# Patient Record
Sex: FEMALE | Race: BLACK OR AFRICAN AMERICAN | NOT HISPANIC OR LATINO | URBAN - METROPOLITAN AREA
[De-identification: names, ages, dates, MRNs, and addresses within clinical notes are randomized per-mention and may not be internally consistent; named-entity substitution may affect disease eponyms.]

---

## 2017-03-25 ENCOUNTER — OUTPATIENT (OUTPATIENT)
Dept: OUTPATIENT SERVICES | Facility: HOSPITAL | Age: 27
LOS: 1 days | End: 2017-03-25
Payer: MEDICAID

## 2017-03-25 DIAGNOSIS — Z3A.00 WEEKS OF GESTATION OF PREGNANCY NOT SPECIFIED: ICD-10-CM

## 2017-03-25 DIAGNOSIS — O26.899 OTHER SPECIFIED PREGNANCY RELATED CONDITIONS, UNSPECIFIED TRIMESTER: ICD-10-CM

## 2017-03-25 LAB
APPEARANCE UR: CLEAR — SIGNIFICANT CHANGE UP
BILIRUB UR-MCNC: NEGATIVE — SIGNIFICANT CHANGE UP
BLOOD UR QL VISUAL: NEGATIVE — SIGNIFICANT CHANGE UP
COLOR SPEC: YELLOW — SIGNIFICANT CHANGE UP
GLUCOSE UR-MCNC: NEGATIVE — SIGNIFICANT CHANGE UP
KETONES UR-MCNC: NEGATIVE — SIGNIFICANT CHANGE UP
LEUKOCYTE ESTERASE UR-ACNC: NEGATIVE — SIGNIFICANT CHANGE UP
MUCOUS THREADS # UR AUTO: SIGNIFICANT CHANGE UP
NITRITE UR-MCNC: NEGATIVE — SIGNIFICANT CHANGE UP
PH UR: 7 — SIGNIFICANT CHANGE UP (ref 4.6–8)
PROT UR-MCNC: 30 — HIGH
RBC CASTS # UR COMP ASSIST: SIGNIFICANT CHANGE UP (ref 0–?)
SP GR SPEC: 1.03 — SIGNIFICANT CHANGE UP (ref 1–1.03)
SQUAMOUS # UR AUTO: SIGNIFICANT CHANGE UP
UROBILINOGEN FLD QL: 1 E.U. — SIGNIFICANT CHANGE UP (ref 0.1–0.2)
WBC UR QL: SIGNIFICANT CHANGE UP (ref 0–?)

## 2017-03-25 PROCEDURE — 76818 FETAL BIOPHYS PROFILE W/NST: CPT | Mod: 26

## 2017-03-25 PROCEDURE — 99203 OFFICE O/P NEW LOW 30 MIN: CPT | Mod: 25

## 2017-03-27 LAB
BACTERIA UR CULT: SIGNIFICANT CHANGE UP
SPECIMEN SOURCE: SIGNIFICANT CHANGE UP

## 2017-04-10 ENCOUNTER — INPATIENT (INPATIENT)
Facility: HOSPITAL | Age: 27
LOS: 4 days | Discharge: HOME | End: 2017-04-15
Attending: OBSTETRICS & GYNECOLOGY | Admitting: OBSTETRICS & GYNECOLOGY
Payer: MEDICAID

## 2017-04-10 VITALS
DIASTOLIC BLOOD PRESSURE: 71 MMHG | HEIGHT: 66 IN | OXYGEN SATURATION: 99 % | HEART RATE: 92 BPM | RESPIRATION RATE: 18 BRPM | SYSTOLIC BLOOD PRESSURE: 130 MMHG | TEMPERATURE: 100 F | WEIGHT: 293 LBS

## 2017-04-10 DIAGNOSIS — O26.899 OTHER SPECIFIED PREGNANCY RELATED CONDITIONS, UNSPECIFIED TRIMESTER: ICD-10-CM

## 2017-04-10 DIAGNOSIS — Z3A.00 WEEKS OF GESTATION OF PREGNANCY NOT SPECIFIED: ICD-10-CM

## 2017-04-10 LAB
ALBUMIN SERPL ELPH-MCNC: 3.4 G/DL — SIGNIFICANT CHANGE UP (ref 3.3–5)
ALP SERPL-CCNC: 179 U/L — HIGH (ref 40–120)
ALT FLD-CCNC: 41 U/L — HIGH (ref 4–33)
APPEARANCE UR: CLEAR — SIGNIFICANT CHANGE UP
APTT BLD: 28.7 SEC — SIGNIFICANT CHANGE UP (ref 27.5–37.4)
AST SERPL-CCNC: 28 U/L — SIGNIFICANT CHANGE UP (ref 4–32)
BASOPHILS # BLD AUTO: 0.01 K/UL — SIGNIFICANT CHANGE UP (ref 0–0.2)
BASOPHILS NFR BLD AUTO: 0.1 % — SIGNIFICANT CHANGE UP (ref 0–2)
BILIRUB SERPL-MCNC: 0.4 MG/DL — SIGNIFICANT CHANGE UP (ref 0.2–1.2)
BILIRUB UR-MCNC: NEGATIVE — SIGNIFICANT CHANGE UP
BLD GP AB SCN SERPL QL: NEGATIVE — SIGNIFICANT CHANGE UP
BLOOD UR QL VISUAL: HIGH
BUN SERPL-MCNC: 8 MG/DL — SIGNIFICANT CHANGE UP (ref 7–23)
CALCIUM SERPL-MCNC: 9.1 MG/DL — SIGNIFICANT CHANGE UP (ref 8.4–10.5)
CHLORIDE SERPL-SCNC: 103 MMOL/L — SIGNIFICANT CHANGE UP (ref 98–107)
CO2 SERPL-SCNC: 20 MMOL/L — LOW (ref 22–31)
COLOR SPEC: YELLOW — SIGNIFICANT CHANGE UP
CREAT ?TM UR-MCNC: 162.01 MG/DL — SIGNIFICANT CHANGE UP
CREAT SERPL-MCNC: 0.43 MG/DL — LOW (ref 0.5–1.3)
EOSINOPHIL # BLD AUTO: 0.03 K/UL — SIGNIFICANT CHANGE UP (ref 0–0.5)
EOSINOPHIL NFR BLD AUTO: 0.3 % — SIGNIFICANT CHANGE UP (ref 0–6)
FIBRINOGEN PPP-MCNC: 835 MG/DL — HIGH (ref 310–510)
GLUCOSE SERPL-MCNC: 72 MG/DL — SIGNIFICANT CHANGE UP (ref 70–99)
GLUCOSE UR-MCNC: NEGATIVE — SIGNIFICANT CHANGE UP
HCT VFR BLD CALC: 33.4 % — LOW (ref 34.5–45)
HGB BLD-MCNC: 10.6 G/DL — LOW (ref 11.5–15.5)
IMM GRANULOCYTES NFR BLD AUTO: 0.1 % — SIGNIFICANT CHANGE UP (ref 0–1.5)
INR BLD: 0.98 — SIGNIFICANT CHANGE UP (ref 0.88–1.17)
KETONES UR-MCNC: SIGNIFICANT CHANGE UP
LEUKOCYTE ESTERASE UR-ACNC: SIGNIFICANT CHANGE UP
LYMPHOCYTES # BLD AUTO: 2.3 K/UL — SIGNIFICANT CHANGE UP (ref 1–3.3)
LYMPHOCYTES # BLD AUTO: 23.8 % — SIGNIFICANT CHANGE UP (ref 13–44)
MCHC RBC-ENTMCNC: 24.8 PG — LOW (ref 27–34)
MCHC RBC-ENTMCNC: 31.7 % — LOW (ref 32–36)
MCV RBC AUTO: 78 FL — LOW (ref 80–100)
MONOCYTES # BLD AUTO: 0.6 K/UL — SIGNIFICANT CHANGE UP (ref 0–0.9)
MONOCYTES NFR BLD AUTO: 6.2 % — SIGNIFICANT CHANGE UP (ref 2–14)
MUCOUS THREADS # UR AUTO: SIGNIFICANT CHANGE UP
NEUTROPHILS # BLD AUTO: 6.71 K/UL — SIGNIFICANT CHANGE UP (ref 1.8–7.4)
NEUTROPHILS NFR BLD AUTO: 69.5 % — SIGNIFICANT CHANGE UP (ref 43–77)
NITRITE UR-MCNC: NEGATIVE — SIGNIFICANT CHANGE UP
NON-SQ EPI CELLS # UR AUTO: <1 — SIGNIFICANT CHANGE UP
PH UR: 6.5 — SIGNIFICANT CHANGE UP (ref 4.6–8)
PLATELET # BLD AUTO: 254 K/UL — SIGNIFICANT CHANGE UP (ref 150–400)
PMV BLD: 11.2 FL — SIGNIFICANT CHANGE UP (ref 7–13)
POTASSIUM SERPL-MCNC: 3.8 MMOL/L — SIGNIFICANT CHANGE UP (ref 3.5–5.3)
POTASSIUM SERPL-SCNC: 3.8 MMOL/L — SIGNIFICANT CHANGE UP (ref 3.5–5.3)
PROT SERPL-MCNC: 7.8 G/DL — SIGNIFICANT CHANGE UP (ref 6–8.3)
PROT UR-MCNC: 30 — HIGH
PROT UR-MCNC: 46.2 MG/DL — SIGNIFICANT CHANGE UP
PROTHROM AB SERPL-ACNC: 11 SEC — SIGNIFICANT CHANGE UP (ref 9.8–13.1)
RBC # BLD: 4.28 M/UL — SIGNIFICANT CHANGE UP (ref 3.8–5.2)
RBC # FLD: 15 % — HIGH (ref 10.3–14.5)
RBC CASTS # UR COMP ASSIST: HIGH (ref 0–?)
RH IG SCN BLD-IMP: POSITIVE — SIGNIFICANT CHANGE UP
RH IG SCN BLD-IMP: POSITIVE — SIGNIFICANT CHANGE UP
SODIUM SERPL-SCNC: 139 MMOL/L — SIGNIFICANT CHANGE UP (ref 135–145)
SP GR SPEC: 1.03 — SIGNIFICANT CHANGE UP (ref 1–1.03)
SQUAMOUS # UR AUTO: SIGNIFICANT CHANGE UP
UROBILINOGEN FLD QL: 1 E.U. — SIGNIFICANT CHANGE UP (ref 0.1–0.2)
WBC # BLD: 9.66 K/UL — SIGNIFICANT CHANGE UP (ref 3.8–10.5)
WBC # FLD AUTO: 9.66 K/UL — SIGNIFICANT CHANGE UP (ref 3.8–10.5)
WBC UR QL: SIGNIFICANT CHANGE UP (ref 0–?)

## 2017-04-10 RX ORDER — CITRIC ACID/SODIUM CITRATE 300-500 MG
15 SOLUTION, ORAL ORAL EVERY 4 HOURS
Qty: 0 | Refills: 0 | Status: DISCONTINUED | OUTPATIENT
Start: 2017-04-10 | End: 2017-04-11

## 2017-04-10 RX ORDER — SODIUM CHLORIDE 9 MG/ML
1000 INJECTION, SOLUTION INTRAVENOUS
Qty: 0 | Refills: 0 | Status: DISCONTINUED | OUTPATIENT
Start: 2017-04-10 | End: 2017-04-10

## 2017-04-10 RX ORDER — PENICILLIN G POTASSIUM 5000000 [IU]/1
POWDER, FOR SOLUTION INTRAMUSCULAR; INTRAPLEURAL; INTRATHECAL; INTRAVENOUS
Qty: 0 | Refills: 0 | Status: DISCONTINUED | OUTPATIENT
Start: 2017-04-10 | End: 2017-04-10

## 2017-04-10 RX ORDER — SODIUM CHLORIDE 9 MG/ML
1000 INJECTION, SOLUTION INTRAVENOUS
Qty: 0 | Refills: 0 | Status: DISCONTINUED | OUTPATIENT
Start: 2017-04-10 | End: 2017-04-11

## 2017-04-10 RX ORDER — PENICILLIN G POTASSIUM 5000000 [IU]/1
2.5 POWDER, FOR SOLUTION INTRAMUSCULAR; INTRAPLEURAL; INTRATHECAL; INTRAVENOUS EVERY 4 HOURS
Qty: 0 | Refills: 0 | Status: DISCONTINUED | OUTPATIENT
Start: 2017-04-10 | End: 2017-04-10

## 2017-04-10 RX ORDER — SODIUM CHLORIDE 9 MG/ML
1000 INJECTION, SOLUTION INTRAVENOUS ONCE
Qty: 0 | Refills: 0 | Status: DISCONTINUED | OUTPATIENT
Start: 2017-04-10 | End: 2017-04-10

## 2017-04-10 RX ORDER — OXYTOCIN 10 UNIT/ML
333.33 VIAL (ML) INJECTION
Qty: 20 | Refills: 0 | Status: DISCONTINUED | OUTPATIENT
Start: 2017-04-10 | End: 2017-04-10

## 2017-04-10 RX ORDER — BUTORPHANOL TARTRATE 2 MG/ML
2 INJECTION, SOLUTION INTRAMUSCULAR; INTRAVENOUS ONCE
Qty: 0 | Refills: 0 | Status: DISCONTINUED | OUTPATIENT
Start: 2017-04-10 | End: 2017-04-10

## 2017-04-10 RX ORDER — PENICILLIN G POTASSIUM 5000000 [IU]/1
5 POWDER, FOR SOLUTION INTRAMUSCULAR; INTRAPLEURAL; INTRATHECAL; INTRAVENOUS ONCE
Qty: 0 | Refills: 0 | Status: COMPLETED | OUTPATIENT
Start: 2017-04-10 | End: 2017-04-10

## 2017-04-10 RX ORDER — OXYTOCIN 10 UNIT/ML
333.33 VIAL (ML) INJECTION
Qty: 20 | Refills: 0 | Status: DISCONTINUED | OUTPATIENT
Start: 2017-04-10 | End: 2017-04-11

## 2017-04-10 RX ORDER — SODIUM CHLORIDE 9 MG/ML
1000 INJECTION, SOLUTION INTRAVENOUS ONCE
Qty: 0 | Refills: 0 | Status: DISCONTINUED | OUTPATIENT
Start: 2017-04-10 | End: 2017-04-11

## 2017-04-10 RX ORDER — CITRIC ACID/SODIUM CITRATE 300-500 MG
15 SOLUTION, ORAL ORAL EVERY 4 HOURS
Qty: 0 | Refills: 0 | Status: DISCONTINUED | OUTPATIENT
Start: 2017-04-10 | End: 2017-04-10

## 2017-04-10 RX ADMIN — PENICILLIN G POTASSIUM 200 MILLION UNIT(S): 5000000 POWDER, FOR SOLUTION INTRAMUSCULAR; INTRAPLEURAL; INTRATHECAL; INTRAVENOUS at 23:00

## 2017-04-10 RX ADMIN — BUTORPHANOL TARTRATE 2 MILLIGRAM(S): 2 INJECTION, SOLUTION INTRAMUSCULAR; INTRAVENOUS at 22:42

## 2017-04-10 RX ADMIN — BUTORPHANOL TARTRATE 2 MILLIGRAM(S): 2 INJECTION, SOLUTION INTRAMUSCULAR; INTRAVENOUS at 23:01

## 2017-04-10 RX ADMIN — SODIUM CHLORIDE 125 MILLILITER(S): 9 INJECTION, SOLUTION INTRAVENOUS at 18:59

## 2017-04-10 RX ADMIN — Medication 204 MILLIGRAM(S): at 22:42

## 2017-04-10 RX ADMIN — PENICILLIN G POTASSIUM 200 MILLION UNIT(S): 5000000 POWDER, FOR SOLUTION INTRAMUSCULAR; INTRAPLEURAL; INTRATHECAL; INTRAVENOUS at 19:00

## 2017-04-11 ENCOUNTER — TRANSCRIPTION ENCOUNTER (OUTPATIENT)
Age: 27
End: 2017-04-11

## 2017-04-11 LAB
ALBUMIN SERPL ELPH-MCNC: 3.2 G/DL — LOW (ref 3.3–5)
ALP SERPL-CCNC: 175 U/L — HIGH (ref 40–120)
ALT FLD-CCNC: 38 U/L — HIGH (ref 4–33)
APTT BLD: 27.8 SEC — SIGNIFICANT CHANGE UP (ref 27.5–37.4)
AST SERPL-CCNC: 26 U/L — SIGNIFICANT CHANGE UP (ref 4–32)
BASOPHILS # BLD AUTO: 0.01 K/UL — SIGNIFICANT CHANGE UP (ref 0–0.2)
BASOPHILS NFR BLD AUTO: 0.1 % — SIGNIFICANT CHANGE UP (ref 0–2)
BILIRUB SERPL-MCNC: 0.4 MG/DL — SIGNIFICANT CHANGE UP (ref 0.2–1.2)
BUN SERPL-MCNC: 7 MG/DL — SIGNIFICANT CHANGE UP (ref 7–23)
CALCIUM SERPL-MCNC: 9.1 MG/DL — SIGNIFICANT CHANGE UP (ref 8.4–10.5)
CHLORIDE SERPL-SCNC: 100 MMOL/L — SIGNIFICANT CHANGE UP (ref 98–107)
CO2 SERPL-SCNC: 20 MMOL/L — LOW (ref 22–31)
CREAT SERPL-MCNC: 0.45 MG/DL — LOW (ref 0.5–1.3)
EOSINOPHIL # BLD AUTO: 0.03 K/UL — SIGNIFICANT CHANGE UP (ref 0–0.5)
EOSINOPHIL NFR BLD AUTO: 0.3 % — SIGNIFICANT CHANGE UP (ref 0–6)
FIBRINOGEN PPP-MCNC: 828.7 MG/DL — HIGH (ref 310–510)
GLUCOSE SERPL-MCNC: 123 MG/DL — HIGH (ref 70–99)
HCT VFR BLD CALC: 27.7 % — LOW (ref 34.5–45)
HCT VFR BLD CALC: 32.9 % — LOW (ref 34.5–45)
HCV AB S/CO SERPL IA: 0.19 S/CO — SIGNIFICANT CHANGE UP
HCV AB SERPL-IMP: SIGNIFICANT CHANGE UP
HGB BLD-MCNC: 10.4 G/DL — LOW (ref 11.5–15.5)
HGB BLD-MCNC: 8.8 G/DL — LOW (ref 11.5–15.5)
IMM GRANULOCYTES NFR BLD AUTO: 0.3 % — SIGNIFICANT CHANGE UP (ref 0–1.5)
INR BLD: 1.01 — SIGNIFICANT CHANGE UP (ref 0.88–1.17)
LDH SERPL L TO P-CCNC: 146 U/L — SIGNIFICANT CHANGE UP (ref 135–225)
LYMPHOCYTES # BLD AUTO: 2.03 K/UL — SIGNIFICANT CHANGE UP (ref 1–3.3)
LYMPHOCYTES # BLD AUTO: 22.4 % — SIGNIFICANT CHANGE UP (ref 13–44)
MCHC RBC-ENTMCNC: 24.6 PG — LOW (ref 27–34)
MCHC RBC-ENTMCNC: 25 PG — LOW (ref 27–34)
MCHC RBC-ENTMCNC: 31.6 % — LOW (ref 32–36)
MCHC RBC-ENTMCNC: 31.8 % — LOW (ref 32–36)
MCV RBC AUTO: 77.8 FL — LOW (ref 80–100)
MCV RBC AUTO: 78.7 FL — LOW (ref 80–100)
MONOCYTES # BLD AUTO: 0.54 K/UL — SIGNIFICANT CHANGE UP (ref 0–0.9)
MONOCYTES NFR BLD AUTO: 6 % — SIGNIFICANT CHANGE UP (ref 2–14)
NEUTROPHILS # BLD AUTO: 6.42 K/UL — SIGNIFICANT CHANGE UP (ref 1.8–7.4)
NEUTROPHILS NFR BLD AUTO: 70.9 % — SIGNIFICANT CHANGE UP (ref 43–77)
PLATELET # BLD AUTO: 224 K/UL — SIGNIFICANT CHANGE UP (ref 150–400)
PLATELET # BLD AUTO: 233 K/UL — SIGNIFICANT CHANGE UP (ref 150–400)
PMV BLD: 10.2 FL — SIGNIFICANT CHANGE UP (ref 7–13)
PMV BLD: 10.9 FL — SIGNIFICANT CHANGE UP (ref 7–13)
POTASSIUM SERPL-MCNC: 3.9 MMOL/L — SIGNIFICANT CHANGE UP (ref 3.5–5.3)
POTASSIUM SERPL-SCNC: 3.9 MMOL/L — SIGNIFICANT CHANGE UP (ref 3.5–5.3)
PROT SERPL-MCNC: 7.3 G/DL — SIGNIFICANT CHANGE UP (ref 6–8.3)
PROTHROM AB SERPL-ACNC: 11.3 SEC — SIGNIFICANT CHANGE UP (ref 9.8–13.1)
RBC # BLD: 3.52 M/UL — LOW (ref 3.8–5.2)
RBC # BLD: 4.23 M/UL — SIGNIFICANT CHANGE UP (ref 3.8–5.2)
RBC # FLD: 15.2 % — HIGH (ref 10.3–14.5)
RBC # FLD: 15.2 % — HIGH (ref 10.3–14.5)
SODIUM SERPL-SCNC: 139 MMOL/L — SIGNIFICANT CHANGE UP (ref 135–145)
T PALLIDUM AB TITR SER: NEGATIVE — SIGNIFICANT CHANGE UP
URATE SERPL-MCNC: 4.6 MG/DL — SIGNIFICANT CHANGE UP (ref 2.5–7)
WBC # BLD: 10.2 K/UL — SIGNIFICANT CHANGE UP (ref 3.8–10.5)
WBC # BLD: 9.06 K/UL — SIGNIFICANT CHANGE UP (ref 3.8–10.5)
WBC # FLD AUTO: 10.2 K/UL — SIGNIFICANT CHANGE UP (ref 3.8–10.5)
WBC # FLD AUTO: 9.06 K/UL — SIGNIFICANT CHANGE UP (ref 3.8–10.5)

## 2017-04-11 RX ORDER — GLYCERIN ADULT
1 SUPPOSITORY, RECTAL RECTAL AT BEDTIME
Qty: 0 | Refills: 0 | Status: DISCONTINUED | OUTPATIENT
Start: 2017-04-11 | End: 2017-04-15

## 2017-04-11 RX ORDER — HEPARIN SODIUM 5000 [USP'U]/ML
5000 INJECTION INTRAVENOUS; SUBCUTANEOUS EVERY 12 HOURS
Qty: 0 | Refills: 0 | Status: DISCONTINUED | OUTPATIENT
Start: 2017-04-11 | End: 2017-04-15

## 2017-04-11 RX ORDER — TETANUS TOXOID, REDUCED DIPHTHERIA TOXOID AND ACELLULAR PERTUSSIS VACCINE, ADSORBED 5; 2.5; 8; 8; 2.5 [IU]/.5ML; [IU]/.5ML; UG/.5ML; UG/.5ML; UG/.5ML
0.5 SUSPENSION INTRAMUSCULAR ONCE
Qty: 0 | Refills: 0 | Status: COMPLETED | OUTPATIENT
Start: 2017-04-11

## 2017-04-11 RX ORDER — DIPHENHYDRAMINE HCL 50 MG
25 CAPSULE ORAL EVERY 6 HOURS
Qty: 0 | Refills: 0 | Status: DISCONTINUED | OUTPATIENT
Start: 2017-04-11 | End: 2017-04-15

## 2017-04-11 RX ORDER — FERROUS SULFATE 325(65) MG
325 TABLET ORAL DAILY
Qty: 0 | Refills: 0 | Status: DISCONTINUED | OUTPATIENT
Start: 2017-04-11 | End: 2017-04-12

## 2017-04-11 RX ORDER — LANOLIN
1 OINTMENT (GRAM) TOPICAL
Qty: 0 | Refills: 0 | Status: DISCONTINUED | OUTPATIENT
Start: 2017-04-11 | End: 2017-04-15

## 2017-04-11 RX ORDER — PENICILLIN G POTASSIUM 5000000 [IU]/1
2.5 POWDER, FOR SOLUTION INTRAMUSCULAR; INTRAPLEURAL; INTRATHECAL; INTRAVENOUS EVERY 4 HOURS
Qty: 0 | Refills: 0 | Status: DISCONTINUED | OUTPATIENT
Start: 2017-04-11 | End: 2017-04-11

## 2017-04-11 RX ORDER — SODIUM CHLORIDE 9 MG/ML
1000 INJECTION, SOLUTION INTRAVENOUS
Qty: 0 | Refills: 0 | Status: DISCONTINUED | OUTPATIENT
Start: 2017-04-11 | End: 2017-04-11

## 2017-04-11 RX ORDER — SODIUM CHLORIDE 9 MG/ML
1000 INJECTION, SOLUTION INTRAVENOUS ONCE
Qty: 0 | Refills: 0 | Status: COMPLETED | OUTPATIENT
Start: 2017-04-11 | End: 2017-04-11

## 2017-04-11 RX ORDER — KETOROLAC TROMETHAMINE 30 MG/ML
30 SYRINGE (ML) INJECTION EVERY 6 HOURS
Qty: 0 | Refills: 0 | Status: DISCONTINUED | OUTPATIENT
Start: 2017-04-11 | End: 2017-04-12

## 2017-04-11 RX ORDER — PENICILLIN G POTASSIUM 5000000 [IU]/1
2.5 POWDER, FOR SOLUTION INTRAMUSCULAR; INTRAPLEURAL; INTRATHECAL; INTRAVENOUS ONCE
Qty: 0 | Refills: 0 | Status: COMPLETED | OUTPATIENT
Start: 2017-04-11 | End: 2017-04-11

## 2017-04-11 RX ORDER — OXYCODONE HYDROCHLORIDE 5 MG/1
5 TABLET ORAL
Qty: 0 | Refills: 0 | Status: DISCONTINUED | OUTPATIENT
Start: 2017-04-11 | End: 2017-04-15

## 2017-04-11 RX ORDER — IBUPROFEN 200 MG
600 TABLET ORAL EVERY 6 HOURS
Qty: 0 | Refills: 0 | Status: DISCONTINUED | OUTPATIENT
Start: 2017-04-11 | End: 2017-04-15

## 2017-04-11 RX ORDER — OXYTOCIN 10 UNIT/ML
333.33 VIAL (ML) INJECTION
Qty: 20 | Refills: 0 | Status: DISCONTINUED | OUTPATIENT
Start: 2017-04-11 | End: 2017-04-11

## 2017-04-11 RX ORDER — SODIUM CHLORIDE 9 MG/ML
1000 INJECTION, SOLUTION INTRAVENOUS
Qty: 0 | Refills: 0 | Status: DISCONTINUED | OUTPATIENT
Start: 2017-04-11 | End: 2017-04-12

## 2017-04-11 RX ORDER — OXYTOCIN 10 UNIT/ML
41.67 VIAL (ML) INJECTION
Qty: 20 | Refills: 0 | Status: DISCONTINUED | OUTPATIENT
Start: 2017-04-11 | End: 2017-04-11

## 2017-04-11 RX ORDER — DOCUSATE SODIUM 100 MG
100 CAPSULE ORAL
Qty: 0 | Refills: 0 | Status: DISCONTINUED | OUTPATIENT
Start: 2017-04-11 | End: 2017-04-12

## 2017-04-11 RX ORDER — SIMETHICONE 80 MG/1
80 TABLET, CHEWABLE ORAL EVERY 4 HOURS
Qty: 0 | Refills: 0 | Status: DISCONTINUED | OUTPATIENT
Start: 2017-04-11 | End: 2017-04-15

## 2017-04-11 RX ORDER — PENICILLIN G POTASSIUM 5000000 [IU]/1
POWDER, FOR SOLUTION INTRAMUSCULAR; INTRAPLEURAL; INTRATHECAL; INTRAVENOUS
Qty: 0 | Refills: 0 | Status: DISCONTINUED | OUTPATIENT
Start: 2017-04-11 | End: 2017-04-11

## 2017-04-11 RX ORDER — ACETAMINOPHEN 500 MG
975 TABLET ORAL EVERY 6 HOURS
Qty: 0 | Refills: 0 | Status: DISCONTINUED | OUTPATIENT
Start: 2017-04-11 | End: 2017-04-15

## 2017-04-11 RX ADMIN — Medication 30 MILLIGRAM(S): at 08:00

## 2017-04-11 RX ADMIN — Medication 975 MILLIGRAM(S): at 14:00

## 2017-04-11 RX ADMIN — Medication 30 MILLIGRAM(S): at 14:02

## 2017-04-11 RX ADMIN — Medication 125 MILLIUNIT(S)/MIN: at 06:42

## 2017-04-11 RX ADMIN — HEPARIN SODIUM 5000 UNIT(S): 5000 INJECTION INTRAVENOUS; SUBCUTANEOUS at 18:26

## 2017-04-11 RX ADMIN — Medication 30 MILLIGRAM(S): at 15:00

## 2017-04-11 RX ADMIN — PENICILLIN G POTASSIUM 200 MILLION UNIT(S): 5000000 POWDER, FOR SOLUTION INTRAMUSCULAR; INTRAPLEURAL; INTRATHECAL; INTRAVENOUS at 03:07

## 2017-04-11 RX ADMIN — SODIUM CHLORIDE 2000 MILLILITER(S): 9 INJECTION, SOLUTION INTRAVENOUS at 06:20

## 2017-04-11 RX ADMIN — Medication 975 MILLIGRAM(S): at 14:02

## 2017-04-12 RX ORDER — FERROUS SULFATE 325(65) MG
325 TABLET ORAL
Qty: 0 | Refills: 0 | Status: DISCONTINUED | OUTPATIENT
Start: 2017-04-12 | End: 2017-04-15

## 2017-04-12 RX ORDER — ASCORBIC ACID 60 MG
500 TABLET,CHEWABLE ORAL DAILY
Qty: 0 | Refills: 0 | Status: DISCONTINUED | OUTPATIENT
Start: 2017-04-12 | End: 2017-04-15

## 2017-04-12 RX ORDER — DOCUSATE SODIUM 100 MG
100 CAPSULE ORAL
Qty: 0 | Refills: 0 | Status: DISCONTINUED | OUTPATIENT
Start: 2017-04-12 | End: 2017-04-15

## 2017-04-12 RX ADMIN — OXYCODONE HYDROCHLORIDE 5 MILLIGRAM(S): 5 TABLET ORAL at 06:20

## 2017-04-12 RX ADMIN — Medication 100 MILLIGRAM(S): at 06:29

## 2017-04-12 RX ADMIN — HEPARIN SODIUM 5000 UNIT(S): 5000 INJECTION INTRAVENOUS; SUBCUTANEOUS at 06:29

## 2017-04-12 RX ADMIN — Medication 975 MILLIGRAM(S): at 12:30

## 2017-04-12 RX ADMIN — Medication 975 MILLIGRAM(S): at 12:40

## 2017-04-12 RX ADMIN — Medication 600 MILLIGRAM(S): at 20:00

## 2017-04-12 RX ADMIN — Medication 975 MILLIGRAM(S): at 18:48

## 2017-04-12 RX ADMIN — Medication 600 MILLIGRAM(S): at 12:38

## 2017-04-12 RX ADMIN — Medication 600 MILLIGRAM(S): at 12:31

## 2017-04-12 RX ADMIN — HEPARIN SODIUM 5000 UNIT(S): 5000 INJECTION INTRAVENOUS; SUBCUTANEOUS at 18:49

## 2017-04-12 RX ADMIN — Medication 600 MILLIGRAM(S): at 18:49

## 2017-04-12 RX ADMIN — OXYCODONE HYDROCHLORIDE 5 MILLIGRAM(S): 5 TABLET ORAL at 07:00

## 2017-04-12 RX ADMIN — Medication 975 MILLIGRAM(S): at 20:00

## 2017-04-13 ENCOUNTER — TRANSCRIPTION ENCOUNTER (OUTPATIENT)
Age: 27
End: 2017-04-13

## 2017-04-13 LAB
ALBUMIN SERPL ELPH-MCNC: 3.1 G/DL — LOW (ref 3.3–5)
ALP SERPL-CCNC: 138 U/L — HIGH (ref 40–120)
ALT FLD-CCNC: 32 U/L — SIGNIFICANT CHANGE UP (ref 4–33)
APTT BLD: 28.1 SEC — SIGNIFICANT CHANGE UP (ref 27.5–37.4)
AST SERPL-CCNC: 28 U/L — SIGNIFICANT CHANGE UP (ref 4–32)
BASOPHILS # BLD AUTO: 0.01 K/UL — SIGNIFICANT CHANGE UP (ref 0–0.2)
BASOPHILS NFR BLD AUTO: 0.1 % — SIGNIFICANT CHANGE UP (ref 0–2)
BILIRUB SERPL-MCNC: 0.3 MG/DL — SIGNIFICANT CHANGE UP (ref 0.2–1.2)
BUN SERPL-MCNC: 8 MG/DL — SIGNIFICANT CHANGE UP (ref 7–23)
CALCIUM SERPL-MCNC: 8.9 MG/DL — SIGNIFICANT CHANGE UP (ref 8.4–10.5)
CHLORIDE SERPL-SCNC: 103 MMOL/L — SIGNIFICANT CHANGE UP (ref 98–107)
CO2 SERPL-SCNC: 22 MMOL/L — SIGNIFICANT CHANGE UP (ref 22–31)
CREAT SERPL-MCNC: 0.46 MG/DL — LOW (ref 0.5–1.3)
EOSINOPHIL # BLD AUTO: 0.06 K/UL — SIGNIFICANT CHANGE UP (ref 0–0.5)
EOSINOPHIL NFR BLD AUTO: 0.5 % — SIGNIFICANT CHANGE UP (ref 0–6)
FIBRINOGEN PPP-MCNC: 959 MG/DL — HIGH (ref 310–510)
GLUCOSE SERPL-MCNC: 102 MG/DL — HIGH (ref 70–99)
HCT VFR BLD CALC: 29.2 % — LOW (ref 34.5–45)
HGB BLD-MCNC: 9.1 G/DL — LOW (ref 11.5–15.5)
IMM GRANULOCYTES NFR BLD AUTO: 0.5 % — SIGNIFICANT CHANGE UP (ref 0–1.5)
INR BLD: 0.96 — SIGNIFICANT CHANGE UP (ref 0.88–1.17)
LDH SERPL L TO P-CCNC: 194 U/L — SIGNIFICANT CHANGE UP (ref 135–225)
LYMPHOCYTES # BLD AUTO: 2.42 K/UL — SIGNIFICANT CHANGE UP (ref 1–3.3)
LYMPHOCYTES # BLD AUTO: 20.3 % — SIGNIFICANT CHANGE UP (ref 13–44)
MCHC RBC-ENTMCNC: 24.6 PG — LOW (ref 27–34)
MCHC RBC-ENTMCNC: 31.2 % — LOW (ref 32–36)
MCV RBC AUTO: 78.9 FL — LOW (ref 80–100)
MONOCYTES # BLD AUTO: 0.56 K/UL — SIGNIFICANT CHANGE UP (ref 0–0.9)
MONOCYTES NFR BLD AUTO: 4.7 % — SIGNIFICANT CHANGE UP (ref 2–14)
NEUTROPHILS # BLD AUTO: 8.81 K/UL — HIGH (ref 1.8–7.4)
NEUTROPHILS NFR BLD AUTO: 73.9 % — SIGNIFICANT CHANGE UP (ref 43–77)
PLATELET # BLD AUTO: 261 K/UL — SIGNIFICANT CHANGE UP (ref 150–400)
PMV BLD: 10.1 FL — SIGNIFICANT CHANGE UP (ref 7–13)
POTASSIUM SERPL-MCNC: 3.8 MMOL/L — SIGNIFICANT CHANGE UP (ref 3.5–5.3)
POTASSIUM SERPL-SCNC: 3.8 MMOL/L — SIGNIFICANT CHANGE UP (ref 3.5–5.3)
PROT SERPL-MCNC: 7.1 G/DL — SIGNIFICANT CHANGE UP (ref 6–8.3)
PROTHROM AB SERPL-ACNC: 10.8 SEC — SIGNIFICANT CHANGE UP (ref 9.8–13.1)
RBC # BLD: 3.7 M/UL — LOW (ref 3.8–5.2)
RBC # FLD: 15.9 % — HIGH (ref 10.3–14.5)
SODIUM SERPL-SCNC: 144 MMOL/L — SIGNIFICANT CHANGE UP (ref 135–145)
URATE SERPL-MCNC: 6.3 MG/DL — SIGNIFICANT CHANGE UP (ref 2.5–7)
WBC # BLD: 11.92 K/UL — HIGH (ref 3.8–10.5)
WBC # FLD AUTO: 11.92 K/UL — HIGH (ref 3.8–10.5)

## 2017-04-13 PROCEDURE — 93010 ELECTROCARDIOGRAM REPORT: CPT

## 2017-04-13 RX ORDER — ACETAMINOPHEN 500 MG
2 TABLET ORAL
Qty: 0 | Refills: 0 | COMMUNITY
Start: 2017-04-13

## 2017-04-13 RX ORDER — IBUPROFEN 200 MG
1 TABLET ORAL
Qty: 0 | Refills: 0 | COMMUNITY
Start: 2017-04-13

## 2017-04-13 RX ORDER — FAMOTIDINE 10 MG/ML
20 INJECTION INTRAVENOUS ONCE
Qty: 0 | Refills: 0 | Status: COMPLETED | OUTPATIENT
Start: 2017-04-13 | End: 2017-04-13

## 2017-04-13 RX ORDER — LABETALOL HCL 100 MG
200 TABLET ORAL
Qty: 0 | Refills: 0 | Status: DISCONTINUED | OUTPATIENT
Start: 2017-04-13 | End: 2017-04-14

## 2017-04-13 RX ORDER — TETANUS TOXOID, REDUCED DIPHTHERIA TOXOID AND ACELLULAR PERTUSSIS VACCINE, ADSORBED 5; 2.5; 8; 8; 2.5 [IU]/.5ML; [IU]/.5ML; UG/.5ML; UG/.5ML; UG/.5ML
0.5 SUSPENSION INTRAMUSCULAR ONCE
Qty: 0 | Refills: 0 | Status: COMPLETED | OUTPATIENT
Start: 2017-04-13 | End: 2017-04-13

## 2017-04-13 RX ADMIN — SIMETHICONE 80 MILLIGRAM(S): 80 TABLET, CHEWABLE ORAL at 17:28

## 2017-04-13 RX ADMIN — FAMOTIDINE 20 MILLIGRAM(S): 10 INJECTION INTRAVENOUS at 17:28

## 2017-04-13 RX ADMIN — Medication 600 MILLIGRAM(S): at 02:05

## 2017-04-13 RX ADMIN — HEPARIN SODIUM 5000 UNIT(S): 5000 INJECTION INTRAVENOUS; SUBCUTANEOUS at 17:09

## 2017-04-13 RX ADMIN — Medication 200 MILLIGRAM(S): at 20:06

## 2017-04-13 RX ADMIN — HEPARIN SODIUM 5000 UNIT(S): 5000 INJECTION INTRAVENOUS; SUBCUTANEOUS at 06:31

## 2017-04-13 RX ADMIN — Medication 600 MILLIGRAM(S): at 03:00

## 2017-04-13 RX ADMIN — TETANUS TOXOID, REDUCED DIPHTHERIA TOXOID AND ACELLULAR PERTUSSIS VACCINE, ADSORBED 0.5 MILLILITER(S): 5; 2.5; 8; 8; 2.5 SUSPENSION INTRAMUSCULAR at 06:15

## 2017-04-13 NOTE — PROVIDER CONTACT NOTE (OTHER) - RECOMMENDATIONS
Lactated ringers bolus
NP notified and aware of pt status and of improved BP after labetalol administration.

## 2017-04-13 NOTE — DISCHARGE NOTE OB - PLAN OF CARE
good recovery f/u 1 week Schedule an appointment with Dr. Ortega on  to follow up for your blood pressure and

## 2017-04-13 NOTE — DISCHARGE NOTE OB - CARE PROVIDER_API CALL
Mitzy Ortega (MIGDALIA), Obstetrics and Gynecology  [address] [place] [address]  Phone: [phone]  Fax: [phone]

## 2017-04-13 NOTE — PROVIDER CONTACT NOTE (OTHER) - BACKGROUND
post c section
Primary c/s from 4/11 at 514am. 1-0-1-1. [id number]. Had elevated pressures, MD Ortega aware, PIH negative.
Pt is s/p C/S on 4/11/17 at 05:14. Pt with labile BP's during day shift, PMD made aware, no action ordered at that time. Pt c/o chest heaviness at 1730, CBC WNL, EKG done - sinus tachycardia.

## 2017-04-13 NOTE — DISCHARGE NOTE OB - CARE PLAN
Principal Discharge DX:	 delivery delivered  Goal:	good recovery  Instructions for follow-up, activity and diet:	f/u 1 week Principal Discharge DX:	 delivery delivered  Goal:	good recovery  Instructions for follow-up, activity and diet:	Schedule an appointment with Dr. Ortega on  to follow up for your blood pressure and

## 2017-04-13 NOTE — PROVIDER CONTACT NOTE (OTHER) - ACTION/TREATMENT ORDERED:
1 Liter Lactated ringers bolus
Will come up to assess patient.
No further action ordered at this time. Will continue to monitor.

## 2017-04-13 NOTE — PROVIDER CONTACT NOTE (OTHER) - ASSESSMENT
patient blood pressure 76/47
Fundus is firm, bleeding WNL. Lungs CTA. +Bowel sounds. Pt denies shortness of breath, dizziness. Passing flatus.
Pt denies c/o HA, blurry vision, or epigastric pain. Labetalol 200mg BID ordered at 2000, dose given at 20:07.

## 2017-04-13 NOTE — DISCHARGE NOTE OB - MATERIALS PROVIDED
Discharge Medication Information for Patients and Families Pocket Guide/Vaccinations/Breastfeeding Log/Guide to Postpartum Care/[facility] Screening Program/  Immunization Record

## 2017-04-13 NOTE — DISCHARGE NOTE OB - PATIENT PORTAL LINK FT
“You can access the Follow[facility] Patient Portal, offered by [facility], by registering with the following website: [email or web address]”

## 2017-04-14 RX ORDER — LABETALOL HCL 100 MG
1 TABLET ORAL
Qty: 60 | Refills: 0 | OUTPATIENT
Start: 2017-04-14 | End: 2017-05-14

## 2017-04-14 RX ORDER — LABETALOL HCL 100 MG
200 TABLET ORAL EVERY 8 HOURS
Qty: 0 | Refills: 0 | Status: DISCONTINUED | OUTPATIENT
Start: 2017-04-14 | End: 2017-04-15

## 2017-04-14 RX ADMIN — Medication 500 MILLIGRAM(S): at 11:54

## 2017-04-14 RX ADMIN — Medication 1 TABLET(S): at 11:54

## 2017-04-14 RX ADMIN — Medication 600 MILLIGRAM(S): at 08:15

## 2017-04-14 RX ADMIN — Medication 325 MILLIGRAM(S): at 08:15

## 2017-04-14 RX ADMIN — Medication 325 MILLIGRAM(S): at 18:13

## 2017-04-14 RX ADMIN — Medication 325 MILLIGRAM(S): at 11:54

## 2017-04-14 RX ADMIN — HEPARIN SODIUM 5000 UNIT(S): 5000 INJECTION INTRAVENOUS; SUBCUTANEOUS at 18:15

## 2017-04-14 RX ADMIN — HEPARIN SODIUM 5000 UNIT(S): 5000 INJECTION INTRAVENOUS; SUBCUTANEOUS at 06:51

## 2017-04-14 RX ADMIN — Medication 600 MILLIGRAM(S): at 18:14

## 2017-04-14 RX ADMIN — Medication 200 MILLIGRAM(S): at 08:15

## 2017-04-14 RX ADMIN — Medication 200 MILLIGRAM(S): at 18:14

## 2017-04-14 RX ADMIN — Medication 600 MILLIGRAM(S): at 18:45

## 2017-04-14 RX ADMIN — Medication 600 MILLIGRAM(S): at 08:45

## 2017-04-15 VITALS
RESPIRATION RATE: 18 BRPM | HEART RATE: 89 BPM | DIASTOLIC BLOOD PRESSURE: 81 MMHG | OXYGEN SATURATION: 100 % | TEMPERATURE: 98 F | SYSTOLIC BLOOD PRESSURE: 151 MMHG

## 2017-04-15 RX ORDER — LABETALOL HCL 100 MG
1 TABLET ORAL
Qty: 90 | Refills: 0 | OUTPATIENT
Start: 2017-04-15 | End: 2017-05-15

## 2017-04-15 RX ADMIN — Medication 200 MILLIGRAM(S): at 02:19

## 2017-04-15 RX ADMIN — Medication 325 MILLIGRAM(S): at 10:07

## 2017-04-15 RX ADMIN — Medication 200 MILLIGRAM(S): at 10:07

## 2017-04-15 RX ADMIN — HEPARIN SODIUM 5000 UNIT(S): 5000 INJECTION INTRAVENOUS; SUBCUTANEOUS at 06:31

## 2017-05-18 ENCOUNTER — TRANSCRIPTION ENCOUNTER (OUTPATIENT)
Age: 27
End: 2017-05-18

## 2017-05-18 ENCOUNTER — INPATIENT (INPATIENT)
Facility: HOSPITAL | Age: 27
LOS: 9 days | Discharge: HOME | End: 2017-05-28
Attending: INTERNAL MEDICINE | Admitting: INTERNAL MEDICINE
Payer: MEDICAID

## 2017-05-18 VITALS
WEIGHT: 250 LBS | RESPIRATION RATE: 19 BRPM | DIASTOLIC BLOOD PRESSURE: 91 MMHG | SYSTOLIC BLOOD PRESSURE: 117 MMHG | OXYGEN SATURATION: 100 % | HEART RATE: 120 BPM

## 2017-05-18 DIAGNOSIS — I16.1 HYPERTENSIVE EMERGENCY: ICD-10-CM

## 2017-05-18 DIAGNOSIS — Z98.891 HISTORY OF UTERINE SCAR FROM PREVIOUS SURGERY: ICD-10-CM

## 2017-05-18 DIAGNOSIS — R06.02 SHORTNESS OF BREATH: ICD-10-CM

## 2017-05-18 DIAGNOSIS — Z29.9 ENCOUNTER FOR PROPHYLACTIC MEASURES, UNSPECIFIED: ICD-10-CM

## 2017-05-18 DIAGNOSIS — R07.9 CHEST PAIN, UNSPECIFIED: ICD-10-CM

## 2017-05-18 LAB
ALBUMIN SERPL ELPH-MCNC: 4.4 G/DL — SIGNIFICANT CHANGE UP (ref 3.3–5)
ALP SERPL-CCNC: 95 U/L — SIGNIFICANT CHANGE UP (ref 40–120)
ALT FLD-CCNC: 19 U/L — SIGNIFICANT CHANGE UP (ref 4–33)
APPEARANCE UR: CLEAR — SIGNIFICANT CHANGE UP
AST SERPL-CCNC: 21 U/L — SIGNIFICANT CHANGE UP (ref 4–32)
BASOPHILS # BLD AUTO: 0.02 K/UL — SIGNIFICANT CHANGE UP (ref 0–0.2)
BASOPHILS NFR BLD AUTO: 0.2 % — SIGNIFICANT CHANGE UP (ref 0–2)
BILIRUB SERPL-MCNC: 0.4 MG/DL — SIGNIFICANT CHANGE UP (ref 0.2–1.2)
BILIRUB UR-MCNC: NEGATIVE — SIGNIFICANT CHANGE UP
BLOOD UR QL VISUAL: NEGATIVE — SIGNIFICANT CHANGE UP
BUN SERPL-MCNC: 15 MG/DL — SIGNIFICANT CHANGE UP (ref 7–23)
CALCIUM SERPL-MCNC: 9.8 MG/DL — SIGNIFICANT CHANGE UP (ref 8.4–10.5)
CHLORIDE SERPL-SCNC: 105 MMOL/L — SIGNIFICANT CHANGE UP (ref 98–107)
CK SERPL-CCNC: 117 U/L — SIGNIFICANT CHANGE UP (ref 25–170)
CO2 SERPL-SCNC: 22 MMOL/L — SIGNIFICANT CHANGE UP (ref 22–31)
COLOR SPEC: SIGNIFICANT CHANGE UP
CREAT SERPL-MCNC: 0.96 MG/DL — SIGNIFICANT CHANGE UP (ref 0.5–1.3)
EOSINOPHIL # BLD AUTO: 0.07 K/UL — SIGNIFICANT CHANGE UP (ref 0–0.5)
EOSINOPHIL NFR BLD AUTO: 0.8 % — SIGNIFICANT CHANGE UP (ref 0–6)
GLUCOSE SERPL-MCNC: 77 MG/DL — SIGNIFICANT CHANGE UP (ref 70–99)
GLUCOSE UR-MCNC: NEGATIVE — SIGNIFICANT CHANGE UP
HCT VFR BLD CALC: 36.3 % — SIGNIFICANT CHANGE UP (ref 34.5–45)
HGB BLD-MCNC: 10.9 G/DL — LOW (ref 11.5–15.5)
IMM GRANULOCYTES NFR BLD AUTO: 0.1 % — SIGNIFICANT CHANGE UP (ref 0–1.5)
KETONES UR-MCNC: NEGATIVE — SIGNIFICANT CHANGE UP
LEUKOCYTE ESTERASE UR-ACNC: NEGATIVE — SIGNIFICANT CHANGE UP
LYMPHOCYTES # BLD AUTO: 2.79 K/UL — SIGNIFICANT CHANGE UP (ref 1–3.3)
LYMPHOCYTES # BLD AUTO: 32.6 % — SIGNIFICANT CHANGE UP (ref 13–44)
MAGNESIUM SERPL-MCNC: 2 MG/DL — SIGNIFICANT CHANGE UP (ref 1.6–2.6)
MCHC RBC-ENTMCNC: 23.6 PG — LOW (ref 27–34)
MCHC RBC-ENTMCNC: 30 % — LOW (ref 32–36)
MCV RBC AUTO: 78.7 FL — LOW (ref 80–100)
MONOCYTES # BLD AUTO: 0.32 K/UL — SIGNIFICANT CHANGE UP (ref 0–0.9)
MONOCYTES NFR BLD AUTO: 3.7 % — SIGNIFICANT CHANGE UP (ref 2–14)
MUCOUS THREADS # UR AUTO: SIGNIFICANT CHANGE UP
NEUTROPHILS # BLD AUTO: 5.36 K/UL — SIGNIFICANT CHANGE UP (ref 1.8–7.4)
NEUTROPHILS NFR BLD AUTO: 62.6 % — SIGNIFICANT CHANGE UP (ref 43–77)
NITRITE UR-MCNC: NEGATIVE — SIGNIFICANT CHANGE UP
NT-PROBNP SERPL-SCNC: 5728 PG/ML — SIGNIFICANT CHANGE UP
PH UR: 6 — SIGNIFICANT CHANGE UP (ref 4.6–8)
PHOSPHATE SERPL-MCNC: 3.3 MG/DL — SIGNIFICANT CHANGE UP (ref 2.5–4.5)
PLATELET # BLD AUTO: 387 K/UL — SIGNIFICANT CHANGE UP (ref 150–400)
PMV BLD: 10.6 FL — SIGNIFICANT CHANGE UP (ref 7–13)
POTASSIUM SERPL-MCNC: 3.9 MMOL/L — SIGNIFICANT CHANGE UP (ref 3.5–5.3)
POTASSIUM SERPL-SCNC: 3.9 MMOL/L — SIGNIFICANT CHANGE UP (ref 3.5–5.3)
PROT SERPL-MCNC: 8.8 G/DL — HIGH (ref 6–8.3)
PROT UR-MCNC: 10 — SIGNIFICANT CHANGE UP
RBC # BLD: 4.61 M/UL — SIGNIFICANT CHANGE UP (ref 3.8–5.2)
RBC # FLD: 16.9 % — HIGH (ref 10.3–14.5)
RBC CASTS # UR COMP ASSIST: SIGNIFICANT CHANGE UP (ref 0–?)
SODIUM SERPL-SCNC: 145 MMOL/L — SIGNIFICANT CHANGE UP (ref 135–145)
SP GR SPEC: 1.01 — SIGNIFICANT CHANGE UP (ref 1–1.03)
SQUAMOUS # UR AUTO: SIGNIFICANT CHANGE UP
TROPONIN T SERPL-MCNC: < 0.06 NG/ML — SIGNIFICANT CHANGE UP (ref 0–0.06)
TSH SERPL-MCNC: 1.63 UIU/ML — SIGNIFICANT CHANGE UP (ref 0.27–4.2)
UROBILINOGEN FLD QL: NORMAL E.U. — SIGNIFICANT CHANGE UP (ref 0.1–0.2)
WBC # BLD: 8.57 K/UL — SIGNIFICANT CHANGE UP (ref 3.8–10.5)
WBC # FLD AUTO: 8.57 K/UL — SIGNIFICANT CHANGE UP (ref 3.8–10.5)
WBC UR QL: SIGNIFICANT CHANGE UP (ref 0–?)

## 2017-05-18 PROCEDURE — 71020: CPT | Mod: 26

## 2017-05-18 PROCEDURE — 93306 TTE W/DOPPLER COMPLETE: CPT | Mod: 26

## 2017-05-18 PROCEDURE — 93010 ELECTROCARDIOGRAM REPORT: CPT

## 2017-05-18 RX ORDER — FUROSEMIDE 40 MG
40 TABLET ORAL ONCE
Qty: 0 | Refills: 0 | Status: COMPLETED | OUTPATIENT
Start: 2017-05-18 | End: 2017-05-18

## 2017-05-18 RX ORDER — HEPARIN SODIUM 5000 [USP'U]/ML
5000 INJECTION INTRAVENOUS; SUBCUTANEOUS EVERY 8 HOURS
Qty: 0 | Refills: 0 | Status: DISCONTINUED | OUTPATIENT
Start: 2017-05-18 | End: 2017-05-23

## 2017-05-18 RX ORDER — POTASSIUM CHLORIDE 20 MEQ
20 PACKET (EA) ORAL ONCE
Qty: 0 | Refills: 0 | Status: COMPLETED | OUTPATIENT
Start: 2017-05-18 | End: 2017-05-18

## 2017-05-18 RX ORDER — LEVETIRACETAM 250 MG/1
500 TABLET, FILM COATED ORAL ONCE
Qty: 0 | Refills: 0 | Status: DISCONTINUED | OUTPATIENT
Start: 2017-05-18 | End: 2017-05-18

## 2017-05-18 RX ORDER — NITROGLYCERIN 6.5 MG
10 CAPSULE, EXTENDED RELEASE ORAL
Qty: 50 | Refills: 0 | Status: DISCONTINUED | OUTPATIENT
Start: 2017-05-18 | End: 2017-05-19

## 2017-05-18 RX ORDER — LABETALOL HCL 100 MG
200 TABLET ORAL ONCE
Qty: 0 | Refills: 0 | Status: COMPLETED | OUTPATIENT
Start: 2017-05-18 | End: 2017-05-18

## 2017-05-18 RX ORDER — HYDRALAZINE HCL 50 MG
5 TABLET ORAL ONCE
Qty: 0 | Refills: 0 | Status: COMPLETED | OUTPATIENT
Start: 2017-05-18 | End: 2017-05-18

## 2017-05-18 RX ORDER — LEVETIRACETAM 250 MG/1
500 TABLET, FILM COATED ORAL EVERY 12 HOURS
Qty: 0 | Refills: 0 | Status: COMPLETED | OUTPATIENT
Start: 2017-05-18 | End: 2017-05-19

## 2017-05-18 RX ADMIN — Medication 20 MILLIEQUIVALENT(S): at 19:55

## 2017-05-18 RX ADMIN — HEPARIN SODIUM 5000 UNIT(S): 5000 INJECTION INTRAVENOUS; SUBCUTANEOUS at 21:16

## 2017-05-18 RX ADMIN — Medication 200 MILLIGRAM(S): at 15:20

## 2017-05-18 RX ADMIN — Medication 40 MILLIGRAM(S): at 18:22

## 2017-05-18 RX ADMIN — Medication 3 MICROGRAM(S)/MIN: at 18:57

## 2017-05-18 RX ADMIN — Medication 3 MICROGRAM(S)/MIN: at 18:22

## 2017-05-18 RX ADMIN — LEVETIRACETAM 500 MILLIGRAM(S): 250 TABLET, FILM COATED ORAL at 18:55

## 2017-05-18 RX ADMIN — Medication 5 MILLIGRAM(S): at 15:48

## 2017-05-18 NOTE — ED ADULT NURSE NOTE - OBJECTIVE STATEMENT
pt is a 26 yr old female c/o SOB and palpations since yesterday. PP x 1 month, pt states having palpations since 2nd trimester or pregnacny. pt treated for pre-ecamplisia  with labatolol. pt denies HA, blurred vision, weakness, fever, chills. + SOB, GONZALEZ, chest pressure. BP elevated, HR ST on monitor, no acute distress noted otherwise. piv attempted, unable to achieve at this time, resident aware plan for us IV, xray pending.

## 2017-05-18 NOTE — CONSULT NOTE ADULT - ATTENDING COMMENTS
High suspicion for PPCM. Will check urgent echo, and if LV is dysfunctional, will admit to CCU for aggressive diuresis and BP control. Tachycardia suggests diminished cardiac output.

## 2017-05-18 NOTE — H&P ADULT - PROBLEM SELECTOR PLAN 1
--patient with history of post partum HTN on outpatient labetalol presenting with hypertensive emergency, max pressures 190s/120s with evidence of end organ damage by pulmonary edema on CXR, though also may be related to systolic dysfunction suggested on TTE  --renal function wnl, no signs or symptoms concerning for hypertensive encephalopathy/seizure  --s/p hydralazine 5mg IVP x 1 and labetalol 200mg PO x 1 in ER with improvement in BP  --will start nitro drip @ 10mcg with titration of -110, hold labetalol   --continue with Keppra for seizure prophylaxis

## 2017-05-18 NOTE — CONSULT NOTE ADULT - SUBJECTIVE AND OBJECTIVE BOX
25 yo  F w/PMHx PEC s/p emergent C/S at 37 and 5 p/w one week hx of progressive SOB. Pt reports that throughout her last trimester of pregnancy, she had SOB and palpitations. The SOB improved post-partum, but then returned a week ago. The palpitations continued throughout. She also has noticed a dry cough overnight, and GONZALEZ- cannot walk more than one block, leading her to seek medical attention in the ED today. Lastly, pt has noticed some swelling in her legs B/L. Denies orthopnea. Denies F/C, N/V, CP (pt does report some substernal pressure), abdominal pain, changes in BM, dysuria, dizziness, syncope. Denies recent weight gain.    Date of Admission: 17    Allergies    No Known Allergies      MEDICATIONS:  hydrALAZINE Injectable 5milliGRAM(s) IV Push Once      PAST MEDICAL & SURGICAL HISTORY:  pre-eclampsia      FAMILY HISTORY:  CHF (aunt)  HTN, T2DM (parents)    SOCIAL HISTORY:    [ X ] Non-smoker  No alcohol, recreational drug use      REVIEW OF SYSTEMS:  See HPI. Otherwise, 10 point ROS done and otherwise negative.    PHYSICAL EXAM:  T(C): --  HR: 115 (110 - 120)  BP: 183/125 (117/91 - 183/125)  RR: 20 (19 - 30)  SpO2: 98% (98% - 100%)  Wt(kg): --  I&O's Summary      Appearance: Overwt female	  HEENT:   Normal oral mucosa, PERRL, EOMI	  Lymphatic: No lymphadenopathy  Cardiovascular: Tachycardia, No JVD, No murmurs, No edema  Respiratory: Lungs clear to auscultation	  Psychiatry: A & O x 3, Mood & affect appropriate  Gastrointestinal:  Soft, mildly tender, + BS	  Skin: No rashes, No ecchymoses, No cyanosis	  Neurologic: Non-focal  Extremities: Normal range of motion, No clubbing, cyanosis or edema  Vascular: Peripheral pulses palpable 2+ bilaterally        LABS:	 	    CBC Full  -  ( 18 May 2017 14:00 )  WBC Count : 8.57 K/uL  Hemoglobin : 10.9 g/dL  Hematocrit : 36.3 %  Platelet Count - Automated : 387 K/uL  Mean Cell Volume : 78.7 fL  Mean Cell Hemoglobin : 23.6 pg  Mean Cell Hemoglobin Concentration : 30.0 %  Auto Neutrophil # : 5.36 K/uL  Auto Lymphocyte # : 2.79 K/uL  Auto Monocyte # : 0.32 K/uL  Auto Eosinophil # : 0.07 K/uL  Auto Basophil # : 0.02 K/uL  Auto Neutrophil % : 62.6 %  Auto Lymphocyte % : 32.6 %  Auto Monocyte % : 3.7 %  Auto Eosinophil % : 0.8 %  Auto Basophil % : 0.2 %        145  |  105  |  15  ----------------------------<  77  3.9   |  22  |  0.96    Ca    9.8      18 May 2017 12:46  Phos  3.3       Mg     2.0         TPro  8.8<H>  /  Alb  4.4  /  TBili  0.4  /  DBili  x   /  AST  21  /  ALT  19  /  AlkPhos  95        proBNP: Serum Pro-Brain Natriuretic Peptide: 5728 pg/mL ( @ 12:46)      TSH: Thyroid Stimulating Hormone, Serum: 1.63 uIU/mL ( @ 12:46)        TELEMETRY: 	    ECG:  	  RADIOLOGY:  Slightly prominent and indistinct bilateral lung markings, hazy hilar   shadows, and right peribronchial cuffing compatible with congestion and   edema.    Hazy indistinct right CP angle compatible with small right pleural   effusion. Bibasilar strand-like opacities compatible with subsegmental   atelectatic changes or possible infiltrates/pneumonia in the proper   clinical context, follow-up suggested as warranted.    No pneumothorax.     Enlarged appearing cardiac silhouette.    Trachea midline.    Unremarkable osseous structures.    OTHER: 	    PREVIOUS DIAGNOSTIC TESTING:    [ ] Echocardiogram: N/A  [ ] Catheterization:  N/A  [ ] Stress Test:  N/A  	  	  ASSESSMENT/PLAN: 	  25 yo  F w/PMHx PEC s/p emergent C/S at 37 and 5 p/w one week hx of progressive SOB, palpitations, and B/L leg swelling. Found to have enlarged cardiac silhouette and congestion on CXR, and elevated BNP, possibly 2/2 peripartum cardiomyopathy.    #SOB/edema/palpitations: possibly 2/2 peripartum cardiomyopathy  -f/u echo results  -if positive, will start on ACEI and diuretic  -hold BB for now until cardiac output further evaluated    #HTN emergency  -s/p 5mg hydralazine  -continue to monitor vitals - may administer hydralazine for elevated BP

## 2017-05-18 NOTE — ED CLERICAL - NS ED CLERK NOTE PRE-ARRIVAL INFORMATION; ADDITIONAL PRE-ARRIVAL INFORMATION
Palpitations,tachy, pale obese, R/O PE or symptomatic anemia. Pt is 1 mos post c section hx of pre eclampsia and anemia

## 2017-05-18 NOTE — ED PROVIDER NOTE - MEDICAL DECISION MAKING DETAILS
26yof 1mo postpartum w/ tachycardia, dyspnea and chest pain, new lateral TWI concerning for postpartum cardiomyopathy vs PE. Labs, CXR, CTA chest. Cardiology and OB consult.

## 2017-05-18 NOTE — ED PROVIDER NOTE - PROGRESS NOTE DETAILS
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 Admitted to CCU.

## 2017-05-18 NOTE — H&P ADULT - NSHPLABSRESULTS_GEN_ALL_CORE
10.9   8.57  )-----------( 387      ( 18 May 2017 14:00 )             36.3     05-18    145  |  105  |  15  ----------------------------<  77  3.9   |  22  |  0.96    Ca    9.8      18 May 2017 12:46  Phos  3.3     05-18  Mg     2.0     05-18    TPro  8.8<H>  /  Alb  4.4  /  TBili  0.4  /  DBili  x   /  AST  21  /  ALT  19  /  AlkPhos  95  05-18    CARDIAC MARKERS ( 18 May 2017 12:46 )  x     / < 0.06 ng/mL / 117 u/L / x     / x                 5728 pg/mL   05-18-17 @ 12:46

## 2017-05-18 NOTE — ED PROVIDER NOTE - ATTENDING CONTRIBUTION TO CARE
DR Bloch- Patient examined, Hx taken, htn after preeclampsia,sob, palpitations, edema resolving, onlabetolol,  well appearing NAD, No JVD, lungs decreased BS right base, HS tachycardic S1S2, possible S3, abd soft nontender, Ext 1+ edema bilaterally- concern for PE vs postpartum cardiomyopathy.

## 2017-05-18 NOTE — ED PROVIDER NOTE - OBJECTIVE STATEMENT
26yof w/ gestational HTN, 1 month postpartum s/p  for preeclampsia p/w persistent tachycardia, w/ progressive palpitations and shortness of breath. For the last week has been having dyspnea on exertion and her HR has been in the 110s. Had tachycardia throughout the pregnancy but it has not resolved. Endorses intermittent heaviness in the chest associated w/ exertion. No prior cardiac hx. No hx of DVT/PE.

## 2017-05-18 NOTE — ED ADULT NURSE NOTE - CCCP TRG CHIEF CMPLNT
shortness of breath/and palpitations , worse on exertion since yesterday. Asymptomatic at present. EKG in triage in progress

## 2017-05-18 NOTE — H&P ADULT - NSHPREVIEWOFSYSTEMS_GEN_ALL_CORE
CONSTITUTIONAL: No fever, weight loss, or fatigue  EYES: No eye pain, visual disturbances, or discharge  ENMT:  No difficulty hearing, tinnitus, vertigo; No sinus or throat pain  RESPIRATORY: Reports COUGH, SHORTNESS OF BREATH. No wheezing, chills or hemoptysis  CARDIOVASCULAR: Reports CHEST PAIN, PALPITATIONS, LEG SWELLING, ORTHOPNEA. Denies paroxysmal nocturnal dyspnea, dizziness  GASTROINTESTINAL: No abdominal or epigastric pain. No nausea, vomiting, or hematemesis; No diarrhea or constipation. No melena or hematochezia.  GENITOURINARY: No dysuria, frequency, hematuria, or incontinence  NEUROLOGICAL: No headaches, memory loss, loss of strength, numbness, or tremors  SKIN: No itching, burning, rashes, or lesions   LYMPH NODES: No enlarged glands  ENDOCRINE: No heat or cold intolerance; No hair loss  MUSCULOSKELETAL: No joint pain or swelling; No muscle, back, or extremity pain  PSYCHIATRIC: No depression, anxiety, mood swings, or difficulty sleeping  HEME/LYMPH: No easy bruising, or bleeding gums  ALLERY AND IMMUNOLOGIC: No hives or eczema

## 2017-05-18 NOTE — H&P ADULT - PROBLEM SELECTOR PLAN 4
--ob/gyn on board and following  --keppra prophylaxis as above as post-partum pre-eclampsia cannot be excluded in Ddx   --pending workup and diagnosis, will need to discuss with patient if she is breastfeeding as will be a consideration in pharmacotherapy choice

## 2017-05-18 NOTE — PROGRESS NOTE ADULT - SUBJECTIVE AND OBJECTIVE BOX
GYN Progress Note--ED visit  26  s/p pLTCS on  after induction for PEC presents with dyspnea on exertion/shortness of breath since yesterday. Pt reports feeling weak since delivery but not short of breath. She feels more short of breath when she lies down. She also complains of chest heaviness. Denies nausea, vomiting, fevers, chills. Denies headache, vision changes, right upper quadrant pain. She is not breastfeeding. She has been taking labetalol 200 TID since delivery and home BP monitoring has been 140s/100 per patient.   Upon arrival to examine patient, she was persistently hypertensive to 180s/120s. She was given a dose of PO labetalol 200 by the ED prior to GYN arrival as she missed her AM labetalol dose. She was given a 5mg hydralazine push with BP improvement to 156/97. She was started on keppra for seizure prophylaxis.   Vital Signs Last 24 Hrs  T(C): --  T(F): 9.5, Max: 9.5 (05-18 @ 11:57) (this is an error)   HR: 115 (110 - 120)  BP: 183/125 (117/91 - 183/125)  BP(mean): --  RR: 20 (19 - 30)  SpO2: 98% (98% - 100%)  On PE, tachycardia was noted, lungs were clear to ausculation, and she had a well-healed  scar. Her extremities did not have pitting edema.  On the differential is postpartum cardiomyopathy, pulmonary embolism, and postpartum preeclampsia.   Cardiology was at bedside evaluating patient and she was sent for ECHO. If negative, will workup patient for PE.    I saw and examined this patient with Dr. Jeff.  Niurka Boggs [id number]

## 2017-05-18 NOTE — H&P ADULT - PROBLEM SELECTOR PLAN 3
--unlikely ischemic given negative cardiac enzymes x 1, negative prior cardiac history and diagnostic evidence to suggest symptoms related either to hypertension and/or pulmonary edema   --Tylenol PRN for pain. If chest pain significantly worsens will obtain second set of cardiac enzymes and repeat 12-lead EKG

## 2017-05-18 NOTE — H&P ADULT - ASSESSMENT
27yo F  history of gestational HTN in recent pregnancy on labetalol outpatient, now one month post-partum from non-elective  for pre-eclampsia at 37w5d, presenting with chief complaint of chest pain and shortness of breath. 25yo F  history of gestational HTN in recent pregnancy on labetalol outpatient, now one month post-partum from non-elective  for pre-eclampsia at 37w5d, presenting with chief complaint of shortness of breath in the setting of pulmonary edema on chest X ray and elevated pro-BNP concerning for post-partum cardiomyopathy.

## 2017-05-18 NOTE — H&P ADULT - NSHPPHYSICALEXAM_GEN_ALL_CORE
Gen: alert, NAD, lying in bed comfortable appearing, non-toxic  HEENT: clear conjunctiva, sclera anicteric. MMM.  Pulm: decreased BS at RLB, grossly clear left lung field. No c/w/r  CV: tachycardic, no m/r/g. no JVD  GI: Soft, obese, ND, NTTP, well-healed LTCS scar c/d/i  Neuro: AOx3, non focal  Ext: no peripheral edema

## 2017-05-18 NOTE — H&P ADULT - PROBLEM SELECTOR PLAN 2
--primary differential diagnosis post-partum cardiomyopathy, ddx also includes PE vs ACS vs HTN  --presentation given orthopnea, pulmonary edema, enlarged cardiac silhouette and elevated pro-BNP > 5000, also with informal read of TTE showing LV systolic dysfunction, most concerning for post-partum cardiomyopathy  --given recent pregnancy and prothrombotic state, tachycardia and chest pain also will rule out PE  --despite chest pain and TWI on admission EKG, low suspicion for ischemic etiology given alternative diagnoses more likely, initial cardiac enzymes negative  --follow up definitive read of TTE. If no evidence of right heart strain no need for further evaluation of PE, however if evidence of RV strain or if TTE grossly normal will obtain CTA chest  --40mg IVP Lasix x 1 now   --BP control to reduce afterload with nitro gtt as above

## 2017-05-19 ENCOUNTER — TRANSCRIPTION ENCOUNTER (OUTPATIENT)
Age: 27
End: 2017-05-19

## 2017-05-19 DIAGNOSIS — I16.0 HYPERTENSIVE URGENCY: ICD-10-CM

## 2017-05-19 LAB
ALBUMIN SERPL ELPH-MCNC: 4.1 G/DL — SIGNIFICANT CHANGE UP (ref 3.3–5)
ALP SERPL-CCNC: 89 U/L — SIGNIFICANT CHANGE UP (ref 40–120)
ALT FLD-CCNC: 20 U/L — SIGNIFICANT CHANGE UP (ref 4–33)
APTT BLD: 34.3 SEC — SIGNIFICANT CHANGE UP (ref 27.5–37.4)
AST SERPL-CCNC: 24 U/L — SIGNIFICANT CHANGE UP (ref 4–32)
BILIRUB SERPL-MCNC: 0.9 MG/DL — SIGNIFICANT CHANGE UP (ref 0.2–1.2)
BUN SERPL-MCNC: 13 MG/DL — SIGNIFICANT CHANGE UP (ref 7–23)
CALCIUM SERPL-MCNC: 9.6 MG/DL — SIGNIFICANT CHANGE UP (ref 8.4–10.5)
CHLORIDE SERPL-SCNC: 103 MMOL/L — SIGNIFICANT CHANGE UP (ref 98–107)
CO2 SERPL-SCNC: 21 MMOL/L — LOW (ref 22–31)
CREAT SERPL-MCNC: 0.88 MG/DL — SIGNIFICANT CHANGE UP (ref 0.5–1.3)
GLUCOSE SERPL-MCNC: 88 MG/DL — SIGNIFICANT CHANGE UP (ref 70–99)
HBA1C BLD-MCNC: 5.9 % — HIGH (ref 4–5.6)
HCT VFR BLD CALC: 35.9 % — SIGNIFICANT CHANGE UP (ref 34.5–45)
HGB BLD-MCNC: 11.1 G/DL — LOW (ref 11.5–15.5)
INR BLD: 1.12 — SIGNIFICANT CHANGE UP (ref 0.88–1.17)
MAGNESIUM SERPL-MCNC: 2 MG/DL — SIGNIFICANT CHANGE UP (ref 1.6–2.6)
MCHC RBC-ENTMCNC: 24.2 PG — LOW (ref 27–34)
MCHC RBC-ENTMCNC: 30.9 % — LOW (ref 32–36)
MCV RBC AUTO: 78.2 FL — LOW (ref 80–100)
PHOSPHATE SERPL-MCNC: 3.6 MG/DL — SIGNIFICANT CHANGE UP (ref 2.5–4.5)
PLATELET # BLD AUTO: 376 K/UL — SIGNIFICANT CHANGE UP (ref 150–400)
PMV BLD: 10.4 FL — SIGNIFICANT CHANGE UP (ref 7–13)
POTASSIUM SERPL-MCNC: 3.8 MMOL/L — SIGNIFICANT CHANGE UP (ref 3.5–5.3)
POTASSIUM SERPL-SCNC: 3.8 MMOL/L — SIGNIFICANT CHANGE UP (ref 3.5–5.3)
PROT SERPL-MCNC: 8.4 G/DL — HIGH (ref 6–8.3)
PROTHROM AB SERPL-ACNC: 12.6 SEC — SIGNIFICANT CHANGE UP (ref 9.8–13.1)
RBC # BLD: 4.59 M/UL — SIGNIFICANT CHANGE UP (ref 3.8–5.2)
RBC # FLD: 16.9 % — HIGH (ref 10.3–14.5)
SODIUM SERPL-SCNC: 143 MMOL/L — SIGNIFICANT CHANGE UP (ref 135–145)
WBC # BLD: 8.1 K/UL — SIGNIFICANT CHANGE UP (ref 3.8–10.5)
WBC # FLD AUTO: 8.1 K/UL — SIGNIFICANT CHANGE UP (ref 3.8–10.5)

## 2017-05-19 PROCEDURE — 99233 SBSQ HOSP IP/OBS HIGH 50: CPT

## 2017-05-19 PROCEDURE — 93010 ELECTROCARDIOGRAM REPORT: CPT

## 2017-05-19 RX ORDER — POTASSIUM CHLORIDE 20 MEQ
20 PACKET (EA) ORAL DAILY
Qty: 0 | Refills: 0 | Status: DISCONTINUED | OUTPATIENT
Start: 2017-05-19 | End: 2017-05-19

## 2017-05-19 RX ORDER — POTASSIUM CHLORIDE 20 MEQ
20 PACKET (EA) ORAL DAILY
Qty: 0 | Refills: 0 | Status: DISCONTINUED | OUTPATIENT
Start: 2017-05-19 | End: 2017-05-21

## 2017-05-19 RX ORDER — POTASSIUM CHLORIDE 20 MEQ
20 PACKET (EA) ORAL DAILY
Qty: 0 | Refills: 0 | Status: COMPLETED | OUTPATIENT
Start: 2017-05-19 | End: 2017-05-19

## 2017-05-19 RX ORDER — LISINOPRIL 2.5 MG/1
2.5 TABLET ORAL DAILY
Qty: 0 | Refills: 0 | Status: DISCONTINUED | OUTPATIENT
Start: 2017-05-19 | End: 2017-05-20

## 2017-05-19 RX ORDER — FUROSEMIDE 40 MG
40 TABLET ORAL
Qty: 0 | Refills: 0 | Status: DISCONTINUED | OUTPATIENT
Start: 2017-05-19 | End: 2017-05-20

## 2017-05-19 RX ORDER — CHLORHEXIDINE GLUCONATE 213 G/1000ML
1 SOLUTION TOPICAL DAILY
Qty: 0 | Refills: 0 | Status: DISCONTINUED | OUTPATIENT
Start: 2017-05-19 | End: 2017-05-28

## 2017-05-19 RX ADMIN — LISINOPRIL 2.5 MILLIGRAM(S): 2.5 TABLET ORAL at 11:35

## 2017-05-19 RX ADMIN — LEVETIRACETAM 500 MILLIGRAM(S): 250 TABLET, FILM COATED ORAL at 06:10

## 2017-05-19 RX ADMIN — HEPARIN SODIUM 5000 UNIT(S): 5000 INJECTION INTRAVENOUS; SUBCUTANEOUS at 22:14

## 2017-05-19 RX ADMIN — HEPARIN SODIUM 5000 UNIT(S): 5000 INJECTION INTRAVENOUS; SUBCUTANEOUS at 14:08

## 2017-05-19 RX ADMIN — Medication 20 MILLIEQUIVALENT(S): at 12:30

## 2017-05-19 RX ADMIN — HEPARIN SODIUM 5000 UNIT(S): 5000 INJECTION INTRAVENOUS; SUBCUTANEOUS at 06:10

## 2017-05-19 RX ADMIN — Medication 40 MILLIGRAM(S): at 11:35

## 2017-05-19 RX ADMIN — Medication 40 MILLIGRAM(S): at 20:21

## 2017-05-19 NOTE — PROGRESS NOTE ADULT - PROBLEM SELECTOR PLAN 1
--patient with history of post partum HTN on outpatient labetalol presenting with hypertensive emergency, max pressures 190s/120s with evidence of end organ damage by pulmonary edema on CXR, though also may be related to systolic dysfunction suggested on TTE  --renal function wnl, no signs or symptoms concerning for hypertensive encephalopathy/seizure  --s/p hydralazine 5mg IVP x 1 and labetalol 200mg PO x 1 in ER with improvement in BP  --will start nitro drip @ 10mcg with titration of -110, hold labetalol   --continue with Keppra for seizure prophylaxis --patient p/w symptoms concerning for CHF with dyspnea on exertion, orthopnea and palpitations in the setting of recently delivery for pre-eclampsia, a statistically significant risk factor for PPCM  --evidence on TTE of severe global systolic LV dysfunction with EF 24%, also mild RV systolic dysfunction concerning for right-sided HF 2/2 progressive left-sided HF. Severe pulmonary HTN likely class II 2/2 CHF.  --unlikely ischemic CM given presenting EKG and negative CE on admission  --symptoms and pulmonary exam improved s/p Lasix diuresis and afterload reduction via BP control with nitro gtt  --pt will require treatment as for CHF with BB, ACEI/ARB, also aldactone given degree of systolic dysfunction. Patient is not breastfeeding, does not need to be considered in pharmacotherapy   --start Lopressor 25mg BID now as patient remains tachycardic, no longer on nitro gtt, and outpatient Labetalol not appropriate for CHF. Will clinically assess hemodynamics and if tolerating, will plan to add Lisinopril and Aldactone later  --prognosis unclear, patient will need close cardiologist follow up after discharge to continually asses cardiac function. Diagnosis post-partum carries more favorable diagnosis but  race with presenting EF < 30% suggest pt may never fully recover function  --obgyn on board, will need to discuss risks with patient going forward as her and her  are intending on a subsequent pregnancy in the future --patient p/w symptoms concerning for CHF with dyspnea on exertion, orthopnea and palpitations in the setting of recently delivery for pre-eclampsia, a statistically significant risk factor for PPCM  --evidence on TTE of severe global systolic LV dysfunction with EF 24%, also mild RV systolic dysfunction concerning for right-sided HF 2/2 progressive left-sided HF. Severe pulmonary HTN likely class II 2/2 CHF.  --unlikely ischemic CM given presenting EKG and negative CE on admission  --symptoms and pulmonary exam improved s/p Lasix diuresis and afterload reduction via BP control with nitro gtt  --pt will require treatment as for CHF with BB, ACEI/ARB, also aldactone given degree of systolic dysfunction. Patient is not breastfeeding, does not need to be considered in pharmacotherapy   --start Coreg 3.125 mg BID now as patient remains tachycardic, no longer on nitro gtt, and outpatient Labetalol not appropriate for CHF. Will also start Lisinopril 2.5mg qd.  Will uptitrate both as tolerated for maximum benefit.  --prognosis unclear, patient will need close cardiologist follow up after discharge to continually asses cardiac function. Diagnosis post-partum carries more favorable diagnosis but  race with presenting EF < 30% suggest pt may never fully recover function  --obgyn on board, will need to discuss risks with patient going forward as her and her  are intending on a subsequent pregnancy in the future --patient p/w symptoms concerning for CHF with dyspnea on exertion, orthopnea and palpitations in the setting of recently delivery for pre-eclampsia, a statistically significant risk factor for PPCM  --evidence on TTE of severe global systolic LV dysfunction with EF 24%, also mild RV systolic dysfunction concerning for right-sided HF 2/2 progressive left-sided HF. Severe pulmonary HTN likely class II 2/2 CHF.  --unlikely ischemic CM given presenting EKG and negative CE on admission  --symptoms and pulmonary exam improved s/p Lasix diuresis and afterload reduction via BP control with nitro gtt  --pt will require treatment as for CHF with BB, ACEI/ARB, also aldactone given degree of systolic dysfunction. Patient is not breastfeeding, does not need to be considered in pharmacotherapy   --start Lisinopril 2.5mg qd now, will hold off on beta blockade so as to not exacerbate CO.  If hemodynamically tolerates, will add Coreg 3.125mg BID. Will plan to uptitrate both as tolerated for maximum benefit.  --prognosis unclear, patient will need close cardiologist follow up after discharge to continually asses cardiac function. Diagnosis post-partum carries more favorable diagnosis but  race with presenting EF < 30% suggest pt may never fully recover function  --obgyn on board, will need to discuss risks with patient going forward as her and her  are intending on a subsequent pregnancy in the future --patient p/w symptoms concerning for CHF with dyspnea on exertion, orthopnea and palpitations in the setting of recently delivery for pre-eclampsia, a statistically significant risk factor for PPCM  --evidence on TTE of severe global systolic LV dysfunction with EF 24%, also mild RV systolic dysfunction concerning for right-sided HF 2/2 progressive left-sided HF. Severe pulmonary HTN likely class II 2/2 CHF.  --unlikely ischemic CM given presenting EKG and negative CE on admission  --symptoms and pulmonary exam improved s/p Lasix diuresis and afterload reduction via BP control with nitro gtt  --pt will require treatment as for CHF with BB, ACEI/ARB, also aldactone given degree of systolic dysfunction. Patient is not breastfeeding, does not need to be considered in pharmacotherapy   --start Lisinopril 2.5mg qd now, will hold off on beta blockade so as to not exacerbate CO.  If hemodynamically tolerates, will add Coreg 3.125mg BID. Will plan to uptitrate both as tolerated for maximum benefit.  --start Lasix 40mg PO BID  --prognosis unclear, patient will need close cardiologist follow up after discharge to continually asses cardiac function. Diagnosis post-partum carries more favorable diagnosis but  race with presenting EF < 30% suggest pt may never fully recover function  --obgyn on board, will need to discuss risks with patient going forward as her and her  are intending on a subsequent pregnancy in the future

## 2017-05-19 NOTE — DISCHARGE NOTE ADULT - HOSPITAL COURSE
HPI:  27yo F  history of gestational HTN in recent pregnancy on labetalol outpatient, now one month post-partum from non-elective  for pre-eclampsia at 37w5d, presenting with chief complaint of chest pain and shortness of breath x 1 day. Per patient, for the past day or two she has been experiencing dyspnea on exertion also associated with palpitations and now chest tightness worse on exertion. Of note patient reports having had a fast heart rate throughout her pregnancy with sensation of palpitations and also shortness of breath, particularly in her last trimester. The palpitations have continued since delivery without any periods of remission, however she feels as though her SOB improved initially post-partum but has since recurred over the last day or two. Only recently, she developed a dry cough and progression of SOB on exertion, also bilateral lower extremity edema. She reports orthopnea but denies PND. She also denies any other cardiac history and history of DVT/ PE. Denies calf tenderness. She has been taking labetalol 200 TID since delivery and home BP monitoring has been 140s/100 per patient.     In the ER, patient noted to be tachycardic to the 120s, also hypertensive to max 180s/120s, also tachypneic to the 30s, afebrile. Admission chest X ray showed evidence of pulmonary edema, also small right-sided pleural effusion and enlarged cardiac sillhouette. EKG notable for TWI in lateral leads. CBC and CMP unremarkable. First set of cardiac enzymes negative. Pro-BNP elevated at 5700. Patient given hydralazine 5mg IVP, also 200mg PO Labetalol x 1. Additionally, patient started on Keppra for seizure prophylaxis. Taken to echo lab for TTE, per unofficial read evidence of severe systolic LV dysfunction and tricuspid regurgitation, final read pending.    Hospital Course:  Patient was admitted to the CCU from echo suite and started on nitroglycerin infusion for acute management of blood pressure with goal mean arterial pressures of 100-110. She was also given a single dose of Lasix 40mg IV push. Cardiac enzymes were not trended as her chest pain was believed unlikely to be ischemic given her negative cardiac history, age and lack of comorbidities paired with non-ischemic admission EKG and initial negative set of cardiac enyzmes. Nitroglycerin infusion was ultimately discontinued that evening, as mean arterial pressures averaged in the 60s. With improved blood pressures and greater than two liters of urine output with intravenous Lasix, patient did not report any further chest pain and her dyspnea, while persistent, improved. She, however, remained tachycardic with rate in the low 100s.     Transthoracic echocardiogram returned concerning findings of severe global systolic left ventricular dysfunction with ejection fraction of 24%, also mild right ventricular systolic dysfunction, mild-moderate mitral regurgitation and severe pulmonary hypertension. 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 The right ventricular systolic dysfunction was believed in this setting to be the result of progressive left-sided heart failure. Similarly, her severe pulmonary hypertension was thought most likely class II in setting of heart failure rather than suggestive of alternative etiology such as pulmonary embolus. She was started on Lisinopril 2.5mg daily initially; beta blockers were held for a time owing to concerns that lowering her heart rate may exacerbate her cardiac output. She was also started on maintenance diuresis with 40mg oral Lasix twice daily. Per discussion with ob/gyn, Keppra was discontinued as the etiology of her symptoms was unlikely to be post-partum pre-eclampsia. HPI:  25yo F  history of gestational HTN in recent pregnancy on labetalol outpatient, now one month post-partum from non-elective  for pre-eclampsia at 37w5d, presenting with chief complaint of chest pain and shortness of breath x 1 day. Per patient, for the past day or two she has been experiencing dyspnea on exertion also associated with palpitations and now chest tightness worse on exertion. Of note patient reports having had a fast heart rate throughout her pregnancy with sensation of palpitations and also shortness of breath, particularly in her last trimester. The palpitations have continued since delivery without any periods of remission, however she feels as though her SOB improved initially post-partum but has since recurred over the last day or two. Only recently, she developed a dry cough and progression of SOB on exertion, also bilateral lower extremity edema. She reports orthopnea but denies PND. She also denies any other cardiac history and history of DVT/ PE. Denies calf tenderness. She has been taking labetalol 200 TID since delivery and home BP monitoring has been 140s/100 per patient.     In the ER, patient noted to be tachycardic to the 120s, also hypertensive to max 180s/120s, also tachypneic to the 30s, afebrile. Admission chest X ray showed evidence of pulmonary edema, also small right-sided pleural effusion and enlarged cardiac sillhouette. EKG notable for TWI in lateral leads. CBC and CMP unremarkable. First set of cardiac enzymes negative. Pro-BNP elevated at 5700. Patient given hydralazine 5mg IVP, also 200mg PO Labetalol x 1. Additionally, patient started on Keppra for seizure prophylaxis. Taken to echo lab for TTE, per unofficial read evidence of severe systolic LV dysfunction and tricuspid regurgitation, final read pending.    Hospital Course:  Patient was admitted to the CCU from echo suite and started on nitroglycerin infusion for acute management of blood pressure with goal mean arterial pressures of 100-110. She was also given a single dose of Lasix 40mg IV push. Cardiac enzymes were not trended as her chest pain was believed unlikely to be ischemic given her negative cardiac history, age and lack of comorbidities paired with non-ischemic admission EKG and initial negative set of cardiac enyzmes. Nitroglycerin infusion was ultimately discontinued that evening, as mean arterial pressures averaged in the 60s. With improved blood pressures and greater than two liters of urine output with intravenous Lasix, patient did not report any further chest pain and her dyspnea, while persistent, improved. She, however, remained tachycardic with rate in the low 100s.     Transthoracic echocardiogram returned concerning findings of severe global systolic left ventricular dysfunction with ejection fraction of 24%, also mild right ventricular systolic dysfunction, mild-moderate mitral regurgitation and severe pulmonary hypertension. 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 The right ventricular systolic dysfunction was believed in this setting to be the result of progressive left-sided heart failure. Similarly, her severe pulmonary hypertension was thought most likely class II in setting of heart failure rather than suggestive of alternative etiology such as pulmonary embolus. She was started on Lisinopril 2.5mg daily initially; beta blockers were held for a time owing to concerns that lowering her heart rate may exacerbate her cardiac output. She was also started on maintenance diuresis with 40mg oral Lasix twice daily. Per discussion with ob/gyn, Keppra was discontinued as the etiology of her symptoms was unlikely to be post-partum pre-eclampsia.       : diuresed > 2L s/p 40mg IVP Lasix with symptomatic improvement but still with positional SOB. Remains tachycadic to [id number]. Started Lisinopril 2.5mg qd, holding off on BB for now as do not want to lower CO. Also started Lasix 40mg PO BID with 20meq K supplement. D/c'd Keppra.   Overnight:  on minimal exertion.  Lisinopril increased to 5mg, started on lopressor 25 BID (for possible tachy-induced cardiomyopathy), then changed to Coreg for better impact on cardiac remodeling. Also started on lasix.  HIV, HCV, SPEP sent for evaluation of elevated protein gap.  Restarted prenatal vitamins.    : Tachycardic still but improved, rate 100s-110. Volume overloaded as evidenced by JVD, changed lasix from PO to 40 IV BID, discontinued coreg because concern patient appears improved but is worsening failure. Added aldactone 12.5mg qd and d/c'd potassium supplement. dietry consulted  overnight: still tachy 120s on exertion  : R heart cath today, eval for fluid status, c/w current plan, heart failure c/s;d placed. Heart failure team said start coreg 6.25 bid, captopril 12.5 tid, digoxin load, continue diuretics. Would defer right heart cath.  : Captopril decreased. BPs soft overnight. Order CXR to assess volume status. Ordered TTE to assess EF and Pa pressures. d/c;d spironolactone. 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 Increased Coreg, Possible Ivabradine and/or bromocriptine.   : HR improved, PTT supratherapeutic, held. after speaking with pharmacy, pt can receive bromocriptine, pt was explained risks and benefits and appears to be OK with it. will f/u heart failure. HPI:  25yo F  history of gestational HTN in recent pregnancy on labetalol outpatient, now one month post-partum from non-elective  for pre-eclampsia at 37w5d, presenting with chief complaint of chest pain and shortness of breath x 1 day. Per patient, for the past day or two she has been experiencing dyspnea on exertion also associated with palpitations and now chest tightness worse on exertion. Of note patient reports having had a fast heart rate throughout her pregnancy with sensation of palpitations and also shortness of breath, particularly in her last trimester. The palpitations have continued since delivery without any periods of remission, however she feels as though her SOB improved initially post-partum but has since recurred over the last day or two. Only recently, she developed a dry cough and progression of SOB on exertion, also bilateral lower extremity edema. She reports orthopnea but denies PND. She also denies any other cardiac history and history of DVT/ PE. Denies calf tenderness. She has been taking labetalol 200 TID since delivery and home BP monitoring has been 140s/100 per patient.     In the ER, patient noted to be tachycardic to the 120s, also hypertensive to max 180s/120s, also tachypneic to the 30s, afebrile. Admission chest X ray showed evidence of pulmonary edema, also small right-sided pleural effusion and enlarged cardiac sillhouette. EKG notable for TWI in lateral leads. CBC and CMP unremarkable. First set of cardiac enzymes negative. Pro-BNP elevated at 5700. Patient given hydralazine 5mg IVP, also 200mg PO Labetalol x 1. Additionally, patient started on Keppra for seizure prophylaxis. Taken to echo lab for TTE, per unofficial read evidence of severe systolic LV dysfunction and tricuspid regurgitation, final read pending.    Hospital Course:  Patient was admitted to the CCU from echo suite and started on nitroglycerin infusion for acute management of blood pressure with goal mean arterial pressures of 100-110. She was also given a single dose of Lasix 40mg IV push. Cardiac enzymes were not trended as her chest pain was believed unlikely to be ischemic given her negative cardiac history, age and lack of comorbidities paired with non-ischemic admission EKG and initial negative set of cardiac enyzmes. Nitroglycerin infusion was ultimately discontinued that evening, as mean arterial pressures averaged in the 60s. With improved blood pressures and greater than two liters of urine output with intravenous Lasix, patient did not report any further chest pain and her dyspnea, while persistent, improved. She, however, remained tachycardic with rate in the low 100s.     Transthoracic echocardiogram returned concerning findings of severe global systolic left ventricular dysfunction with ejection fraction of 24%, also mild right ventricular systolic dysfunction, mild-moderate mitral regurgitation and severe pulmonary hypertension. 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 The right ventricular systolic dysfunction was believed in this setting to be the result of progressive left-sided heart failure. Similarly, her severe pulmonary hypertension was thought most likely class II in setting of heart failure rather than suggestive of alternative etiology such as pulmonary embolus. She was started on Lisinopril 2.5mg daily initially; beta blockers were held for a time owing to concerns that lowering her heart rate may exacerbate her cardiac output. She was also started on maintenance diuresis with 40mg oral Lasix twice daily. Per discussion with ob/gyn, Keppra was discontinued as the etiology of her symptoms was unlikely to be post-partum pre-eclampsia. Cardiac medication were adjusted and heart failure service is consulted. Coreg, captopril, and digoxin were added. Right heart cath was deferred. 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 Repeat TTE showed stable low EF but improved pulmonary pressures. HPI:  25yo F  history of gestational HTN in recent pregnancy on labetalol outpatient, now one month post-partum from non-elective  for pre-eclampsia at 37w5d, presenting with chief complaint of chest pain and shortness of breath x 1 day. Per patient, for the past day or two she has been experiencing dyspnea on exertion also associated with palpitations and now chest tightness worse on exertion. Of note patient reports having had a fast heart rate throughout her pregnancy with sensation of palpitations and also shortness of breath, particularly in her last trimester. The palpitations have continued since delivery without any periods of remission, however she feels as though her SOB improved initially post-partum but has since recurred over the last day or two. Only recently, she developed a dry cough and progression of SOB on exertion, also bilateral lower extremity edema. She reports orthopnea but denies PND. She also denies any other cardiac history and history of DVT/ PE. Denies calf tenderness. She has been taking labetalol 200 TID since delivery and home BP monitoring has been 140s/100 per patient.     In the ER, patient noted to be tachycardic to the 120s, also hypertensive to max 180s/120s, also tachypneic to the 30s, afebrile. Admission chest X ray showed evidence of pulmonary edema, also small right-sided pleural effusion and enlarged cardiac sillhouette. EKG notable for TWI in lateral leads. CBC and CMP unremarkable. First set of cardiac enzymes negative. Pro-BNP elevated at 5700. Patient given hydralazine 5mg IVP, also 200mg PO Labetalol x 1. Additionally, patient started on Keppra for seizure prophylaxis. Taken to echo lab for TTE, per unofficial read evidence of severe systolic LV dysfunction and tricuspid regurgitation, final read pending.    Hospital Course:  Patient was admitted to the CCU from echo suite and started on nitroglycerin infusion for acute management of blood pressure with goal mean arterial pressures of 100-110. She was also given a single dose of Lasix 40mg IV push. Cardiac enzymes were not trended as her chest pain was believed unlikely to be ischemic given her negative cardiac history, age and lack of comorbidities paired with non-ischemic admission EKG and initial negative set of cardiac enyzmes. Nitroglycerin infusion was ultimately discontinued that evening, as mean arterial pressures averaged in the 60s. With improved blood pressures and greater than two liters of urine output with intravenous Lasix, patient did not report any further chest pain and her dyspnea, while persistent, improved. She, however, remained tachycardic with rate in the low 100s.     Transthoracic echocardiogram returned concerning findings of severe global systolic left ventricular dysfunction with ejection fraction of 24%, also mild right ventricular systolic dysfunction, mild-moderate mitral regurgitation and severe pulmonary hypertension. 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 The right ventricular systolic dysfunction was believed in this setting to be the result of progressive left-sided heart failure. Similarly, her severe pulmonary hypertension was thought most likely class II in setting of heart failure rather than suggestive of alternative etiology such as pulmonary embolus. She was started on Lisinopril 2.5mg daily initially; beta blockers were held for a time owing to concerns that lowering her heart rate may exacerbate her cardiac output. She was also started on maintenance diuresis with 40mg oral Lasix twice daily. Per discussion with ob/gyn, Keppra was discontinued as the etiology of her symptoms was unlikely to be post-partum pre-eclampsia. Cardiac medication were adjusted and heart failure service is consulted. Coreg, captopril, and digoxin were added. Right heart cath was deferred. 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 Repeat TTE showed stable low EF but improved pulmonary pressures. Patient was changed from captopril to enalapril. Pt declined use of bromocriptine for her cardiomyopathy. Patient was transitioned to warfarin. HPI:  25yo F  history of gestational HTN in recent pregnancy on labetalol outpatient, now one month post-partum from non-elective  for pre-eclampsia at 37w5d, presenting with chief complaint of chest pain and shortness of breath x 1 day. Per patient, for the past day or two she has been experiencing dyspnea on exertion also associated with palpitations and now chest tightness worse on exertion. Of note patient reports having had a fast heart rate throughout her pregnancy with sensation of palpitations and also shortness of breath, particularly in her last trimester. The palpitations have continued since delivery without any periods of remission, however she feels as though her SOB improved initially post-partum but has since recurred over the last day or two. Only recently, she developed a dry cough and progression of SOB on exertion, also bilateral lower extremity edema. She reports orthopnea but denies PND. She also denies any other cardiac history and history of DVT/ PE. Denies calf tenderness. She has been taking labetalol 200 TID since delivery and home BP monitoring has been 140s/100 per patient.     In the ER, patient noted to be tachycardic to the 120s, also hypertensive to max 180s/120s, also tachypneic to the 30s, afebrile. Admission chest X ray showed evidence of pulmonary edema, also small right-sided pleural effusion and enlarged cardiac sillhouette. EKG notable for TWI in lateral leads. CBC and CMP unremarkable. First set of cardiac enzymes negative. Pro-BNP elevated at 5700. Patient given hydralazine 5mg IVP, also 200mg PO Labetalol x 1. Additionally, patient started on Keppra for seizure prophylaxis. Taken to echo lab for TTE, per unofficial read evidence of severe systolic LV dysfunction and tricuspid regurgitation, final read pending.    Hospital Course:  Patient was admitted to the CCU from echo suite and started on nitroglycerin infusion for acute management of blood pressure with goal mean arterial pressures of 100-110. She was also given a single dose of Lasix 40mg IV push. Cardiac enzymes were not trended as her chest pain was believed unlikely to be ischemic given her negative cardiac history, age and lack of comorbidities paired with non-ischemic admission EKG and initial negative set of cardiac enyzmes. Nitroglycerin infusion was ultimately discontinued that evening, as mean arterial pressures averaged in the 60s. With improved blood pressures and greater than two liters of urine output with intravenous Lasix, patient did not report any further chest pain and her dyspnea, while persistent, improved. She, however, remained tachycardic with rate in the low 100s.     Transthoracic echocardiogram returned concerning findings of severe global systolic left ventricular dysfunction with ejection fraction of 24%, also mild right ventricular systolic dysfunction, mild-moderate mitral regurgitation and severe pulmonary hypertension. 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 The right ventricular systolic dysfunction was believed in this setting to be the result of progressive left-sided heart failure. Similarly, her severe pulmonary hypertension was thought most likely class II in setting of heart failure rather than suggestive of alternative etiology such as pulmonary embolus. She was started on Lisinopril 2.5mg daily initially; beta blockers were held for a time owing to concerns that lowering her heart rate may exacerbate her cardiac output. She was also started on maintenance diuresis with 40mg oral Lasix twice daily. Per discussion with ob/gyn, Keppra was discontinued as the etiology of her symptoms was unlikely to be post-partum pre-eclampsia. Cardiac medication were adjusted and heart failure service is consulted. Coreg, captopril, and digoxin were added. Right heart cath was deferred. 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 Repeat TTE showed stable low EF but improved pulmonary pressures. Patient was changed from captopril to enalapril. Pt declined use of bromocriptine for her cardiomyopathy. Patient was transitioned to warfarin. Patient was taught to inject Lovenox for bridging with coumadin and is to follow up with PCP for INR check on Tuesday. Patient to also follow up with Heart Failure Physician in 1 week. HPI:  27yo F  history of gestational HTN in recent pregnancy on labetalol outpatient, now one month post-partum from non-elective  for pre-eclampsia at 37w5d, presenting with chief complaint of chest pain and shortness of breath x 1 day. Per patient, for the past day or two she has been experiencing dyspnea on exertion also associated with palpitations and now chest tightness worse on exertion. Of note patient reports having had a fast heart rate throughout her pregnancy with sensation of palpitations and also shortness of breath, particularly in her last trimester. The palpitations have continued since delivery without any periods of remission, however she feels as though her SOB improved initially post-partum but has since recurred over the last day or two. Only recently, she developed a dry cough and progression of SOB on exertion, also bilateral lower extremity edema. She reports orthopnea but denies PND. She also denies any other cardiac history and history of DVT/ PE. Denies calf tenderness. She has been taking labetalol 200 TID since delivery and home BP monitoring has been 140s/100 per patient.     In the ER, patient noted to be tachycardic to the 120s, also hypertensive to max 180s/120s, also tachypneic to the 30s, afebrile. Admission chest X ray showed evidence of pulmonary edema, also small right-sided pleural effusion and enlarged cardiac sillhouette. EKG notable for TWI in lateral leads. CBC and CMP unremarkable. First set of cardiac enzymes negative. Pro-BNP elevated at 5700. Patient given hydralazine 5mg IVP, also 200mg PO Labetalol x 1. Additionally, patient started on Keppra for seizure prophylaxis. Taken to echo lab for TTE, per unofficial read evidence of severe systolic LV dysfunction and tricuspid regurgitation, final read pending.    Hospital Course:  Patient was admitted to the CCU from echo suite and started on nitroglycerin infusion for acute management of blood pressure with goal mean arterial pressures of 100-110. She was also given a single dose of Lasix 40mg IV push. Cardiac enzymes were not trended as her chest pain was believed unlikely to be ischemic given her negative cardiac history, age and lack of comorbidities paired with non-ischemic admission EKG and initial negative set of cardiac enyzmes. Nitroglycerin infusion was ultimately discontinued that evening, as mean arterial pressures averaged in the 60s. With improved blood pressures and greater than two liters of urine output with intravenous Lasix, patient did not report any further chest pain and her dyspnea, while persistent, improved. She, however, remained tachycardic with rate in the low 100s.     Transthoracic echocardiogram returned concerning findings of severe global systolic left ventricular dysfunction with ejection fraction of 24%, also mild right ventricular systolic dysfunction, mild-moderate mitral regurgitation and severe pulmonary hypertension. 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 The right ventricular systolic dysfunction was believed in this setting to be the result of progressive left-sided heart failure. Similarly, her severe pulmonary hypertension was thought most likely class II in setting of heart failure rather than suggestive of alternative etiology such as pulmonary embolus. She was started on Lisinopril 2.5mg daily initially; beta blockers were held for a time owing to concerns that lowering her heart rate may exacerbate her cardiac output. She was also started on maintenance diuresis with 40mg oral Lasix twice daily. Per discussion with ob/gyn, Keppra was discontinued as the etiology of her symptoms was unlikely to be post-partum pre-eclampsia. Cardiac medication were adjusted and heart failure service is consulted. Coreg, captopril, and digoxin were added. Right heart cath was deferred. 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 Repeat TTE showed stable low EF but improved pulmonary pressures. Patient was changed from captopril to enalapril. Pt declined use of bromocriptine for her cardiomyopathy. Patient was transitioned to warfarin. Patient was taught to inject Lovenox for bridging with coumadin and is to follow up with PCP for INR check on Tuesday. Patient to also follow up with Heart Failure Physician in 1 week. * Of note Patient urinated and noted blood on tissue paper for the first time since her  on 16 ( LMP 16). D/w CCU NP will hold off discharge to monitor patient overnight,  H/H 11.5/37.3 stable. Curbsided GYN team differential can be Patient's menses vs vaginal spotting post operatively, which can occur up to 6 weeks postop. Patient had no recurrent bleeding overnight and h/h remained stable Patient now stable for d/c home.

## 2017-05-19 NOTE — DISCHARGE NOTE ADULT - ADDITIONAL INSTRUCTIONS
follow up with Heart follow up with Heart Failure Physician Dr. Mace in 1 week call for an appointment   follow up with your Primary Care Physician by Tuesday to have your INR level checked.  Please follow up with your OBGYN doctor within 1 week of discharge. follow up with Heart Failure Physician Dr. Mace in 1 week call for an appointment   follow up with your Primary Care Physician Dr. Burr by Tuesday to have your INR level checked.  Please follow up with your OBGYN doctor within 1 week of discharge.

## 2017-05-19 NOTE — DISCHARGE NOTE ADULT - PROVIDER TOKENS
TOKNAHOMY:'[id number]' TOKEN:'[id number]',TOKEN:'[id number]' TOKEN:'[id number]:'[id number]',TOKEN:'[id number]'

## 2017-05-19 NOTE — DISCHARGE NOTE ADULT - CARE PROVIDERS DIRECT ADDRESSES
[email or web address]

## 2017-05-19 NOTE — PROGRESS NOTE ADULT - ASSESSMENT
27 y/o  s/p primary c/s on 17 complicated by preclampsia without severe features, admitted with postpartum cardiomyopathy.

## 2017-05-19 NOTE — PROGRESS NOTE ADULT - ATTENDING COMMENTS
26 year old woman  sp  approx 4-5 weeks ago due to pre-eclampsia who comes in with acute systolic heart failure likely secondary to peripartum cardiomyopathy.  Patient responded well to IV diuresis last night and is feeling better. Remains tachycardic.  -will add lisinopril 2.5 mg Daily and eventual beta blocker to regimen  -add lasix 40 mg PO BID  -discussed at length with patient and patient's .

## 2017-05-19 NOTE — DISCHARGE NOTE ADULT - CARE PLAN
Principal Discharge DX:	Cardiomyopathy, peripartum, postpartum  Goal:	Improvement of heart function  Instructions for follow-up, activity and diet:	You had poor heart function during your hospitalization that is likely related to your pregnancy status. You were placed on medications that can help your heart heal as well as control your symptoms. You are also given a lifevest to protect your heart in case your heart were to go into a dangerous rhythm -- please wear your vest at all times. Please also continue to take your medications as prescribed and follow up with cardiology and heart failure within 1 week of discharge. If you notice any chest pain/discomfort, shortness of breath, palpitations, or swelling, please seek medical care immediately. Your heart function is still quite poor and requires close medical attention.  Secondary Diagnosis:	S/P   Instructions for follow-up, activity and diet:	Please follow up with your OBGYN doctor within 1 week of discharge. Principal Discharge DX:	Cardiomyopathy, peripartum, postpartum  Goal:	Improvement of heart function  Instructions for follow-up, activity and diet:	You had poor heart function during your hospitalization that is likely related to your pregnancy status. You were placed on medications that can help your heart heal as well as control your symptoms. You are also given a life vest to protect your heart in case your heart were to go into a dangerous rhythm -- please wear your vest at all times. Please also continue to take your medications as prescribed and follow up with cardiology and heart failure within 1 week of discharge. If you notice any chest pain/discomfort, shortness of breath, palpitations, or swelling, please seek medical care immediately. Your heart function is still quite poor and requires close medical attention.  Secondary Diagnosis:	S/P   Instructions for follow-up, activity and diet:	Please follow up with your OBGYN doctor within 1 week of discharge. Principal Discharge DX:	Cardiomyopathy, peripartum, postpartum  Goal:	Improvement of heart function  Instructions for follow-up, activity and diet:	You had poor heart function during your hospitalization that is likely related to your pregnancy status. You were placed on medications that can help your heart heal as well as control your symptoms. You are also given a life vest to protect your heart in case your heart were to go into a dangerous rhythm -- please wear your vest at all times. Please also continue to take your medications as prescribed and follow up with cardiology and heart failure within 1 week of discharge. If you notice any chest pain/discomfort, shortness of breath, palpitations, or swelling, please seek medical care immediately. Your heart function is still quite poor and requires close medical attention. You were started on Lovenox injection 2 x a day to bridge coumadin until levels are therapeutic goal is INR 2-3. You will have to follow up with your Primary Care Physician by Tuesday to have your INR level checked.  Secondary Diagnosis:	S/P   Instructions for follow-up, activity and diet:	Please follow up with your OBGYN doctor within 1 week of discharge.

## 2017-05-19 NOTE — PROGRESS NOTE ADULT - PROBLEM SELECTOR PLAN 4
--ob/gyn on board and following  --keppra prophylaxis as above as post-partum pre-eclampsia cannot be excluded in Ddx   --pending workup and diagnosis, will need to discuss with patient if she is breastfeeding as will be a consideration in pharmacotherapy choice --ob/gyn on board, will continue to follow recs

## 2017-05-19 NOTE — PROGRESS NOTE ADULT - PROBLEM SELECTOR PLAN 1
1) CV: Postpartum Cardiomyopathy: s/p IV Lasix overnight with improvement in BPs and patient's symptoms.  Continue to monitor BPs and I/Os.   Appreciate CCU care.  2) Pulm: O2 sats stable.  3) GI: Regular diet  4) : Monitor I/Os.  s/p IV Lasix with adequate UOP.  5) Heme: DVT Prophylaxis: HSQ, venodynes, ambulation as tolerated.

## 2017-05-19 NOTE — DISCHARGE NOTE ADULT - MEDICATION SUMMARY - MEDICATIONS TO TAKE
I will START or STAY ON the medications listed below when I get home from the hospital:    enalapril 10 mg oral tablet  -- 1 tab(s) by mouth 2 times a day  -- Indication: For Postpartum cardiomyopathy    digoxin 125 mcg (0.125 mg) oral tablet  -- 1 tab(s) by mouth once a day  -- Indication: For Postpartum cardiomyopathy    Coumadin 5 mg oral tablet  -- 1 tab(s) by mouth once a day  -- Do not take this drug if you are pregnant.  It is very important that you take or use this exactly as directed.  Do not skip doses or discontinue unless directed by your doctor.  Obtain medical advice before taking any non-prescription drugs as some may affect the action of this medication.    -- Indication: For Prevention of LV thrombus     enoxaparin 120 mg/0.8 mL injectable solution  -- 110 milligram(s) injectable 2 times a day  -- Indication: For Prevention of LV thrombus     carvedilol 3.125 mg oral tablet  -- 3 tab(s) by mouth every 12 hours  -- Indication: For Postpartum cardiomyopathy    furosemide 40 mg oral tablet  -- 1 tab(s) by mouth once a day  -- Indication: For Postpartum cardiomyopathy    Prenatal Multivitamins with Folic Acid 1 mg oral tablet  -- 1 tab(s) by mouth once a day  -- Indication: For Supplement     docusate sodium 100 mg oral capsule  -- 1 cap(s) by mouth 3 times a day  -- Indication: For Stool softner     senna oral tablet  -- 2 tab(s) by mouth once a day (at bedtime)  -- Indication: For Stool softner

## 2017-05-19 NOTE — DISCHARGE NOTE ADULT - CARE PROVIDER_API CALL
Miguelina Land (MD), Cardiovascular Disease  [address] [place] [address]  Phone: [phone]  Fax: [phone] Miguelina Land (MD), Cardiovascular Disease  [address] [place] [address]  Phone: [phone]  Fax: [phone]    James Mace (MIGDALIA), Cardiovascular Disease; Internal Medicine  [address] [place] [address]  Phone: [phone]  Fax: [phone] Miguelina Land (MD), Cardiovascular Disease  [address] [place] [address]  Phone: [phone]  Fax: [phone]    James Mace (MIGDALIA), Cardiovascular Disease; Internal Medicine  [address] [place] [address]  Phone: [phone]  Fax: [phone]    Aziza Burr (DO), Internal Medicine  [address] [place] [address]  Phone: [phone]  Fax: [phone]

## 2017-05-19 NOTE — PROGRESS NOTE ADULT - ASSESSMENT
25yo F  history of gestational HTN in recent pregnancy on labetalol outpatient, now one month post-partum from non-elective  for pre-eclampsia at 37w5d, presenting with chief complaint of shortness of breath, found to have hypertensive urgency as well as pulmonary edema and elevated pro-BNP, with severe global systolic LV dysfunction and EF of 24% consistent with likely postpartum cardiomyopathy, on nitro gtt with improved pressures.

## 2017-05-19 NOTE — PROGRESS NOTE ADULT - PROBLEM SELECTOR PLAN 2
--primary differential diagnosis post-partum cardiomyopathy, ddx also includes PE vs ACS vs HTN  --presentation given orthopnea, pulmonary edema, enlarged cardiac silhouette and elevated pro-BNP > 5000, also with informal read of TTE showing LV systolic dysfunction, most concerning for post-partum cardiomyopathy  --given recent pregnancy and prothrombotic state, tachycardia and chest pain also will rule out PE  --despite chest pain and TWI on admission EKG, low suspicion for ischemic etiology given alternative diagnoses more likely, initial cardiac enzymes negative  --follow up definitive read of TTE. If no evidence of right heart strain no need for further evaluation of PE, however if evidence of RV strain or if TTE grossly normal will obtain CTA chest  --40mg IVP Lasix x 1 now   --BP control to reduce afterload with nitro gtt as above --patient with history of post partum HTN on outpatient labetalol - unclear if dx truly PPHTN vs HTN as manifestation of developing PPCM  --presented with hypertensive urgency, max pressures 190s/120s with evidence of pulmonary edema on CXR, though less likely end-organ damage 2/2 HTN and more likely related to systolic dysfunction  --s/p hydralazine 5mg IVP x 1 and labetalol 200mg PO x 1 in ER, nitro gtt on admission to CCU now d/c'd for MAPs ~60s  --if pressures remain well controlled off nitro gtt and with PO meds only, will discontinue Keppra for seizure prophylaxis.

## 2017-05-19 NOTE — PROGRESS NOTE ADULT - SUBJECTIVE AND OBJECTIVE BOX
CCU Progress Note    S/24 Events: No acute events overnight.     24h tele events:    O:  T(C): 37, Max: 37.2 ( @ 17:30)  HR: 99 (97 - 120)  BP: 121/64 (91/57 - 183/125)  RR: 22 (19 - 37)  SpO2: 98% (95% - 100%)  Wt(kg): --  Daily Height in cm: 167.64 (18 May 2017 17:30)    Daily Weight in k.6 (19 May 2017 06:00)    I & Os for current day (as of  @ 06:48)  =============================================  IN: 94 ml / OUT: 2500 ml / NET: -2406 ml      Gen: NAD  HEENT: EOMI  CV: RRR, normal S1 + S2, no m/r/g  Lungs: CTAB  Abd: soft, non-tender  Ext: No edema    Labs:                        10.9   8.57  )-----------( 387      ( 18 May 2017 14:00 )             36.3     05-18    145  |  105  |  15  ----------------------------<  77  3.9   |  22  |  0.96    Ca    9.8      18 May 2017 12:46  Phos  3.3       Mg     2.0         TPro  8.8<H>  /  Alb  4.4  /  TBili  0.4  /  DBili  x   /  AST  21  /  ALT  19  /  AlkPhos  95  05-18      CARDIAC MARKERS ( 18 May 2017 12:46 )  x     / < 0.06 ng/mL / 117 u/L / x     / x        Echocardiogram:  EF 24% (Teicholz)  1. Normal mitral valve. Mild-moderate mitral regurgitation.  2. Moderately dilated left atrium.  LA volume index = 43  cc/m2.  3. Mild left ventricular enlargement.  4. Severe global left ventricular systolic dysfunction.  5. Normal right ventricular size with decreased right  ventricular systolic function.  6. Estimated right ventricular systolic pressure equals 61  mm Hg, assuming right atrial pressure equals 10 mm Hg,  consistent with severe pulmonary hypertension.      Meds:  MEDICATIONS  (STANDING):  nitroglycerin  Infusion 10MICROgram(s)/Min IV Continuous <Continuous>  heparin  Injectable 5000Unit(s) SubCutaneous every 8 hours          Korin Land M.D. PGY-1 CCU Progress Note    S/24 Events: No acute events overnight. Diuresed 2L after 40mg IVP of Lasix yesterday with symptomatic improvement. No reports of SOB ON. MAPs of ~60s on nitro gtt, d/c'd by night team, remains off at present. This AM, says she feels much improved. Still experiencing some SOB and palpitations when changing position, otherwise asymptomatic. Denies chest pain. No orthopnea, slept with one pillow only.     O:  T(C): 37, Max: 37.2 ( @ 17:30)  HR: 99 (97 - 120)  BP: 121/64 (91/57 - 183/125)  RR: 22 (19 - 37)  SpO2: 98% (95% - 100%)  Wt(kg): --  Daily Height in cm: 167.64 (18 May 2017 17:30)    Daily Weight in k.6 (19 May 2017 06:00)    I & Os for current day (as of  @ 07:13)  =============================================  IN: 94 ml / OUT: 2500 ml / NET: -2406 ml      Gen: asleep, arousable, NAD, pleasant  HEENT: clear conjunctiva, MMM  CV: tachycardic, normal S1 + S2, no m/r/g  Lungs: CTAB, no c/w/r  Abd: obese, soft, non-tender, NABS, well-healed LTCS surgical scar c/d/i  Ext: No peripheral edema    Labs:                        11.1   8.10  )-----------( 376      ( 19 May 2017 06:00 )             35.9     05-18    145  |  105  |  15  ----------------------------<  77  3.9   |  22  |  0.96    Ca    9.8      18 May 2017 12:46  Phos  3.3     05-18  Mg     2.0     -18    TPro  8.8<H>  /  Alb  4.4  /  TBili  0.4  /  DBili  x   /  AST  21  /  ALT  19  /  AlkPhos  95  05-18    PT/INR - ( 19 May 2017 06:00 )   PT: 12.6 SEC;   INR: 1.12          PTT - ( 19 May 2017 06:00 )  PTT:34.3 SEC  CARDIAC MARKERS ( 18 May 2017 12:46 )  x     / < 0.06 ng/mL / 117 u/L / x     / x        Echocardiogram:  EF 24% (Teicholz)  1. Normal mitral valve. Mild-moderate mitral regurgitation.  2. Moderately dilated left atrium.  LA volume index = 43  cc/m2.  3. Mild left ventricular enlargement.  4. Severe global left ventricular systolic dysfunction.  5. Normal right ventricular size with decreased right  ventricular systolic function.  6. Estimated right ventricular systolic pressure equals 61  mm Hg, assuming right atrial pressure equals 10 mm Hg,  consistent with severe pulmonary hypertension.      Meds:  MEDICATIONS  (STANDING):  nitroglycerin  Infusion 10MICROgram(s)/Min IV Continuous <Continuous>  heparin  Injectable 5000Unit(s) SubCutaneous every 8 hours          Korin Land M.D. PGY-1 CCU Progress Note    S/24 Events: No acute events overnight. Diuresed 2L after 40mg IVP of Lasix yesterday with symptomatic improvement. No reports of SOB ON. MAPs of ~60s on nitro gtt, d/c'd by night team, remains off at present. This AM, says she feels much improved. Still experiencing some SOB and palpitations when changing position, otherwise asymptomatic. Denies chest pain. No orthopnea, slept with one pillow only.     24h Tele: sinus tachycardia HR ~100s    O:  T(C): 37, Max: 37.2 ( @ 17:30)  HR: 99 (97 - 120)  BP: 121/64 (91/57 - 183/125)  RR: 22 (19 - 37)  SpO2: 98% (95% - 100%)  Wt(kg): --  Daily Height in cm: 167.64 (18 May 2017 17:30)    Daily Weight in k.6 (19 May 2017 06:00)    I & Os for current day (as of  @ 07:13)  =============================================  IN: 94 ml / OUT: 2500 ml / NET: -2406 ml      Gen: asleep, arousable, NAD, pleasant  HEENT: clear conjunctiva, MMM  CV: tachycardic, normal S1 + S2, no m/r/g  Lungs: CTAB, no c/w/r  Abd: obese, soft, non-tender, NABS, well-healed LTCS surgical scar c/d/i  Ext: No peripheral edema    Labs:                        11.1   8.10  )-----------( 376      ( 19 May 2017 06:00 )             35.9     05-18    145  |  105  |  15  ----------------------------<  77  3.9   |  22  |  0.96    Ca    9.8      18 May 2017 12:46  Phos  3.3     05-18  Mg     2.0     05-18    TPro  8.8<H>  /  Alb  4.4  /  TBili  0.4  /  DBili  x   /  AST  21  /  ALT  19  /  AlkPhos  95  05-18    PT/INR - ( 19 May 2017 06:00 )   PT: 12.6 SEC;   INR: 1.12          PTT - ( 19 May 2017 06:00 )  PTT:34.3 SEC  CARDIAC MARKERS ( 18 May 2017 12:46 )  x     / < 0.06 ng/mL / 117 u/L / x     / x        Echocardiogram:  EF 24% (Teicholz)  1. Normal mitral valve. Mild-moderate mitral regurgitation.  2. Moderately dilated left atrium.  LA volume index = 43  cc/m2.  3. Mild left ventricular enlargement.  4. Severe global left ventricular systolic dysfunction.  5. Normal right ventricular size with decreased right  ventricular systolic function.  6. Estimated right ventricular systolic pressure equals 61  mm Hg, assuming right atrial pressure equals 10 mm Hg,  consistent with severe pulmonary hypertension.      Meds:  MEDICATIONS  (STANDING):  nitroglycerin  Infusion 10MICROgram(s)/Min IV Continuous <Continuous>  heparin  Injectable 5000Unit(s) SubCutaneous every 8 hours          Korin Land M.D. PGY-1

## 2017-05-19 NOTE — PROGRESS NOTE ADULT - SUBJECTIVE AND OBJECTIVE BOX
S: Patient seen and examined @ 6:45am.  Patient reports improvement in her symptoms.  Pt reports some SOB, however denies any orthopnea, chest pain, palpitations, HA, dizziness.  +voiding.  Tolerating regular diet.        O: VS: T(C): 36.9, Max: 37 (05-19 @ 04:00)  HR: 114 (97 - 114)  BP: 134/80 (91/57 - 158/74)  RR: 18 (18 - 34)  SpO2: 100% (95% - 100%)  Wt(kg): --  I/Os:I & Os for 24h ending 05-19 @ 07:00  =============================================  IN: 94 ml / OUT: 2500 ml / NET: -2406 ml    I & Os for current day (as of 05-19 @ 19:59)  =============================================  IN: 660 ml / OUT: 2000 ml / NET: -1340 ml    Gen: NAD.  CV: Tachycardic.  No murmurs.  Pulm:   Abd: Soft, NT, ND.  +bowel sounds  Ext: NT bilaterally    Labs:  WBC: 8.57->8.10    Hb: 10.9->11.1      Hct: 36.3->35.9   Plt: 387->376  Cr: 0.96->0.88   TSH 1.63   Trop T: <0.06,   Pro BNP 5728

## 2017-05-19 NOTE — DISCHARGE NOTE ADULT - PLAN OF CARE
Please follow up with your OBGYN doctor within 1 week of discharge. Improvement of heart function You had poor heart function during your hospitalization that is likely related to your pregnancy status. You were placed on medications that can help your heart heal as well as control your symptoms. You are also given a lifevest to protect your heart in case your heart were to go into a dangerous rhythm -- please wear your vest at all times. Please also continue to take your medications as prescribed and follow up with cardiology and heart failure within 1 week of discharge. If you notice any chest pain/discomfort, shortness of breath, palpitations, or swelling, please seek medical care immediately. Your heart function is still quite poor and requires close medical attention. You had poor heart function during your hospitalization that is likely related to your pregnancy status. You were placed on medications that can help your heart heal as well as control your symptoms. You are also given a life vest to protect your heart in case your heart were to go into a dangerous rhythm -- please wear your vest at all times. Please also continue to take your medications as prescribed and follow up with cardiology and heart failure within 1 week of discharge. If you notice any chest pain/discomfort, shortness of breath, palpitations, or swelling, please seek medical care immediately. Your heart function is still quite poor and requires close medical attention. You had poor heart function during your hospitalization that is likely related to your pregnancy status. You were placed on medications that can help your heart heal as well as control your symptoms. You are also given a life vest to protect your heart in case your heart were to go into a dangerous rhythm -- please wear your vest at all times. Please also continue to take your medications as prescribed and follow up with cardiology and heart failure within 1 week of discharge. If you notice any chest pain/discomfort, shortness of breath, palpitations, or swelling, please seek medical care immediately. Your heart function is still quite poor and requires close medical attention. You were started on Lovenox injection 2 x a day to bridge coumadin until levels are therapeutic goal is INR 2-3. You will have to follow up with your Primary Care Physician by Tuesday to have your INR level checked.

## 2017-05-19 NOTE — DISCHARGE NOTE ADULT - PATIENT PORTAL LINK FT
“You can access the Follow[facility] Patient Portal, offered by [facility], by registering with the following website: [email or web address]”

## 2017-05-19 NOTE — PROGRESS NOTE ADULT - PROBLEM SELECTOR PLAN 3
--unlikely ischemic given negative cardiac enzymes x 1, negative prior cardiac history and diagnostic evidence to suggest symptoms related either to hypertension and/or pulmonary edema   --Tylenol PRN for pain. If chest pain significantly worsens will obtain second set of cardiac enzymes and repeat 12-lead EKG --now resolved  --unlikely ischemic given negative cardiac enzymes x 1, negative prior cardiac history and diagnostic evidence to suggest symptoms related either to hypertension and/or pulmonary edema at presentation  --if chest pain recurs, will obtain second set of cardiac enzymes and repeat 12-lead EKG

## 2017-05-20 LAB
BUN SERPL-MCNC: 15 MG/DL — SIGNIFICANT CHANGE UP (ref 7–23)
CALCIUM SERPL-MCNC: 9.8 MG/DL — SIGNIFICANT CHANGE UP (ref 8.4–10.5)
CHLORIDE SERPL-SCNC: 102 MMOL/L — SIGNIFICANT CHANGE UP (ref 98–107)
CO2 SERPL-SCNC: 24 MMOL/L — SIGNIFICANT CHANGE UP (ref 22–31)
CREAT SERPL-MCNC: 0.91 MG/DL — SIGNIFICANT CHANGE UP (ref 0.5–1.3)
GLUCOSE SERPL-MCNC: 92 MG/DL — SIGNIFICANT CHANGE UP (ref 70–99)
HCT VFR BLD CALC: 38.4 % — SIGNIFICANT CHANGE UP (ref 34.5–45)
HGB BLD-MCNC: 11.6 G/DL — SIGNIFICANT CHANGE UP (ref 11.5–15.5)
MAGNESIUM SERPL-MCNC: 2.1 MG/DL — SIGNIFICANT CHANGE UP (ref 1.6–2.6)
MCHC RBC-ENTMCNC: 23.7 PG — LOW (ref 27–34)
MCHC RBC-ENTMCNC: 30.2 % — LOW (ref 32–36)
MCV RBC AUTO: 78.5 FL — LOW (ref 80–100)
PHOSPHATE SERPL-MCNC: 3.7 MG/DL — SIGNIFICANT CHANGE UP (ref 2.5–4.5)
PLATELET # BLD AUTO: 383 K/UL — SIGNIFICANT CHANGE UP (ref 150–400)
PMV BLD: 9.9 FL — SIGNIFICANT CHANGE UP (ref 7–13)
POTASSIUM SERPL-MCNC: 3.9 MMOL/L — SIGNIFICANT CHANGE UP (ref 3.5–5.3)
POTASSIUM SERPL-SCNC: 3.9 MMOL/L — SIGNIFICANT CHANGE UP (ref 3.5–5.3)
RBC # BLD: 4.89 M/UL — SIGNIFICANT CHANGE UP (ref 3.8–5.2)
RBC # FLD: 16.8 % — HIGH (ref 10.3–14.5)
SODIUM SERPL-SCNC: 143 MMOL/L — SIGNIFICANT CHANGE UP (ref 135–145)
WBC # BLD: 8.83 K/UL — SIGNIFICANT CHANGE UP (ref 3.8–10.5)
WBC # FLD AUTO: 8.83 K/UL — SIGNIFICANT CHANGE UP (ref 3.8–10.5)

## 2017-05-20 PROCEDURE — 99233 SBSQ HOSP IP/OBS HIGH 50: CPT

## 2017-05-20 PROCEDURE — 93010 ELECTROCARDIOGRAM REPORT: CPT

## 2017-05-20 RX ORDER — FUROSEMIDE 40 MG
40 TABLET ORAL DAILY
Qty: 0 | Refills: 0 | Status: DISCONTINUED | OUTPATIENT
Start: 2017-05-20 | End: 2017-05-20

## 2017-05-20 RX ORDER — LISINOPRIL 2.5 MG/1
5 TABLET ORAL DAILY
Qty: 0 | Refills: 0 | Status: DISCONTINUED | OUTPATIENT
Start: 2017-05-20 | End: 2017-05-22

## 2017-05-20 RX ORDER — CARVEDILOL PHOSPHATE 80 MG/1
3.12 CAPSULE, EXTENDED RELEASE ORAL EVERY 12 HOURS
Qty: 0 | Refills: 0 | Status: DISCONTINUED | OUTPATIENT
Start: 2017-05-20 | End: 2017-05-21

## 2017-05-20 RX ORDER — FUROSEMIDE 40 MG
40 TABLET ORAL
Qty: 0 | Refills: 0 | Status: DISCONTINUED | OUTPATIENT
Start: 2017-05-20 | End: 2017-05-21

## 2017-05-20 RX ORDER — METOPROLOL TARTRATE 50 MG
25 TABLET ORAL
Qty: 0 | Refills: 0 | Status: DISCONTINUED | OUTPATIENT
Start: 2017-05-20 | End: 2017-05-20

## 2017-05-20 RX ORDER — FOLIC ACID 0.8 MG
1 TABLET ORAL DAILY
Qty: 0 | Refills: 0 | Status: DISCONTINUED | OUTPATIENT
Start: 2017-05-20 | End: 2017-05-28

## 2017-05-20 RX ADMIN — HEPARIN SODIUM 5000 UNIT(S): 5000 INJECTION INTRAVENOUS; SUBCUTANEOUS at 14:22

## 2017-05-20 RX ADMIN — CARVEDILOL PHOSPHATE 3.12 MILLIGRAM(S): 80 CAPSULE, EXTENDED RELEASE ORAL at 18:55

## 2017-05-20 RX ADMIN — LISINOPRIL 2.5 MILLIGRAM(S): 2.5 TABLET ORAL at 06:04

## 2017-05-20 RX ADMIN — LISINOPRIL 5 MILLIGRAM(S): 2.5 TABLET ORAL at 12:02

## 2017-05-20 RX ADMIN — Medication 1 TABLET(S): at 12:03

## 2017-05-20 RX ADMIN — Medication 40 MILLIGRAM(S): at 18:54

## 2017-05-20 RX ADMIN — Medication 40 MILLIGRAM(S): at 06:05

## 2017-05-20 RX ADMIN — Medication 1 MILLIGRAM(S): at 12:02

## 2017-05-20 RX ADMIN — Medication 20 MILLIEQUIVALENT(S): at 12:04

## 2017-05-20 RX ADMIN — CHLORHEXIDINE GLUCONATE 1 APPLICATION(S): 213 SOLUTION TOPICAL at 12:03

## 2017-05-20 RX ADMIN — HEPARIN SODIUM 5000 UNIT(S): 5000 INJECTION INTRAVENOUS; SUBCUTANEOUS at 21:51

## 2017-05-20 RX ADMIN — HEPARIN SODIUM 5000 UNIT(S): 5000 INJECTION INTRAVENOUS; SUBCUTANEOUS at 06:04

## 2017-05-20 NOTE — PROGRESS NOTE ADULT - PROBLEM SELECTOR PLAN 1
-BP is currently well-controlled off of the nitro gtt, remains on lasix 40 PO BID, diuresing well. Currently on lisinopril 2.5 po qd with good control.  -Patient remains tachycardic -Initiated BB, lopressor 25mg po bid, will continue to closely monitor. The long-term goal is to transition patient to a long acting toprol XL at discharge.  -Pt remains with JVD, on lasix 40 PO BID, diuresing well.   -Increased lisinopril 5mg po qd with good control.  -Patient remains tachycardic, will monitor now with the addition of BB

## 2017-05-20 NOTE — PROGRESS NOTE ADULT - ASSESSMENT
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

## 2017-05-20 NOTE — PROGRESS NOTE ADULT - SUBJECTIVE AND OBJECTIVE BOX
Patient reports improvement in her symptoms.  Pt reports some SOB while ambulating, however denies any orthopnea, chest pain, palpitations, HA, dizziness.  +voiding.  Tolerating regular diet.    T(F): 98.6, Max: 98.6 (05-20 @ 04:35)  HR: 105 (93 - 114)  BP: 115/78 (115/78 - 147/85)  RR: 18 (18 - 30)  SpO2: 100% (99% - 100%)  Wt(kg): --  I&O's Summary    I & Os for current day (as of 20 May 2017 07:02)  =============================================  IN: 960 ml / OUT: 2600 ml / NET: -1640 ml      MEDICATIONS  (STANDING):  heparin  Injectable 5000Unit(s) SubCutaneous every 8 hours  lisinopril 2.5milliGRAM(s) Oral daily  furosemide    Tablet 40milliGRAM(s) Oral two times a day  potassium chloride   Powder 20milliEquivalent(s) Oral daily  chlorhexidine 4% Liquid 1Application(s) Topical daily    MEDICATIONS  (PRN):      Physical Exam:  Constitutional: NAD  Pulmonary: clear to auscultation bilaterally   Cardiovascular: Regular rate and rhythm  Extremities: no lower extremity edema or calve tenderness. SCDs in place     LABS:                        11.6   8.83  )-----------( 383      ( 20 May 2017 04:45 )             38.4     05-20    143  |  102  |  15  ----------------------------<  92  3.9   |  24  |  0.91    Ca    9.8      20 May 2017 04:45  Phos  3.7     05-20  Mg     2.1     05-20    TPro  8.4<H>  /  Alb  4.1  /  TBili  0.9  /  DBili  x   /  AST  24  /  ALT  20  /  AlkPhos  89  05-19    PT/INR - ( 19 May 2017 06:00 )   PT: 12.6 SEC;   INR: 1.12          PTT - ( 19 May 2017 06:00 )  PTT:34.3 SEC  Urinalysis Basic - ( 18 May 2017 14:22 )    Color: PLYEL / Appearance: CLEAR / S.014 / pH: 6.0  Gluc: NEGATIVE / Ketone: NEGATIVE  / Bili: NEGATIVE / Urobili: NORMAL E.U.   Blood: NEGATIVE / Protein: 10 / Nitrite: NEGATIVE   Leuk Esterase: NEGATIVE / RBC: 0-2 / WBC 0-2   Sq Epi: OCC / Non Sq Epi: x / Bacteria: x        RADIOLOGY & ADDITIONAL TESTS:

## 2017-05-20 NOTE — PROGRESS NOTE ADULT - PROBLEM SELECTOR PLAN 2
BP improved with lisinopril 2.5 po qd. Labetolol currently on hold BP improving, continue lopressor 25 mg po bid, and lisinopril 5mg po bid, Encourage walking with physical therapy.

## 2017-05-20 NOTE — PROGRESS NOTE ADULT - SUBJECTIVE AND OBJECTIVE BOX
Date of Admission: 5/20/17    24H hour events:   No overnight events    Vital Signs Last 24 Hrs:  T(C): 37, Max: 37 (05-20 @ 04:35)  T(F): 98.6, Max: 98.6 (05-20 @ 04:35)  HR: 105 (93 - 114)  BP: 115/78 (115/78 - 147/85)  BP(mean): 86 (74 - 99)  RR: 18 (18 - 30)  SpO2: 100% (99% - 100%)      I & Os for current day (as of 20 May 2017 07:53)  =============================================  IN: 960 ml / OUT: 2600 ml / NET: -1640 ml      MEDICATIONS:  heparin  Injectable 5000Unit(s) SubCutaneous every 8 hours  lisinopril 2.5milliGRAM(s) Oral daily  furosemide    Tablet 40milliGRAM(s) Oral two times a day  potassium chloride   Powder 20milliEquivalent(s) Oral daily  chlorhexidine 4% Liquid 1Application(s) Topical daily      REVIEW OF SYSTEMS:  Complete 10point ROS negative.    PHYSICAL EXAM:  Appearance: Normal	  HEENT:   Normal oral mucosa, PERRL, EOMI	  Lymphatic: No lymphadenopathy  Cardiovascular: Normal S1 S2, No JVD, No murmurs, No edema  Respiratory: Lungs clear to auscultation	  Psychiatry: A & O x 3, Mood & affect appropriate  Gastrointestinal:  Soft, Non-tender, + BS	  Skin: No rashes, No ecchymoses, No cyanosis	  Neurologic: Non-focal  Extremities: Normal range of motion, No clubbing, cyanosis or edema  Vascular: Peripheral pulses palpable 2+ bilaterally        LABS:	 	    CBC Full  -  ( 20 May 2017 04:45 )  WBC Count : 8.83 K/uL  Hemoglobin : 11.6 g/dL  Hematocrit : 38.4 %  Platelet Count - Automated : 383 K/uL  Mean Cell Volume : 78.5 fL  Mean Cell Hemoglobin : 23.7 pg  Mean Cell Hemoglobin Concentration : 30.2 %  Auto Neutrophil # : x  Auto Lymphocyte # : x  Auto Monocyte # : x  Auto Eosinophil # : x  Auto Basophil # : x  Auto Neutrophil % : x  Auto Lymphocyte % : x  Auto Monocyte % : x  Auto Eosinophil % : x  Auto Basophil % : x    05-20    143  |  102  |  15  ----------------------------<  92  3.9   |  24  |  0.91  05-19    143  |  103  |  13  ----------------------------<  88  3.8   |  21<L>  |  0.88    Ca    9.8      20 May 2017 04:45  Ca    9.6      19 May 2017 06:00  Phos  3.7     05-20  Phos  3.6     05-19  Mg     2.1     05-20  Mg     2.0     05-19    TPro  8.4<H>  /  Alb  4.1  /  TBili  0.9  /  DBili  x   /  AST  24  /  ALT  20  /  AlkPhos  89  05-19  TPro  8.8<H>  /  Alb  4.4  /  TBili  0.4  /  DBili  x   /  AST  21  /  ALT  19  /  AlkPhos  95  05-18      proBNP: Serum Pro-Brain Natriuretic Peptide: 5728 pg/mL (05-18 @ 12:46)    Lipid Profile:   HgA1c:   TSH:       CARDIAC MARKERS:            TELEMETRY: 	sinus tachycardia    ECG:  	  RADIOLOGY:  ECHO:  OTHER: 	    PREVIOUS DIAGNOSTIC TESTING:    [ ] Echocardiogram:  [ ]  Catheterization:  [ ] Stress Test:  	  	  ASSESSMENT/PLAN: 	      Amber Ty NP [id number] Date of Admission: 5/20/17    24H hour events:   No overnight events    Vital Signs Last 24 Hrs:  T(C): 37, Max: 37 (05-20 @ 04:35)  T(F): 98.6, Max: 98.6 (05-20 @ 04:35)  HR: 105 (93 - 114)  BP: 115/78 (115/78 - 147/85)  BP(mean): 86 (74 - 99)  RR: 18 (18 - 30)  SpO2: 100% (99% - 100%)      I & Os for current day (as of 20 May 2017 07:53)  =============================================  IN: 960 ml / OUT: 2600 ml / NET: -1640 ml      MEDICATIONS:  heparin  Injectable 5000Unit(s) SubCutaneous every 8 hours  lisinopril 2.5milliGRAM(s) Oral daily  furosemide    Tablet 40milliGRAM(s) Oral two times a day  potassium chloride   Powder 20milliEquivalent(s) Oral daily  chlorhexidine 4% Liquid 1Application(s) Topical daily      REVIEW OF SYSTEMS:  Complete 10point ROS negative.    PHYSICAL EXAM:  Appearance: Normal	  HEENT:   Normal oral mucosa, PERRL, EOMI	  Lymphatic: No lymphadenopathy  Cardiovascular: Normal S1 S2, No JVD, No murmurs, No edema  Respiratory: Lungs clear to auscultation	  Psychiatry: A & O x 3, Mood & affect appropriate  Gastrointestinal:  Soft, Non-tender, + BS	  Skin: No rashes, No ecchymoses, No cyanosis	  Neurologic: Non-focal  Extremities: Normal range of motion, No clubbing, cyanosis or edema  Vascular: Peripheral pulses palpable 2+ bilaterally    LABS:	 	  CBC Full  -  ( 20 May 2017 04:45 )  WBC Count : 8.83 K/uL  Hemoglobin : 11.6 g/dL  Hematocrit : 38.4 %  Platelet Count - Automated : 383 K/uL  Mean Cell Volume : 78.5 fL  Mean Cell Hemoglobin : 23.7 pg  Mean Cell Hemoglobin Concentration : 30.2 %  Auto Neutrophil # : x  Auto Lymphocyte # : x  Auto Monocyte # : x  Auto Eosinophil # : x  Auto Basophil # : x  Auto Neutrophil % : x  Auto Lymphocyte % : x  Auto Monocyte % : x  Auto Eosinophil % : x  Auto Basophil % : x    05-20    143  |  102  |  15  ----------------------------<  92  3.9   |  24  |  0.91  05-19    143  |  103  |  13  ----------------------------<  88  3.8   |  21<L>  |  0.88    Ca    9.8      20 May 2017 04:45  Ca    9.6      19 May 2017 06:00  Phos  3.7     05-20  Phos  3.6     05-19  Mg     2.1     05-20  Mg     2.0     05-19    TPro  8.4<H>  /  Alb  4.1  /  TBili  0.9  /  DBili  x   /  AST  24  /  ALT  20  /  AlkPhos  89  05-19  TPro  8.8<H>  /  Alb  4.4  /  TBili  0.4  /  DBili  x   /  AST  21  /  ALT  19  /  AlkPhos  95  05-18      proBNP: Serum Pro-Brain Natriuretic Peptide: 5728 pg/mL (05-18 @ 12:46)    TELEMETRY: 	sinus tachycardia Date of Admission: 5/20/17    Subjective:  No overnight events; patient reports being able to lay flat without feeling SOB,  patient remains short of breath when walking with physical therapy.    Vital Signs Last 24 Hrs:  T(C): 37, Max: 37 (05-20 @ 04:35)  T(F): 98.6, Max: 98.6 (05-20 @ 04:35)  HR: 105 (93 - 114)  BP: 115/78 (115/78 - 147/85)  BP(mean): 86 (74 - 99)  RR: 18 (18 - 30)  SpO2: 100% (99% - 100%)      I & Os for current day (as of 20 May 2017 07:53)  =============================================  IN: 960 ml / OUT: 2600 ml / NET: -1640 ml      MEDICATIONS:  heparin  Injectable 5000Unit(s) SubCutaneous every 8 hours  lisinopril 2.5milliGRAM(s) Oral daily  furosemide    Tablet 40milliGRAM(s) Oral two times a day  potassium chloride   Powder 20milliEquivalent(s) Oral daily  chlorhexidine 4% Liquid 1Application(s) Topical daily      REVIEW OF SYSTEMS:  Complete 10point ROS negative.    PHYSICAL EXAM:  Appearance: Normal	  HEENT:   Normal oral mucosa, PERRL, EOMI	  Lymphatic: No lymphadenopathy  Cardiovascular: Normal S1 S2, +JVD to mid-neckline,  No murmurs, No edema  Respiratory: Lungs clear to auscultation	  Psychiatry: A & O x 3, Mood & affect appropriate  Gastrointestinal:  Soft, Non-tender, + BS	  Skin: No rashes, No ecchymoses, No cyanosis	  Neurologic: Non-focal  Extremities: Normal range of motion. Significantly less edema in bilateral lower extremities  Vascular: Peripheral pulses palpable 2+ bilaterally    LABS:	 	  CBC Full  -  ( 20 May 2017 04:45 )  WBC Count : 8.83 K/uL  Hemoglobin : 11.6 g/dL  Hematocrit : 38.4 %  Platelet Count - Automated : 383 K/uL  Mean Cell Volume : 78.5 fL  Mean Cell Hemoglobin : 23.7 pg  Mean Cell Hemoglobin Concentration : 30.2 %  Auto Neutrophil # : x  Auto Lymphocyte # : x  Auto Monocyte # : x  Auto Eosinophil # : x  Auto Basophil # : x  Auto Neutrophil % : x  Auto Lymphocyte % : x  Auto Monocyte % : x  Auto Eosinophil % : x  Auto Basophil % : x    05-20    143  |  102  |  15  ----------------------------<  92  3.9   |  24  |  0.91  05-19    143  |  103  |  13  ----------------------------<  88  3.8   |  21<L>  |  0.88    Ca    9.8      20 May 2017 04:45  Ca    9.6      19 May 2017 06:00  Phos  3.7     05-20  Phos  3.6     05-19  Mg     2.1     05-20  Mg     2.0     05-19    TPro  8.4<H>  /  Alb  4.1  /  TBili  0.9  /  DBili  x   /  AST  24  /  ALT  20  /  AlkPhos  89  05-19  TPro  8.8<H>  /  Alb  4.4  /  TBili  0.4  /  DBili  x   /  AST  21  /  ALT  19  /  AlkPhos  95  05-18      proBNP: Serum Pro-Brain Natriuretic Peptide: 5728 pg/mL (05-18 @ 12:46)    TELEMETRY: 	sinus tachycardia

## 2017-05-20 NOTE — PROGRESS NOTE ADULT - ASSESSMENT
25 y/o  s/p primary c/s on 17 complicated by preclampsia without severe features, admitted with postpartum cardiomyopathy.    1) CV: Postpartum Cardiomyopathy: s/p IV Lasix overnight with improvement in BPs and patient's symptoms.  Currently on lisinopril. Appreciate CCU care.  2) Pulm: O2 sats stable.  3) GI: Regular diet  4) : Monitor I/Os.  Lasix  5) Heme: DVT Prophylaxis: HSQ, venodynes, ambulation as tolerated.

## 2017-05-21 LAB
ALBUMIN SERPL ELPH-MCNC: 4.2 G/DL — SIGNIFICANT CHANGE UP (ref 3.3–5)
ALP SERPL-CCNC: 94 U/L — SIGNIFICANT CHANGE UP (ref 40–120)
ALT FLD-CCNC: 30 U/L — SIGNIFICANT CHANGE UP (ref 4–33)
ANA TITR SER: NEGATIVE — SIGNIFICANT CHANGE UP
AST SERPL-CCNC: 26 U/L — SIGNIFICANT CHANGE UP (ref 4–32)
BILIRUB SERPL-MCNC: 0.8 MG/DL — SIGNIFICANT CHANGE UP (ref 0.2–1.2)
BUN SERPL-MCNC: 19 MG/DL — SIGNIFICANT CHANGE UP (ref 7–23)
CALCIUM SERPL-MCNC: 9.7 MG/DL — SIGNIFICANT CHANGE UP (ref 8.4–10.5)
CHLORIDE SERPL-SCNC: 102 MMOL/L — SIGNIFICANT CHANGE UP (ref 98–107)
CO2 SERPL-SCNC: 22 MMOL/L — SIGNIFICANT CHANGE UP (ref 22–31)
CREAT SERPL-MCNC: 0.91 MG/DL — SIGNIFICANT CHANGE UP (ref 0.5–1.3)
GLUCOSE SERPL-MCNC: 81 MG/DL — SIGNIFICANT CHANGE UP (ref 70–99)
HCT VFR BLD CALC: 39.3 % — SIGNIFICANT CHANGE UP (ref 34.5–45)
HCV AB S/CO SERPL IA: 0.3 S/CO — SIGNIFICANT CHANGE UP
HCV AB SERPL-IMP: SIGNIFICANT CHANGE UP
HGB BLD-MCNC: 12 G/DL — SIGNIFICANT CHANGE UP (ref 11.5–15.5)
HIV1 AG SER QL: SIGNIFICANT CHANGE UP
HIV1+2 AB SPEC QL: SIGNIFICANT CHANGE UP
MAGNESIUM SERPL-MCNC: 2.1 MG/DL — SIGNIFICANT CHANGE UP (ref 1.6–2.6)
MCHC RBC-ENTMCNC: 24 PG — LOW (ref 27–34)
MCHC RBC-ENTMCNC: 30.5 % — LOW (ref 32–36)
MCV RBC AUTO: 78.4 FL — LOW (ref 80–100)
NT-PROBNP SERPL-SCNC: 1304 PG/ML — SIGNIFICANT CHANGE UP
PHOSPHATE SERPL-MCNC: 3.9 MG/DL — SIGNIFICANT CHANGE UP (ref 2.5–4.5)
PLATELET # BLD AUTO: 399 K/UL — SIGNIFICANT CHANGE UP (ref 150–400)
PMV BLD: 10.5 FL — SIGNIFICANT CHANGE UP (ref 7–13)
POTASSIUM SERPL-MCNC: 3.8 MMOL/L — SIGNIFICANT CHANGE UP (ref 3.5–5.3)
POTASSIUM SERPL-SCNC: 3.8 MMOL/L — SIGNIFICANT CHANGE UP (ref 3.5–5.3)
PROT SERPL-MCNC: 8.7 G/DL — HIGH (ref 6–8.3)
PROT SERPL-MCNC: 8.8 G/DL — HIGH (ref 6–8.3)
PROT UR-MCNC: 16.4 MG/DL — SIGNIFICANT CHANGE UP
RBC # BLD: 5.01 M/UL — SIGNIFICANT CHANGE UP (ref 3.8–5.2)
RBC # FLD: 16.7 % — HIGH (ref 10.3–14.5)
SODIUM SERPL-SCNC: 142 MMOL/L — SIGNIFICANT CHANGE UP (ref 135–145)
WBC # BLD: 7.79 K/UL — SIGNIFICANT CHANGE UP (ref 3.8–10.5)
WBC # FLD AUTO: 7.79 K/UL — SIGNIFICANT CHANGE UP (ref 3.8–10.5)

## 2017-05-21 PROCEDURE — 84166 PROTEIN E-PHORESIS/URINE/CSF: CPT | Mod: 26

## 2017-05-21 PROCEDURE — 84165 PROTEIN E-PHORESIS SERUM: CPT | Mod: 26

## 2017-05-21 PROCEDURE — 99233 SBSQ HOSP IP/OBS HIGH 50: CPT

## 2017-05-21 PROCEDURE — 93010 ELECTROCARDIOGRAM REPORT: CPT

## 2017-05-21 RX ORDER — CARVEDILOL PHOSPHATE 80 MG/1
3.12 CAPSULE, EXTENDED RELEASE ORAL EVERY 12 HOURS
Qty: 0 | Refills: 0 | Status: DISCONTINUED | OUTPATIENT
Start: 2017-05-21 | End: 2017-05-21

## 2017-05-21 RX ORDER — FUROSEMIDE 40 MG
40 TABLET ORAL EVERY 12 HOURS
Qty: 0 | Refills: 0 | Status: DISCONTINUED | OUTPATIENT
Start: 2017-05-21 | End: 2017-05-23

## 2017-05-21 RX ORDER — FUROSEMIDE 40 MG
40 TABLET ORAL ONCE
Qty: 0 | Refills: 0 | Status: COMPLETED | OUTPATIENT
Start: 2017-05-21 | End: 2017-05-21

## 2017-05-21 RX ORDER — FUROSEMIDE 40 MG
40 TABLET ORAL DAILY
Qty: 0 | Refills: 0 | Status: DISCONTINUED | OUTPATIENT
Start: 2017-05-21 | End: 2017-05-21

## 2017-05-21 RX ORDER — SPIRONOLACTONE 25 MG/1
12.5 TABLET, FILM COATED ORAL DAILY
Qty: 0 | Refills: 0 | Status: DISCONTINUED | OUTPATIENT
Start: 2017-05-21 | End: 2017-05-23

## 2017-05-21 RX ORDER — CARVEDILOL PHOSPHATE 80 MG/1
6.25 CAPSULE, EXTENDED RELEASE ORAL EVERY 12 HOURS
Qty: 0 | Refills: 0 | Status: DISCONTINUED | OUTPATIENT
Start: 2017-05-21 | End: 2017-05-21

## 2017-05-21 RX ADMIN — CHLORHEXIDINE GLUCONATE 1 APPLICATION(S): 213 SOLUTION TOPICAL at 12:14

## 2017-05-21 RX ADMIN — SPIRONOLACTONE 12.5 MILLIGRAM(S): 25 TABLET, FILM COATED ORAL at 12:13

## 2017-05-21 RX ADMIN — HEPARIN SODIUM 5000 UNIT(S): 5000 INJECTION INTRAVENOUS; SUBCUTANEOUS at 05:58

## 2017-05-21 RX ADMIN — Medication 1 TABLET(S): at 12:14

## 2017-05-21 RX ADMIN — HEPARIN SODIUM 5000 UNIT(S): 5000 INJECTION INTRAVENOUS; SUBCUTANEOUS at 21:26

## 2017-05-21 RX ADMIN — Medication 40 MILLIGRAM(S): at 12:14

## 2017-05-21 RX ADMIN — Medication 40 MILLIGRAM(S): at 21:26

## 2017-05-21 RX ADMIN — LISINOPRIL 5 MILLIGRAM(S): 2.5 TABLET ORAL at 05:58

## 2017-05-21 RX ADMIN — HEPARIN SODIUM 5000 UNIT(S): 5000 INJECTION INTRAVENOUS; SUBCUTANEOUS at 15:08

## 2017-05-21 RX ADMIN — Medication 40 MILLIGRAM(S): at 05:58

## 2017-05-21 RX ADMIN — CARVEDILOL PHOSPHATE 3.12 MILLIGRAM(S): 80 CAPSULE, EXTENDED RELEASE ORAL at 05:58

## 2017-05-21 RX ADMIN — Medication 1 MILLIGRAM(S): at 12:14

## 2017-05-21 NOTE — PROGRESS NOTE ADULT - PROBLEM SELECTOR PLAN 1
--patient p/w symptoms concerning for CHF with dyspnea on exertion, orthopnea and palpitations in the setting of recently delivery for pre-eclampsia, a statistically significant risk factor for PPCM  --evidence on TTE of severe global systolic LV dysfunction with EF 24%, also mild RV systolic dysfunction concerning for right-sided HF 2/2 progressive left-sided HF. Severe pulmonary HTN likely class II 2/2 CHF.  --unlikely ischemic CM given presenting EKG and negative CE on admission  --symptoms and pulmonary exam improved s/p Lasix diuresis and normalization of BPs, though remains symptomatic   --currently treating CHF with Coreg 3.125mg BID, Lisinopril 5mg qd, Lasix 40mg PO BID. Patient is not breastfeeding, does not need to be considered in pharmacotherapy   --consider uptitrating Coreg to 6.25mg BID today as patient remains tachycardic  --prognosis unclear, patient will need close cardiologist follow up after discharge to continually assess cardiac function.   --goal diuresis net (-) 1-1.5L  --ob/gyn on board, appreciate recs. --patient p/w symptoms concerning for CHF with dyspnea on exertion, orthopnea and palpitations in the setting of recently delivery for pre-eclampsia, a statistically significant risk factor for PPCM  --evidence on TTE of severe global systolic LV dysfunction with EF 24%, also mild RV systolic dysfunction concerning for right-sided HF 2/2 progressive left-sided HF. Severe pulmonary HTN likely class II 2/2 CHF.  --unlikely ischemic CM given presenting EKG and negative CE on admission  --symptoms and pulmonary exam improved s/p Lasix diuresis and normalization of BPs, though remains symptomatic   --currently treating CHF with Coreg 3.125mg BID, Lisinopril 5mg qd, Lasix 40mg PO BID. Patient is not breastfeeding, does not need to be considered in pharmacotherapy   --consider uptitrating Coreg to 6.25mg BID today as patient remains tachycardic  --add aldactone 25mg qd   --prognosis unclear, patient will need close cardiologist follow up after discharge to continually assess cardiac function.   --goal diuresis net (-) 1-1.5L  --ob/gyn on board, appreciate recs. --patient p/w symptoms concerning for CHF with dyspnea on exertion, orthopnea and palpitations in the setting of recently delivery for pre-eclampsia, a statistically significant risk factor for PPCM  --evidence on TTE of severe global systolic LV dysfunction with EF 24%, also mild RV systolic dysfunction concerning for right-sided HF 2/2 progressive left-sided HF. Severe pulmonary HTN likely class II 2/2 CHF.  --unlikely ischemic CM given presenting EKG and negative CE on admission  --symptoms and pulmonary exam improved s/p Lasix diuresis and normalization of BPs, though remains symptomatic   --currently treating CHF with Coreg 3.125mg BID, Lisinopril 5mg qd, Lasix 40mg PO BID. Patient is not breastfeeding, does not need to be considered in pharmacotherapy   --consider uptitrating Coreg to 6.25mg BID today as patient remains tachycardic  --add aldactone 12.5mg qd, d/c potassium supplement  --prognosis unclear, patient will need close cardiologist follow up after discharge to continually assess cardiac function.   --goal diuresis net (-) 1-1.5L  --ob/gyn on board, appreciate recs.

## 2017-05-21 NOTE — PROGRESS NOTE ADULT - ASSESSMENT
27 y/o  s/p primary c/s on 17 complicated by preclampsia without severe features, admitted with postpartum cardiomyopathy. HD#3

## 2017-05-21 NOTE — PROGRESS NOTE ADULT - PROBLEM SELECTOR PLAN 3
--initially resolved, now some recurrence of chest tightness. Does not appear ischemic  --may be related to pulmonary edema, physical exam limited   --if chest pain recurs, will obtain second set of cardiac enzymes and repeat 12-lead EKG --initially resolved, now some recurrence of chest tightness. Does not appear ischemic  --may be related to pulmonary edema, pulmonary exam limited 2/2 body habitus so will consider obtaining CXR today to assess   --if chest pain recurs, will obtain second set of cardiac enzymes and repeat 12-lead EKG --initially resolved, now some recurrence of chest tightness. Does not appear ischemic  --may be related to pulmonary edema, pulmonary exam limited 2/2 body habitus so will consider obtaining CXR today to assess   --will obtain repeat 12-lead EKG

## 2017-05-21 NOTE — DIETITIAN INITIAL EVALUATION ADULT. - NS AS NUTRI INTERV MEALS SNACK
1. Suggest: PO diet rx: Regular, DASH/TLC (cholesterol and sodium restricted), Low Fat;              2. Monitor PO diet tolerance;               3. Monitor labs, weights, hydration status;               4. Recommend: to follow up with weight management dietitians;/Diets modified for specific foods and ingredients/Other (specify)

## 2017-05-21 NOTE — PROGRESS NOTE ADULT - SUBJECTIVE AND OBJECTIVE BOX
CCU Progress Note    S/24 Events: No acute events overnight. Patient reports compared to admission she is less SOB but continues to have GONZALEZ when changing positions in bed and on ambulation, also experiences palpitations concomitantly. Some 2/10 chest "tightness" today, non-radiation and not always exertional. No N/V, diaphoresis.     O:  T(C): 36.7, Max: 36.9 ( @ 08:16)  HR: 99 (86 - 124)  BP: 124/69 (109/65 - 149/64)  RR: 16 (16 - 18)  SpO2: 100% (96% - 100%)  Wt(kg): --  Daily     Daily Weight in k.9 (21 May 2017 05:46)    I & Os for current day (as of  @ 07:58)  =============================================  IN: 600 ml / OUT: 2150 ml / NET: -1550 ml      Gen: NAD  HEENT: EOMI  CV: RRR, normal S1 + S2, no m/r/g  Lungs: CTAB  Abd: soft, non-tender  Ext: No edema    Labs:                        12.0   7.79  )-----------( 399      ( 21 May 2017 06:00 )             39.3         143  |  102  |  15  ----------------------------<  92  3.9   |  24  |  0.91    Ca    9.8      20 May 2017 04:45  Phos  3.7       Mg     2.1         Echocardiogram:   EF 24%  1. Normal mitral valve. Mild-moderate mitral regurgitation.  2. Moderately dilated left atrium.  LA volume index = 43  cc/m2.  3. Mild left ventricular enlargement.  4. Severe global left ventricular systolic dysfunction.  5. Normal right ventricular size with decreased right  ventricular systolic function.  6. Estimated right ventricular systolic pressure equals 61  mm Hg, assuming right atrial pressure equals 10 mm Hg,  consistent with severe pulmonary hypertension.  *** No previous Echo exam.      Meds:  MEDICATIONS  (STANDING):  heparin  Injectable 5000Unit(s) SubCutaneous every 8 hours  potassium chloride   Powder 20milliEquivalent(s) Oral daily  chlorhexidine 4% Liquid 1Application(s) Topical daily  lisinopril 5milliGRAM(s) Oral daily  furosemide    Tablet 40milliGRAM(s) Oral two times a day  multivitamin 1Tablet(s) Oral daily  folic acid 1milliGRAM(s) Oral daily  carvedilol 3.125milliGRAM(s) Oral every 12 hours      A/P:        Satnam Fraire, PGY-5  Cardiology CCU Progress Note    S/24 Events: No acute events overnight. Patient reports compared to admission she is less SOB but continues to have GONZALEZ when changing positions in bed and on ambulation, also experiences palpitations concomitantly. Some 2/10 chest "tightness" today, non-radiation and not always exertional. No N/V, diaphoresis.     O:  T(C): 36.7, Max: 36.9 ( @ 08:16)  HR: 99 (86 - 124)  BP: 124/69 (109/65 - 149/64)  RR: 16 (16 - 18)  SpO2: 100% (96% - 100%)  Wt(kg): --  Daily     Daily Weight in k.9 (21 May 2017 05:46)    I & Os for current day (as of  @ 07:58)  =============================================  IN: 600 ml / OUT: 2150 ml / NET: -1550 ml      Gen: alert, NAD, pleasasnt  HEENT: clear conjunctiva, MMM  CV: tachycardic, normal S1 + S2, no m/r/g  Lungs: CTAB grossly, breath sounds difficult to appreciate 2/2 body habitus   Abd: soft, non-tender, non-distended, NABS  Ext: No peripheral edema    Labs:                        12.0   7.79  )-----------( 399      ( 21 May 2017 06:00 )             39.3     05-20    143  |  102  |  15  ----------------------------<  92  3.9   |  24  |  0.91    Ca    9.8      20 May 2017 04:45  Phos  3.7     05-20  Mg     2.1     05-20    Echocardiogram:   EF 24%  1. Normal mitral valve. Mild-moderate mitral regurgitation.  2. Moderately dilated left atrium.  LA volume index = 43  cc/m2.  3. Mild left ventricular enlargement.  4. Severe global left ventricular systolic dysfunction.  5. Normal right ventricular size with decreased right  ventricular systolic function.  6. Estimated right ventricular systolic pressure equals 61  mm Hg, assuming right atrial pressure equals 10 mm Hg,  consistent with severe pulmonary hypertension.  *** No previous Echo exam.      Meds:  MEDICATIONS  (STANDING):  heparin  Injectable 5000Unit(s) SubCutaneous every 8 hours  potassium chloride   Powder 20milliEquivalent(s) Oral daily  chlorhexidine 4% Liquid 1Application(s) Topical daily  lisinopril 5milliGRAM(s) Oral daily  furosemide    Tablet 40milliGRAM(s) Oral two times a day  multivitamin 1Tablet(s) Oral daily  folic acid 1milliGRAM(s) Oral daily  carvedilol 3.125milliGRAM(s) Oral every 12 hours      A/P:        Satnam Fraire, PGY-5  Cardiology CCU Progress Note    S/24 Events: No acute events overnight. Patient reports compared to admission she is less SOB but continues to have GONZALEZ when changing positions in bed and on ambulation, also experiences palpitations concomitantly. Some 2/10 chest "tightness" today, non-radiation and not always exertional. No N/V, diaphoresis.     O:  T(C): 36.7, Max: 36.9 ( @ 08:16)  HR: 99 (86 - 124)  BP: 124/69 (109/65 - 149/64)  RR: 16 (16 - 18)  SpO2: 100% (96% - 100%)  Wt(kg): --  Daily     Daily Weight in k.9 (21 May 2017 05:46)    I & Os for current day (as of  @ 07:58)  =============================================  IN: 600 ml / OUT: 2150 ml / NET: -1550 ml      Gen: alert, NAD, pleasasnt  HEENT: clear conjunctiva, MMM  CV: tachycardic, normal S1 + S2, no m/r/g  Lungs: CTAB grossly, breath sounds difficult to appreciate 2/2 body habitus   Abd: soft, non-tender, non-distended, NABS  Ext: No peripheral edema    Labs:                        12.0   7.79  )-----------( 399      ( 21 May 2017 06:00 )             39.3     05-20    143  |  102  |  15  ----------------------------<  92  3.9   |  24  |  0.91    Ca    9.8      20 May 2017 04:45  Phos  3.7     05-20  Mg     2.1     05-20    Echocardiogram:   EF 24%  1. Normal mitral valve. Mild-moderate mitral regurgitation.  2. Moderately dilated left atrium.  LA volume index = 43  cc/m2.  3. Mild left ventricular enlargement.  4. Severe global left ventricular systolic dysfunction.  5. Normal right ventricular size with decreased right  ventricular systolic function.  6. Estimated right ventricular systolic pressure equals 61  mm Hg, assuming right atrial pressure equals 10 mm Hg,  consistent with severe pulmonary hypertension.  *** No previous Echo exam.      Meds:  MEDICATIONS  (STANDING):  heparin  Injectable 5000Unit(s) SubCutaneous every 8 hours  potassium chloride   Powder 20milliEquivalent(s) Oral daily  chlorhexidine 4% Liquid 1Application(s) Topical daily  lisinopril 5milliGRAM(s) Oral daily  furosemide    Tablet 40milliGRAM(s) Oral two times a day  multivitamin 1Tablet(s) Oral daily  folic acid 1milliGRAM(s) Oral daily  carvedilol 3.125milliGRAM(s) Oral every 12 hours      Korin Land M.D. PGY-1

## 2017-05-21 NOTE — PROGRESS NOTE ADULT - PROBLEM SELECTOR PLAN 2
--patient with history of post partum HTN on outpatient labetalol - unclear if dx truly PPHTN vs HTN as manifestation of developing PPCM  --presented with hypertensive urgency, max pressures 190s/120s with evidence of pulmonary edema on CXR, though less likely end-organ damage 2/2 HTN and more likely related to systolic dysfunction  --s/p nitro gtt on admission to CCU now d/c'd, pressures have remained normal on PO meds alone

## 2017-05-21 NOTE — DIETITIAN INITIAL EVALUATION ADULT. - OTHER INFO
Nutrition Consult X Registered Dietitian; BMI > 40. Pt 25 yo female appears alert, oriented. Per Pt her appetite good. No chew/swallow problem voiced; no nausea/vomiting/diarrhea reported @ present. Of note Pt recently had  (~2 months ago - per Pt). Pt gained ~25# during pregnancy reported. Pt also stated her weight: ~234# (PTA). Weight management nutrition therapy discussed with Pt including better food choices, foods to avoid. Written materials on diet also provided. RDN remains available, Pt made aware.

## 2017-05-21 NOTE — PROGRESS NOTE ADULT - ASSESSMENT
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

## 2017-05-21 NOTE — PROGRESS NOTE ADULT - ATTENDING COMMENTS
Pt seen and evaluated. Agree with above with following changes;    The patient is still significantly volume overloaded - had orthopnea overnight and GONZALEZ. HR and BP are better, but this may be artificial from the Coreg and even detrimental given the subtle worsening of symptoms. Change Lasix to 40 mg BID IV and d/c coreg for now. Will ask CHF team to evaluate tomorrow but I suspect a right heart cath would reveal significantly elevated filling pressures.

## 2017-05-21 NOTE — PROGRESS NOTE ADULT - SUBJECTIVE AND OBJECTIVE BOX
Ob R3  HD# 3    Patient seen and examined at bedside, no acute overnight events. No acute complaints, pain well controlled. Patient is ambulating, passing flatus, voiding spontaneously, and tolerating regular diet. Denies CP, N/V, fevers, and chills. States that she still feels chest pressure when laying down intermittently, which improves when she is sitting up. Ambulating as tolerated, states that she feels improvement since admission    Vital Signs Last 24 Hours  T(C): 36.7, Max: 36.9 (05-20 @ 08:16)  HR: 99 (86 - 124)  BP: 124/69 (109/65 - 149/64)  RR: 16 (16 - 18)  SpO2: 100% (96% - 100%)    I&O's Summary    I & Os for current day (as of 21 May 2017 07:04)  =============================================  IN: 600 ml / OUT: 2150 ml / NET: -1550 ml      Physical Exam:  General: NAD  CV: NR, RR, S1, S2, no M/R/G  Lungs: CTA-B  Abdomen: Soft, non-tender, non-distended, +BS  Incision: CDI  Ext: No pain or swelling    Labs:                        11.6   8.83  )-----------( 383      ( 20 May 2017 04:45 )             38.4                         11.1   8.10  )-----------( 376      ( 19 May 2017 06:00 )             35.9                      10.9   8.57  )-----------( 387      ( 18 May 2017 14:00 )             36.3     MEDICATIONS  (STANDING):  heparin  Injectable 5000Unit(s) SubCutaneous every 8 hours  potassium chloride   Powder 20milliEquivalent(s) Oral daily  chlorhexidine 4% Liquid 1Application(s) Topical daily  lisinopril 5milliGRAM(s) Oral daily  furosemide    Tablet 40milliGRAM(s) Oral two times a day  multivitamin 1Tablet(s) Oral daily  folic acid 1milliGRAM(s) Oral daily  carvedilol 3.125milliGRAM(s) Oral every 12 hours

## 2017-05-21 NOTE — PROGRESS NOTE ADULT - PROBLEM SELECTOR PLAN 1
1) CV: Postpartum Cardiomyopathy: patient reports improving of sx, cont care per CCU  Currently on  Coreg, Lasix, Lisinopril. Appreciate CCU care.  2) Pulm: O2 sats stable on room air. ambulate as tolerated  3) GI: Regular diet  4) : Monitor I/Os.  5) Heme: DVT Prophylaxis: HSQ, SCD, ambulation as tolerated.

## 2017-05-21 NOTE — PROGRESS NOTE ADULT - SUBJECTIVE AND OBJECTIVE BOX
R3 OB Overnight Note    S: Patient seen and examined at bedside, no acute events, states she is feeling better, denies CP, SOB, N, V. Patient states her sx are improving.. Denies HA, dizziness, tolerating regular diet    O: VS 36.8  HR 86  .65 (118-121/41-65)  Gen: awake, alert, NAD  CV: [id number]  Resp: CTAbilat  Abd: soft, NT, ND  inc: c/d/o   Ext: WWP, NTBL    Meds: HSQ, Carvedilol, Lasix 40mgBID, Zestril,     27 y/o  s/p primary c/s on 17 complicated by preclampsia without severe features, admitted with postpartum cardiomyopathy.    1) CV: Postpartum Cardiomyopathy: patient reports improving of sx, cont care per CCU  Currently on lisinopril. Appreciate CCU care.  2) Pulm: O2 sats stable.  3) GI: Regular diet  4) : Monitor I/Os.  Lasix  5) Heme: DVT Prophylaxis: HSQ, SCD, ambulation as tolerated.     [id number]

## 2017-05-21 NOTE — DIETITIAN INITIAL EVALUATION ADULT. - ENERGY NEEDS
Pt's height: 66"              IBW: ~130#+/-1%  Pt's weight (PTA): 234# (per Pt)             BMI: 37.76 Kg/m2

## 2017-05-22 LAB
BUN SERPL-MCNC: 20 MG/DL — SIGNIFICANT CHANGE UP (ref 7–23)
CALCIUM SERPL-MCNC: 10 MG/DL — SIGNIFICANT CHANGE UP (ref 8.4–10.5)
CHLORIDE SERPL-SCNC: 99 MMOL/L — SIGNIFICANT CHANGE UP (ref 98–107)
CO2 SERPL-SCNC: 24 MMOL/L — SIGNIFICANT CHANGE UP (ref 22–31)
CREAT SERPL-MCNC: 0.86 MG/DL — SIGNIFICANT CHANGE UP (ref 0.5–1.3)
GLUCOSE SERPL-MCNC: 93 MG/DL — SIGNIFICANT CHANGE UP (ref 70–99)
HCG UR-SCNC: NEGATIVE — SIGNIFICANT CHANGE UP
HCT VFR BLD CALC: 41.4 % — SIGNIFICANT CHANGE UP (ref 34.5–45)
HGB BLD-MCNC: 12.8 G/DL — SIGNIFICANT CHANGE UP (ref 11.5–15.5)
MAGNESIUM SERPL-MCNC: 2.1 MG/DL — SIGNIFICANT CHANGE UP (ref 1.6–2.6)
MCHC RBC-ENTMCNC: 24.3 PG — LOW (ref 27–34)
MCHC RBC-ENTMCNC: 30.9 % — LOW (ref 32–36)
MCV RBC AUTO: 78.7 FL — LOW (ref 80–100)
PHOSPHATE SERPL-MCNC: 4.1 MG/DL — SIGNIFICANT CHANGE UP (ref 2.5–4.5)
PLATELET # BLD AUTO: 383 K/UL — SIGNIFICANT CHANGE UP (ref 150–400)
PMV BLD: 10.1 FL — SIGNIFICANT CHANGE UP (ref 7–13)
POTASSIUM SERPL-MCNC: 3.9 MMOL/L — SIGNIFICANT CHANGE UP (ref 3.5–5.3)
POTASSIUM SERPL-SCNC: 3.9 MMOL/L — SIGNIFICANT CHANGE UP (ref 3.5–5.3)
RBC # BLD: 5.26 M/UL — HIGH (ref 3.8–5.2)
RBC # FLD: 16.5 % — HIGH (ref 10.3–14.5)
SODIUM SERPL-SCNC: 142 MMOL/L — SIGNIFICANT CHANGE UP (ref 135–145)
SP GR UR: 1.01 — SIGNIFICANT CHANGE UP (ref 1–1.03)
WBC # BLD: 8.07 K/UL — SIGNIFICANT CHANGE UP (ref 3.8–10.5)
WBC # FLD AUTO: 8.07 K/UL — SIGNIFICANT CHANGE UP (ref 3.8–10.5)

## 2017-05-22 PROCEDURE — 99233 SBSQ HOSP IP/OBS HIGH 50: CPT

## 2017-05-22 RX ORDER — DIGOXIN 250 MCG
0.12 TABLET ORAL DAILY
Qty: 0 | Refills: 0 | Status: DISCONTINUED | OUTPATIENT
Start: 2017-05-24 | End: 2017-05-28

## 2017-05-22 RX ORDER — DOCUSATE SODIUM 100 MG
100 CAPSULE ORAL THREE TIMES A DAY
Qty: 0 | Refills: 0 | Status: DISCONTINUED | OUTPATIENT
Start: 2017-05-22 | End: 2017-05-28

## 2017-05-22 RX ORDER — SENNA PLUS 8.6 MG/1
2 TABLET ORAL AT BEDTIME
Qty: 0 | Refills: 0 | Status: DISCONTINUED | OUTPATIENT
Start: 2017-05-22 | End: 2017-05-28

## 2017-05-22 RX ORDER — CARVEDILOL PHOSPHATE 80 MG/1
6.25 CAPSULE, EXTENDED RELEASE ORAL EVERY 12 HOURS
Qty: 0 | Refills: 0 | Status: DISCONTINUED | OUTPATIENT
Start: 2017-05-22 | End: 2017-05-22

## 2017-05-22 RX ORDER — DIGOXIN 250 MCG
0.5 TABLET ORAL ONCE
Qty: 0 | Refills: 0 | Status: COMPLETED | OUTPATIENT
Start: 2017-05-22 | End: 2017-05-22

## 2017-05-22 RX ORDER — LISINOPRIL 2.5 MG/1
5 TABLET ORAL ONCE
Qty: 0 | Refills: 0 | Status: DISCONTINUED | OUTPATIENT
Start: 2017-05-22 | End: 2017-05-22

## 2017-05-22 RX ORDER — CAPTOPRIL 12.5 MG/1
12.5 TABLET ORAL EVERY 8 HOURS
Qty: 0 | Refills: 0 | Status: DISCONTINUED | OUTPATIENT
Start: 2017-05-23 | End: 2017-05-23

## 2017-05-22 RX ORDER — CAPTOPRIL 12.5 MG/1
12.5 TABLET ORAL ONCE
Qty: 0 | Refills: 0 | Status: COMPLETED | OUTPATIENT
Start: 2017-05-22 | End: 2017-05-22

## 2017-05-22 RX ORDER — CARVEDILOL PHOSPHATE 80 MG/1
6.25 CAPSULE, EXTENDED RELEASE ORAL EVERY 12 HOURS
Qty: 0 | Refills: 0 | Status: DISCONTINUED | OUTPATIENT
Start: 2017-05-22 | End: 2017-05-23

## 2017-05-22 RX ORDER — DIGOXIN 250 MCG
0.25 TABLET ORAL ONCE
Qty: 0 | Refills: 0 | Status: DISCONTINUED | OUTPATIENT
Start: 2017-05-23 | End: 2017-05-23

## 2017-05-22 RX ADMIN — Medication 100 MILLIGRAM(S): at 13:24

## 2017-05-22 RX ADMIN — Medication 100 MILLIGRAM(S): at 21:07

## 2017-05-22 RX ADMIN — CARVEDILOL PHOSPHATE 6.25 MILLIGRAM(S): 80 CAPSULE, EXTENDED RELEASE ORAL at 20:08

## 2017-05-22 RX ADMIN — HEPARIN SODIUM 5000 UNIT(S): 5000 INJECTION INTRAVENOUS; SUBCUTANEOUS at 06:08

## 2017-05-22 RX ADMIN — HEPARIN SODIUM 5000 UNIT(S): 5000 INJECTION INTRAVENOUS; SUBCUTANEOUS at 13:24

## 2017-05-22 RX ADMIN — Medication 0.5 MILLIGRAM(S): at 16:46

## 2017-05-22 RX ADMIN — Medication 40 MILLIGRAM(S): at 18:25

## 2017-05-22 RX ADMIN — LISINOPRIL 5 MILLIGRAM(S): 2.5 TABLET ORAL at 06:07

## 2017-05-22 RX ADMIN — Medication 40 MILLIGRAM(S): at 06:08

## 2017-05-22 RX ADMIN — CAPTOPRIL 12.5 MILLIGRAM(S): 12.5 TABLET ORAL at 22:28

## 2017-05-22 RX ADMIN — Medication 1 TABLET(S): at 11:46

## 2017-05-22 RX ADMIN — Medication 1 MILLIGRAM(S): at 11:46

## 2017-05-22 RX ADMIN — HEPARIN SODIUM 5000 UNIT(S): 5000 INJECTION INTRAVENOUS; SUBCUTANEOUS at 21:06

## 2017-05-22 RX ADMIN — SPIRONOLACTONE 12.5 MILLIGRAM(S): 25 TABLET, FILM COATED ORAL at 06:07

## 2017-05-22 NOTE — PROGRESS NOTE ADULT - PROBLEM SELECTOR PLAN 3
--initially resolved, now some recurrence of chest tightness. Does not appear ischemic  --may be related to pulmonary edema, pulmonary exam limited 2/2 body habitus so will consider obtaining CXR today to assess   --will obtain repeat 12-lead EKG --initially resolved, now some recurrence of chest tightness/pressure. Does not appear ischemic  --may be related to pulmonary edema, pulmonary exam limited 2/2 body habitus so will consider obtaining CXR today to assess   --will obtain repeat 12-lead EKG

## 2017-05-22 NOTE — PROGRESS NOTE ADULT - ASSESSMENT
27 y/o  s/p primary c/s (17) complicated by preeclampsia without severe features, readmitted with postpartum cardiomyopathy. Stable.  (HD#4)

## 2017-05-22 NOTE — PROGRESS NOTE ADULT - PROBLEM SELECTOR PLAN 1
1) Appreciate CCU Care  2) CV: hemodynamically stable. BPs well controlled.  On Lisinopril 5mg daily and Lasix.  3) Pulm: O2 sats stable on room air.  4) GI: Regular diet  5) : voiding spontaneously.  Monitor I/Os.  6) Heme: DVT Prophylaxis: HSQ 5000U q 8 hours, venodynes, ambulation.

## 2017-05-22 NOTE — PROGRESS NOTE ADULT - PROBLEM SELECTOR PLAN 1
--patient p/w symptoms concerning for CHF with dyspnea on exertion, orthopnea and palpitations in the setting of recently delivery for pre-eclampsia, a statistically significant risk factor for PPCM  --evidence on TTE of severe global systolic LV dysfunction with EF 24%, also mild RV systolic dysfunction concerning for right-sided HF 2/2 progressive left-sided HF. Severe pulmonary HTN likely class II 2/2 CHF.  --unlikely ischemic CM given presenting EKG and negative CE on admission  --symptoms and pulmonary exam improved s/p Lasix diuresis and normalization of BPs, though remains symptomatic   --currently treating CHF with Coreg 3.125mg BID, Lisinopril 5mg qd, Lasix 40mg PO BID. Patient is not breastfeeding, does not need to be considered in pharmacotherapy   --consider uptitrating Coreg to 6.25mg BID today as patient remains tachycardic  --add aldactone 12.5mg qd, d/c potassium supplement  --prognosis unclear, patient will need close cardiologist follow up after discharge to continually assess cardiac function.   --goal diuresis net (-) 1-1.5L  --ob/gyn on board, appreciate recs. --patient p/w symptoms concerning for CHF with dyspnea on exertion, orthopnea and palpitations in the setting of recently delivery for pre-eclampsia, a statistically significant risk factor for PPCM  --evidence on TTE of severe global systolic LV dysfunction with EF 24%, also mild RV systolic dysfunction concerning for right-sided HF 2/2 progressive left-sided HF. Severe pulmonary HTN likely class II 2/2 CHF.  --unlikely ischemic CM given presenting EKG and negative CE on admission  --symptoms and pulmonary exam improved s/p Lasix diuresis and normalization of BPs, though remains symptomatic  - coreg d/c'd due to possibility of still in heart failure   --currently treating CHF Lisinopril 5mg qd, Lasix 40mg IV BID (changed from PO to increase diuresis), Aldactone 12.5 added.  Patient is not breastfeeding, does not need to be considered in pharmacotherapy   --considering re-adding a beta-blocker to decrease heart rate  --prognosis unclear, patient will need close cardiologist follow up after discharge to continually assess cardiac function.   --goal diuresis net (-) 1-1.5L, diuresed 2.1L out  --ob/gyn on board, appreciate recs. --patient p/w symptoms concerning for CHF with dyspnea on exertion, orthopnea and palpitations in the setting of recently delivery for pre-eclampsia, a statistically significant risk factor for PPCM  --evidence on TTE of severe global systolic LV dysfunction with EF 24%, also mild RV systolic dysfunction concerning for right-sided HF 2/2 progressive left-sided HF. Severe pulmonary HTN likely class II 2/2 CHF.  --unlikely ischemic CM given presenting EKG and negative CE on admission  --symptoms and pulmonary exam improved s/p Lasix diuresis and normalization of BPs, though remains symptomatic  - coreg d/c'd due to possibility of still in heart failure   --currently treating CHF Lisinopril 5mg qd, Lasix 40mg IV BID (changed from PO to increase diuresis), Aldactone 12.5 added.  Patient is not breastfeeding, does not need to be considered in pharmacotherapy  --prognosis unclear, patient will need close cardiologist follow up after discharge to continually assess cardiac function.   --goal diuresis net (-) 1-1.5L, diuresed 2.1L out  --ob/gyn on board, appreciate recs.  - heart failure c/s, will do R heart cath to assess fluid status

## 2017-05-22 NOTE — CONSULT NOTE ADULT - SUBJECTIVE AND OBJECTIVE BOX
Date of Admission: 5/18/2017    CHIEF COMPLAINT: increasing SOB and GONZALEZ    HISTORY OF PRESENT ILLNESS:  26F with gestational HTN, treated with labetalol now s/p urgent C section on 4/11/17 for preeclampsia. Patient continued post C section with SOB and tachycardia which briefly improved and then worsened few days preadmission. ECHO revealed EF 24% with severe global LV dysfunction. Admitted to CCU for diuresis and BP control. BP improved but patient remains SOB with exertion and tachycardic. HF consulted for RHC and further management.    Allergies    No Known Allergies    Intolerances    	    MEDICATIONS:  heparin  Injectable 5000Unit(s) SubCutaneous every 8 hours  lisinopril 5milliGRAM(s) Oral daily  spironolactone 12.5milliGRAM(s) Oral daily  furosemide   Injectable 40milliGRAM(s) IV Push every 12 hours    senna 2Tablet(s) Oral at bedtime  docusate sodium 100milliGRAM(s) Oral three times a day      chlorhexidine 4% Liquid 1Application(s) Topical daily  multivitamin 1Tablet(s) Oral daily  folic acid 1milliGRAM(s) Oral daily      PAST MEDICAL & SURGICAL HISTORY:  Pre-eclampsia complicating hypertension  Pregnancy induced hypertension, antepartum  No pertinent past medical history  No significant past surgical history      FAMILY HISTORY:  No pertinent family history in first degree relatives      SOCIAL HISTORY:  denies smoking or ETOH history      REVIEW OF SYSTEMS:  See HPI. Otherwise, 10 point ROS done and otherwise negative.    PHYSICAL EXAM:  T(C): 36.6, Max: 36.8 (05-21 @ 20:00)  HR: 107 (88 - 107)  BP: 132/58 (90/52 - 132/58)  RR: 20 (16 - 20)  SpO2: 99% (97% - 100%)  Wt(kg): --  I&O's Summary  I & Os for 24h ending 22 May 2017 07:00  =============================================  IN: 700 ml / OUT: 2850 ml / NET: -2150 ml    I & Os for current day (as of 22 May 2017 11:40)  =============================================  IN: 200 ml / OUT: 0 ml / NET: 200 ml      Appearance: Normal	  HEENT:   Normal oral mucosa, PERRL, EOMI	  Lymphatic: No lymphadenopathy  Cardiovascular: Normal S1 S2, No JVD, No murmurs, No edema  Respiratory: few crackles @ bases  Psychiatry: A & O x 3, Mood & affect appropriate  Gastrointestinal:  Soft, Non-tender, + BS	  Skin: No rashes, No ecchymoses, No cyanosis	  Neurologic: Non-focal  Extremities: Normal range of motion, No clubbing, cyanosis or edema  Vascular: Peripheral pulses palpable 2+ bilaterally        LABS:	 	    CBC Full  -  ( 22 May 2017 05:35 )  WBC Count : 8.07 K/uL  Hemoglobin : 12.8 g/dL  Hematocrit : 41.4 %  Platelet Count - Automated : 383 K/uL  Mean Cell Volume : 78.7 fL  Mean Cell Hemoglobin : 24.3 pg  Mean Cell Hemoglobin Concentration : 30.9 %  Auto Neutrophil # : x  Auto Lymphocyte # : x  Auto Monocyte # : x  Auto Eosinophil # : x  Auto Basophil # : x  Auto Neutrophil % : x  Auto Lymphocyte % : x  Auto Monocyte % : x  Auto Eosinophil % : x  Auto Basophil % : x    05-22    142  |  99  |  20  ----------------------------<  93  3.9   |  24  |  0.86  05-21    142  |  102  |  19  ----------------------------<  81  3.8   |  22  |  0.91      Ca    10.0      22 May 2017 05:35  Ca    9.7      21 May 2017 06:30  Phos  4.1     05-22  Phos  3.9     05-21  Mg     2.1     05-22  Mg     2.1     05-21    TPro  8.7<H>  /  Alb  4.2  /  TBili  0.8  /  DBili  x   /  AST  26  /  ALT  30  /  AlkPhos  94  05-21  TPro  8.8<H>  /  Alb  x   /  TBili  x   /  DBili  x   /  AST  x   /  ALT  x   /  AlkPhos  x   05-21    Serum Pro-Brain Natriuretic Peptide (05.18.17 @ 12:46)    Serum Pro-Brain Natriuretic Peptide: 5728: ACUTE CONGESTIVE HEART FAILURE is UNLIKELY if NT-proBNP is < 300 pg/mL.    CONSIDER ACUTE CONGESTIVE HEART FAILURE if:    AGE                NT-proBNP RESULT  ---                -------------  < 50 YEARS      >  450 pg/mL  50 - 75 YEARS      >  900 [id number]: /mL  > 75 YEARS      > 1800 pg/mL    All results require clinical correlation.  Consider obtaining a  baseline or "dry" NT-proBNP level when the patient is stabilized,  so that subsequent levels can be compared to that value.  Patients  with [id number]:  CHF may have elevated NT-proBNP levels.  Acute  failure episodes generally produce levels at least 25% greater  than baseline levels.  The above values are derived from a large  multi-center international study, "VALERY Hi et al, [place]  Heart : ournal, 2006; 27:330-337."   pg/mL      proBNP: Serum Pro-Brain Natriuretic Peptide (05.21.17 @ 06:30)    Serum Pro-Brain Natriuretic Peptide: 1304: ACUTE CONGESTIVE HEART FAILURE is UNLIKELY if NT-proBNP is < 300 pg/mL.    CONSIDER ACUTE CONGESTIVE HEART FAILURE if:    AGE                NT-proBNP RESULT  ---                -------------  < 50 YEARS      >  450 pg/mL  50 - 75 YEARS      >  900 [id number]: /mL  > 75 YEARS      > 1800 pg/mL    All results require clinical correlation.  Consider obtaining a  baseline or "dry" NT-proBNP level when the patient is stabilized,  so that subsequent levels can be compared to that value.  Patients  with [id number]:  CHF may have elevated NT-proBNP levels.  Acute  failure episodes generally produce levels at least 25% greater  than baseline levels.  The above values are derived from a large  multi-center international study, "VALERY Hi et al, [place]  Heart : ournal, 2006; 27:330-337."   pg/mL         TSH: Thyroid Stimulating Hormone, Serum (05.18.17 @ 12:46)    Thyroid Stimulating Hormone, Serum: 1.63 uIU/mL          TELEMETRY: 	 NSR   ECG:     	    PREVIOUS DIAGNOSTIC TESTING:    [ ] Echocardiogram: CONCLUSIONS:  EF 24%  1. Normal mitral valve. Mild-moderate mitral regurgitation.  2. Moderately dilated left atrium.  LA volume index = 43  cc/m2.  3. Mild left ventricular enlargement.  4. Severe global left ventricular systolic dysfunction.  5. Normal right ventricular size with decreased right  ventricular systolic function.  6. Estimated right ventricular systolic pressure equals 61  mm Hg, assuming right atrial pressure equals 10 mm Hg,  consistent with severe pulmonary hypertension.  *** No previous Echo exam.  ------------------------------------------------------------------------  Confirmed on  5/18/2017 - 18:34:13 by Kendell Howe M.D.  ------------------------------------------------------------------------    ASSESSMENT:  26F with gestational HTN, treated with labetalol now s/p urgent C section on 4/11/17 for preeclampsia. Patient continued post C section with SOB and tachycardia which briefly improved and then worsened few days preadmission. Patient now with acute decompensated systolic heart failure and likely postpartum cardiomyopathy.    PLAN: Would continue lasix/ spironolactone. RHC to check hemodynamics. Continue BP management with lisinopril. Would consider adding coreg when pressures are known and patient is more euvolemic. Date of Admission: 5/18/2017    CHIEF COMPLAINT: increasing SOB and GONZALEZ    HISTORY OF PRESENT ILLNESS:  26F with gestational HTN, treated with labetalol now s/p urgent C section on 4/11/17 for preeclampsia. Patient continued post C section with SOB and tachycardia which briefly improved and then worsened few days preadmission. ECHO revealed EF 24% with severe global LV dysfunction. Admitted to CCU for diuresis and BP control. BP improved but patient remains SOB with exertion and tachycardic. HF consulted for RHC and further management.    Allergies    No Known Allergies    Intolerances    	    MEDICATIONS:  heparin  Injectable 5000Unit(s) SubCutaneous every 8 hours  lisinopril 5milliGRAM(s) Oral daily  spironolactone 12.5milliGRAM(s) Oral daily  furosemide   Injectable 40milliGRAM(s) IV Push every 12 hours    senna 2Tablet(s) Oral at bedtime  docusate sodium 100milliGRAM(s) Oral three times a day      chlorhexidine 4% Liquid 1Application(s) Topical daily  multivitamin 1Tablet(s) Oral daily  folic acid 1milliGRAM(s) Oral daily      PAST MEDICAL & SURGICAL HISTORY:  Pre-eclampsia complicating hypertension  Pregnancy induced hypertension, antepartum  No pertinent past medical history  No significant past surgical history      FAMILY HISTORY:  No pertinent family history in first degree relatives      SOCIAL HISTORY:  denies smoking or ETOH history      REVIEW OF SYSTEMS:  See HPI. Otherwise, 10 point ROS done and otherwise negative.    PHYSICAL EXAM:  T(C): 36.6, Max: 36.8 (05-21 @ 20:00)  HR: 107 (88 - 107)  BP: 132/58 (90/52 - 132/58)  RR: 20 (16 - 20)  SpO2: 99% (97% - 100%)  Wt(kg): --  I&O's Summary  I & Os for 24h ending 22 May 2017 07:00  =============================================  IN: 700 ml / OUT: 2850 ml / NET: -2150 ml    I & Os for current day (as of 22 May 2017 11:40)  =============================================  IN: 200 ml / OUT: 0 ml / NET: 200 ml      Appearance: Normal	  HEENT:   Normal oral mucosa, PERRL, EOMI	  Lymphatic: No lymphadenopathy  Cardiovascular: Normal S1 S2, No JVD, No murmurs, No edema  Respiratory: few crackles @ bases  Psychiatry: A & O x 3, Mood & affect appropriate  Gastrointestinal:  Soft, Non-tender, + BS	  Skin: No rashes, No ecchymoses, No cyanosis	  Neurologic: Non-focal  Extremities: Normal range of motion, No clubbing, cyanosis or edema  Vascular: Peripheral pulses palpable 2+ bilaterally        LABS:	 	    CBC Full  -  ( 22 May 2017 05:35 )  WBC Count : 8.07 K/uL  Hemoglobin : 12.8 g/dL  Hematocrit : 41.4 %  Platelet Count - Automated : 383 K/uL  Mean Cell Volume : 78.7 fL  Mean Cell Hemoglobin : 24.3 pg  Mean Cell Hemoglobin Concentration : 30.9 %  Auto Neutrophil # : x  Auto Lymphocyte # : x  Auto Monocyte # : x  Auto Eosinophil # : x  Auto Basophil # : x  Auto Neutrophil % : x  Auto Lymphocyte % : x  Auto Monocyte % : x  Auto Eosinophil % : x  Auto Basophil % : x    05-22    142  |  99  |  20  ----------------------------<  93  3.9   |  24  |  0.86  05-21    142  |  102  |  19  ----------------------------<  81  3.8   |  22  |  0.91      Ca    10.0      22 May 2017 05:35  Ca    9.7      21 May 2017 06:30  Phos  4.1     05-22  Phos  3.9     05-21  Mg     2.1     05-22  Mg     2.1     05-21    TPro  8.7<H>  /  Alb  4.2  /  TBili  0.8  /  DBili  x   /  AST  26  /  ALT  30  /  AlkPhos  94  05-21  TPro  8.8<H>  /  Alb  x   /  TBili  x   /  DBili  x   /  AST  x   /  ALT  x   /  AlkPhos  x   05-21    Serum Pro-Brain Natriuretic Peptide (05.18.17 @ 12:46)    Serum Pro-Brain Natriuretic Peptide: 5728: ACUTE CONGESTIVE HEART FAILURE is UNLIKELY if NT-proBNP is < 300 pg/mL.    CONSIDER ACUTE CONGESTIVE HEART FAILURE if:    AGE                NT-proBNP RESULT  ---                -------------  < 50 YEARS      >  450 pg/mL  50 - 75 YEARS      >  900 [id number]: /mL  > 75 YEARS      > 1800 pg/mL    All results require clinical correlation.  Consider obtaining a  baseline or "dry" NT-proBNP level when the patient is stabilized,  so that subsequent levels can be compared to that value.  Patients  with [id number]:  CHF may have elevated NT-proBNP levels.  Acute  failure episodes generally produce levels at least 25% greater  than baseline levels.  The above values are derived from a large  multi-center international study, "VALERY Hi, et al, [place]  Heart : ournal, 2006; 27:330-337."   pg/mL      proBNP: Serum Pro-Brain Natriuretic Peptide (05.21.17 @ 06:30)    Serum Pro-Brain Natriuretic Peptide: 1304: ACUTE CONGESTIVE HEART FAILURE is UNLIKELY if NT-proBNP is < 300 pg/mL.    CONSIDER ACUTE CONGESTIVE HEART FAILURE if:    AGE                NT-proBNP RESULT  ---                -------------  < 50 YEARS      >  450 pg/mL  50 - 75 YEARS      >  900 [id number]: /mL  > 75 YEARS      > 1800 pg/mL    All results require clinical correlation.  Consider obtaining a  baseline or "dry" NT-proBNP level when the patient is stabilized,  so that subsequent levels can be compared to that value.  Patients  with [id number]:  CHF may have elevated NT-proBNP levels.  Acute  failure episodes generally produce levels at least 25% greater  than baseline levels.  The above values are derived from a large  multi-center international study, "VALERY Hi et al, [place]  Heart : ournal, 2006; 27:330-337."   pg/mL         TSH: Thyroid Stimulating Hormone, Serum (05.18.17 @ 12:46)    Thyroid Stimulating Hormone, Serum: 1.63 uIU/mL          TELEMETRY: 	 NSR   ECG:  NSR with TWI in V2-V6.   	    PREVIOUS DIAGNOSTIC TESTING:    [ ] Echocardiogram: CONCLUSIONS:  EF 24%  1. Normal mitral valve. Mild-moderate mitral regurgitation.  2. Moderately dilated left atrium.  LA volume index = 43  cc/m2.  3. Mild left ventricular enlargement.  4. Severe global left ventricular systolic dysfunction.  5. Normal right ventricular size with decreased right  ventricular systolic function.  6. Estimated right ventricular systolic pressure equals 61  mm Hg, assuming right atrial pressure equals 10 mm Hg,  consistent with severe pulmonary hypertension.  *** No previous Echo exam.  ------------------------------------------------------------------------  Confirmed on  5/18/2017 - 18:34:13 by Kendell Howe M.D.  ------------------------------------------------------------------------    ASSESSMENT:  26F with gestational HTN, treated with labetalol now s/p urgent C section on 4/11/17 for preeclampsia. Patient continued post C section with SOB and tachycardia which briefly improved and then worsened few days preadmission. Patient now with acute decompensated systolic heart failure and likely postpartum cardiomyopathy.    PLAN: Would continue lasix/ spironolactone. RHC to check hemodynamics. Continue BP management with lisinopril. Would consider adding coreg when pressures are known and patient is more euvolemic.

## 2017-05-22 NOTE — PROGRESS NOTE ADULT - ATTENDING COMMENTS
Patient is 26 year old woman with peripartum cardiomyopathy, acute systolic heart failure with fluid overload. Events over weekend noted.  -Over weekend, trial of carvedilol attempted but patient appeared very fluid overloaded, so stopped to help CO  -Will need RHC to assess CO/CI and fluid status  -Continue Lisinopril, aldactone and lasix IV

## 2017-05-22 NOTE — PROGRESS NOTE ADULT - SUBJECTIVE AND OBJECTIVE BOX
Date of Admission:    24H hour events:     Vital Signs Last 24 Hrs  T(C): 36.4, Max: 36.8 (05-21 @ 20:00)  T(F): 97.5, Max: 98.2 (05-21 @ 20:00)  HR: 104 (82 - 107)  BP: 120/44 (90/52 - 128/85)  BP(mean): 65 (57 - 92)  RR: 16 (16 - 20)  SpO2: 99% (97% - 100%)  I&O's Summary    I & Os for current day (as of 22 May 2017 07:26)  =============================================  IN: 700 ml / OUT: 2850 ml / NET: -2150 ml      MEDICATIONS:  heparin  Injectable 5000Unit(s) SubCutaneous every 8 hours  lisinopril 5milliGRAM(s) Oral daily  spironolactone 12.5milliGRAM(s) Oral daily  furosemide   Injectable 40milliGRAM(s) IV Push every 12 hours              chlorhexidine 4% Liquid 1Application(s) Topical daily  multivitamin 1Tablet(s) Oral daily  folic acid 1milliGRAM(s) Oral daily      REVIEW OF SYSTEMS:  Complete 10point ROS negative.    PHYSICAL EXAM:  Appearance: Normal	  HEENT:   Normal oral mucosa, PERRL, EOMI	  Lymphatic: No lymphadenopathy  Cardiovascular: Normal S1 S2, No JVD, No murmurs, No edema  Respiratory: Lungs clear to auscultation	  Psychiatry: A & O x 3, Mood & affect appropriate  Gastrointestinal:  Soft, Non-tender, + BS	  Skin: No rashes, No ecchymoses, No cyanosis	  Neurologic: Non-focal  Extremities: Normal range of motion, No clubbing, cyanosis or edema  Vascular: Peripheral pulses palpable 2+ bilaterally        LABS:	 	    CBC Full  -  ( 22 May 2017 05:35 )  WBC Count : 8.07 K/uL  Hemoglobin : 12.8 g/dL  Hematocrit : 41.4 %  Platelet Count - Automated : 383 K/uL  Mean Cell Volume : 78.7 fL  Mean Cell Hemoglobin : 24.3 pg  Mean Cell Hemoglobin Concentration : 30.9 %  Auto Neutrophil # : x  Auto Lymphocyte # : x  Auto Monocyte # : x  Auto Eosinophil # : x  Auto Basophil # : x  Auto Neutrophil % : x  Auto Lymphocyte % : x  Auto Monocyte % : x  Auto Eosinophil % : x  Auto Basophil % : x    05-21    142  |  102  |  19  ----------------------------<  81  3.8   |  22  |  0.91    Ca    9.7      21 May 2017 06:30  Phos  3.9     05-21  Mg     2.1     05-21    TPro  8.7<H>  /  Alb  4.2  /  TBili  0.8  /  DBili  x   /  AST  26  /  ALT  30  /  AlkPhos  94  05-21  TPro  8.8<H>  /  Alb  x   /  TBili  x   /  DBili  x   /  AST  x   /  ALT  x   /  AlkPhos  x   05-21      proBNP: Serum Pro-Brain Natriuretic Peptide: 1304 pg/mL (05-21 @ 06:30)  Serum Pro-Brain Natriuretic Peptide: 5728 pg/mL (05-18 @ 12:46)    Lipid Profile:   HgA1c:   TSH:       CARDIAC MARKERS:            TELEMETRY: 	    ECG:  	  RADIOLOGY:  ECHO:  OTHER: 	    PREVIOUS DIAGNOSTIC TESTING:    [ ] Echocardiogram:  [ ]  Catheterization:  [ ] Stress Test:  	  	  ASSESSMENT/PLAN: 	    [place] [id number] Date of Admission:    24H hour events: tachycardia to 120s o/n exertional. a bit of chest pressure, very mild SOB w/exertion. no f/c, no abd pain, no vaginal complaints, no weakness/numbness, mild headache, no BM since admission    Vital Signs Last 24 Hrs  T(C): 36.4, Max: 36.8 (05-21 @ 20:00)  T(F): 97.5, Max: 98.2 (05-21 @ 20:00)  HR: 104 (82 - 107)  BP: 120/44 (90/52 - 128/85)  BP(mean): 65 (57 - 92)  RR: 16 (16 - 20)  SpO2: 99% (97% - 100%)  I&O's Summary    I & Os for current day (as of 22 May 2017 07:26)  =============================================  IN: 700 ml / OUT: 2850 ml / NET: -2150 ml      MEDICATIONS:  heparin  Injectable 5000Unit(s) SubCutaneous every 8 hours  lisinopril 5milliGRAM(s) Oral daily  spironolactone 12.5milliGRAM(s) Oral daily  furosemide   Injectable 40milliGRAM(s) IV Push every 12 hours  chlorhexidine 4% Liquid 1Application(s) Topical daily  multivitamin 1Tablet(s) Oral daily  folic acid 1milliGRAM(s) Oral daily      REVIEW OF SYSTEMS:  Complete 10point ROS negative.    PHYSICAL EXAM:  Appearance: NAD, well-appearing	  Cardiovascular: Normal S1 S2, mild JVD, +tachycardia, No murmurs, No edema  Respiratory: mild fine crackles b/l sounds at bases,   Psychiatry: AAO, Mood & affect appropriate  Gastrointestinal:  Soft, Non-tender, + BS,  c/d/i	  Skin: No rashes, No ecchymoses, No cyanosis	  Neuro: Moving all limbs  Extremities: Normal range of motion, No clubbing, cyanosis or edema  Vascular: Peripheral pulses palpable 2+ bilaterally        LABS:	 	    CBC Full  -  ( 22 May 2017 05:35 )  WBC Count : 8.07 K/uL  Hemoglobin : 12.8 g/dL  Hematocrit : 41.4 %  Platelet Count - Automated : 383 K/uL  Mean Cell Volume : 78.7 fL  Mean Cell Hemoglobin : 24.3 pg  Mean Cell Hemoglobin Concentration : 30.9 %  Auto Neutrophil # : x  Auto Lymphocyte # : x  Auto Monocyte # : x  Auto Eosinophil # : x  Auto Basophil # : x  Auto Neutrophil % : x  Auto Lymphocyte % : x  Auto Monocyte % : x  Auto Eosinophil % : x  Auto Basophil % : x        142  |  102  |  19  ----------------------------<  81  3.8   |  22  |  0.91    Ca    9.7      21 May 2017 06:30  Phos  3.9       Mg     2.1         TPro  8.7<H>  /  Alb  4.2  /  TBili  0.8  /  DBili  x   /  AST  26  /  ALT  30  /  AlkPhos  94    TPro  8.8<H>  /  Alb  x   /  TBili  x   /  DBili  x   /  AST  x   /  ALT  x   /  AlkPhos  x         proBNP: Serum Pro-Brain Natriuretic Peptide: 1304 pg/mL ( @ 06:30)  Serum Pro-Brain Natriuretic Peptide: 5728 pg/mL ( @ 12:46)      TELEMETRY: 	tachycardia to 120    ECG:  	  RADIOLOGY:  OTHER: 	    PREVIOUS DIAGNOSTIC TESTING:    [ ] Echocardiogram:  24% EF, severe global ventricular systolic dysfunction, normal RV size with decreased RV systolic dysfunction, severe pulmonary hypertension.   [ ]  Catheterization:  [ ] Stress Test:  	  	  Mono Peralta PGY1 Date of Admission:    24H hour events: tachycardia to 120s o/n exertional. a bit of chest pressure, very mild SOB w/exertion. no f/c, no abd pain, no vaginal complaints, no weakness/numbness, mild headache, no BM since admission    Vital Signs Last 24 Hrs  T(C): 36.4, Max: 36.8 (- @ 20:00)  T(F): 97.5, Max: 98.2 ( @ 20:00)  HR: 104 (82 - 107)  BP: 120/44 (90/52 - 128/85)  BP(mean): 65 (57 - 92)  RR: 16 (16 - 20)  SpO2: 99% (97% - 100%)  I&O's Summary    I & Os for current day (as of 22 May 2017 07:26)  =============================================  IN: 700 ml / OUT: 2850 ml / NET: -2150 ml      MEDICATIONS:  heparin  Injectable 5000Unit(s) SubCutaneous every 8 hours  lisinopril 5milliGRAM(s) Oral daily  spironolactone 12.5milliGRAM(s) Oral daily  furosemide   Injectable 40milliGRAM(s) IV Push every 12 hours  chlorhexidine 4% Liquid 1Application(s) Topical daily  multivitamin 1Tablet(s) Oral daily  folic acid 1milliGRAM(s) Oral daily      REVIEW OF SYSTEMS:  Complete 10point ROS negative.    PHYSICAL EXAM:  Appearance: NAD, well-appearing	  Cardiovascular: Normal S1 S2, mild JVD, +tachycardia, No murmurs, No edema  Respiratory: mild fine crackles b/l sounds at bases,   Psychiatry: AAO, Mood & affect appropriate  Gastrointestinal:  Soft, Non-tender, + BS,  c/d/i	  Skin: No rashes, No ecchymoses, No cyanosis	  Neuro: Moving all limbs, 5/5 strength in all extremities, pupils reactive b/l  Extremities: Normal range of motion, No clubbing, cyanosis or edema  Vascular: Peripheral pulses palpable 2+ bilaterally        LABS:	 	    CBC Full  -  ( 22 May 2017 05:35 )  WBC Count : 8.07 K/uL  Hemoglobin : 12.8 g/dL  Hematocrit : 41.4 %  Platelet Count - Automated : 383 K/uL  Mean Cell Volume : 78.7 fL  Mean Cell Hemoglobin : 24.3 pg  Mean Cell Hemoglobin Concentration : 30.9 %  Auto Neutrophil # : x  Auto Lymphocyte # : x  Auto Monocyte # : x  Auto Eosinophil # : x  Auto Basophil # : x  Auto Neutrophil % : x  Auto Lymphocyte % : x  Auto Monocyte % : x  Auto Eosinophil % : x  Auto Basophil % : x        142  |  102  |  19  ----------------------------<  81  3.8   |  22  |  0.91    Ca    9.7      21 May 2017 06:30  Phos  3.9       Mg     2.1         TPro  8.7<H>  /  Alb  4.2  /  TBili  0.8  /  DBili  x   /  AST  26  /  ALT  30  /  AlkPhos  94    TPro  8.8<H>  /  Alb  x   /  TBili  x   /  DBili  x   /  AST  x   /  ALT  x   /  AlkPhos  x         proBNP: Serum Pro-Brain Natriuretic Peptide: 1304 pg/mL ( @ 06:30)  Serum Pro-Brain Natriuretic Peptide: 5728 pg/mL ( @ 12:46)      TELEMETRY: 	tachycardia to 120    ECG:  	  RADIOLOGY:  OTHER: 	    PREVIOUS DIAGNOSTIC TESTING:    [ ] Echocardiogram:  24% EF, severe global ventricular systolic dysfunction, normal RV size with decreased RV systolic dysfunction, severe pulmonary hypertension.   [ ]  Catheterization:  [ ] Stress Test:  	  	  Mono Peralta PGY1 Date of Admission:    24H hour events: tachycardia to 120s o/n exertional. a bit of chest pressure, very mild SOB w/exertion. no f/c, no abd pain, no vaginal complaints, no weakness/numbness, mild headache, no BM since admission    Vital Signs Last 24 Hrs  T(C): 36.4, Max: 36.8 (- @ 20:00)  T(F): 97.5, Max: 98.2 ( @ 20:00)  HR: 104 (82 - 107)  BP: 120/44 (90/52 - 128/85)  BP(mean): 65 (57 - 92)  RR: 16 (16 - 20)  SpO2: 99% (97% - 100%)  I&O's Summary    I & Os for current day (as of 22 May 2017 07:26)  =============================================  IN: 700 ml / OUT: 2850 ml / NET: -2150 ml      MEDICATIONS:  heparin  Injectable 5000Unit(s) SubCutaneous every 8 hours  lisinopril 5milliGRAM(s) Oral daily  spironolactone 12.5milliGRAM(s) Oral daily  furosemide   Injectable 40milliGRAM(s) IV Push every 12 hours  chlorhexidine 4% Liquid 1Application(s) Topical daily  multivitamin 1Tablet(s) Oral daily  folic acid 1milliGRAM(s) Oral daily      REVIEW OF SYSTEMS:  Complete 10point ROS negative.    PHYSICAL EXAM:  Appearance: NAD, well-appearing	  Cardiovascular: Normal S1 S2, mild JVD, +tachycardia, No murmurs, No edema  Respiratory: mild fine crackles b/l sounds at bases,   Psychiatry: AAO, Mood & affect appropriate  Gastrointestinal:  Soft, Non-tender, + BS,  c/d/i	  Skin: No rashes, No ecchymoses, No cyanosis	  Neuro: Moving all limbs, 5/5 strength in all extremities, pupils reactive b/l  Extremities: Normal range of motion, No clubbing, cyanosis or edema  Vascular: Peripheral pulses palpable 2+ bilaterally        LABS:	 	    CBC Full  -  ( 22 May 2017 05:35 )  WBC Count : 8.07 K/uL  Hemoglobin : 12.8 g/dL  Hematocrit : 41.4 %  Platelet Count - Automated : 383 K/uL  Mean Cell Volume : 78.7 fL  Mean Cell Hemoglobin : 24.3 pg  Mean Cell Hemoglobin Concentration : 30.9 %  Auto Neutrophil # : x  Auto Lymphocyte # : x  Auto Monocyte # : x  Auto Eosinophil # : x  Auto Basophil # : x  Auto Neutrophil % : x  Auto Lymphocyte % : x  Auto Monocyte % : x  Auto Eosinophil % : x  Auto Basophil % : x        142  |  99  |  20  ----------------------------<  92  3.9   |  24  |  0.86    Ca    10.1        Phos  4.1       Mg     2.1         proBNP: Serum Pro-Brain Natriuretic Peptide: 1304 pg/mL ( @ 06:30)  Serum Pro-Brain Natriuretic Peptide: 5728 pg/mL ( @ 12:46)      TELEMETRY: 	tachycardia to 120    ECG:  	  RADIOLOGY:  OTHER: 	    PREVIOUS DIAGNOSTIC TESTING:    [ ] Echocardiogram:  24% EF, severe global ventricular systolic dysfunction, normal RV size with decreased RV systolic dysfunction, severe pulmonary hypertension.   [ ]  Catheterization:  [ ] Stress Test:  	  	  Mono Peralta PGY1

## 2017-05-22 NOTE — PROGRESS NOTE ADULT - SUBJECTIVE AND OBJECTIVE BOX
S: Patient seen and evaluated at 9am.  Patient has no acute complaints.  Patient denies any HA, dizziness, chest pain, palpitations, abdominal pain, lower extremity pain.  SOB improved.      Tolerating regular diet.  +voiding.    Patient is tearful and states she is overwhelmed and is finally able to take in everything that has happened in the past few days.      O: VS: T(C): 36.8, Max: 36.8 (05-21 @ 20:00)  HR: 99 (88 - 126)  BP: 108/82 (90/52 - 152/73)  RR: 16 (16 - 20)  SpO2: 100% (97% - 100%)  Wt(kg): --  I/Os:I & Os for 24h ending 05-22 @ 07:00  =============================================  IN: 700 ml / OUT: 2850 ml / NET: -2150 ml    I & Os for current day (as of 05-22 @ 19:10)  =============================================  IN: 400 ml / OUT: 400 ml / NET: 0 ml    Gen: Appears tearful but in NAD.  CV:  Tachycardic.  Normal S1 and S2.  Pulm: CTAB  Abd: Soft, NT, ND.    Ext: NT bilaterally

## 2017-05-22 NOTE — CONSULT NOTE ADULT - ATTENDING COMMENTS
26yrs gestational HTN, severe preclampsia requiring Csection Aril 11th at 37.5 weeks. No known HF issues during that admission.   Admitted 5.18 after progressive severe SOB. On direct questions perhaps some SOBOE since delivery. Of note tachycaardic since d/c after delivery.   on Admissions. Found in ADHF, CXR compatible congestion, bilat effusions, not in significant distress, 180/100, resting SR 120s.   Treated with nitro drip, lasix 40 IV bid. Successful diuresis. Nitro weaned. Started on low dose Lisinopril 10. Aldactone 12.5. Lasix 40 IV BID.   No other PMH. Baby well. No breast feeding.   Labs: Normal renal function. NTpro [id number], no other abrnomalities.   EKG: P pulmonale, IVCD. diffuse ST abnormaity.  TTE 5.18: LA 4.1, LVEDD 6.1, EF 24%, RVSP 61. 26yrs gestational HTN, severe preclampsia requiring Csection Aril 11th at 37.5 weeks. No known HF issues during that admission.   Admitted 5.18 after progressive severe SOB. On direct questions perhaps some SOBOE since delivery. Of note tachycaardic since d/c after delivery.   on Admissions. Found in ADHF, CXR compatible congestion, bilat effusions, not in significant distress, 180/100, resting SR 120s.   Treated with nitro drip, lasix 40 IV bid. Successful diuresis. Nitro weaned. Started on low dose Lisinopril 10. Aldactone 12.5. Lasix 40 IV BID.   No other PMH. Baby well. No breast feeding.   Labs: Normal renal function. NTpro [id number], no other abrnomalities.   EKG: P pulmonale, IVCD. diffuse ST abnormaity.  TTE 5.18: LA 4.1, LVEDD 6.1, EF 24%, RVSP 61.  Exam: , 150/80 no JVP, lungs clear, HS normal, abdo soft no LE edema.   26yrs with peripartum cardiomyopathy. Patient gives FH of cardiomyopathy (aunt HF age 40, and aunts son age 20  with SCD). Possible cardiac injury at time of preeclampsia. Now appears well compensated, however resting tachycardia is of concern. Of note patient was d/c post partum on 600mg labetalol which has been discontinued. Patient will require close follow up in CCU to ensure that she tolerates escalation of HF medical regimen. SUGGEST: start coreg 6.25 bid, captopril 12.5 tid, digoxin load, continue diuretics. Would defer right heart cath.  James Mace

## 2017-05-23 DIAGNOSIS — N17.9 ACUTE KIDNEY FAILURE, UNSPECIFIED: ICD-10-CM

## 2017-05-23 LAB
ALBUMIN SERPL ELPH-MCNC: 4.4 G/DL — SIGNIFICANT CHANGE UP (ref 3.3–5)
ALP SERPL-CCNC: 100 U/L — SIGNIFICANT CHANGE UP (ref 40–120)
ALT FLD-CCNC: 35 U/L — HIGH (ref 4–33)
APTT BLD: 34.3 SEC — SIGNIFICANT CHANGE UP (ref 27.5–37.4)
AST SERPL-CCNC: 29 U/L — SIGNIFICANT CHANGE UP (ref 4–32)
BILIRUB SERPL-MCNC: 0.8 MG/DL — SIGNIFICANT CHANGE UP (ref 0.2–1.2)
BUN SERPL-MCNC: 28 MG/DL — HIGH (ref 7–23)
BUN SERPL-MCNC: 31 MG/DL — HIGH (ref 7–23)
CALCIUM SERPL-MCNC: 10.1 MG/DL — SIGNIFICANT CHANGE UP (ref 8.4–10.5)
CALCIUM SERPL-MCNC: 9.8 MG/DL — SIGNIFICANT CHANGE UP (ref 8.4–10.5)
CHLORIDE SERPL-SCNC: 100 MMOL/L — SIGNIFICANT CHANGE UP (ref 98–107)
CHLORIDE SERPL-SCNC: 95 MMOL/L — LOW (ref 98–107)
CO2 SERPL-SCNC: 22 MMOL/L — SIGNIFICANT CHANGE UP (ref 22–31)
CO2 SERPL-SCNC: 24 MMOL/L — SIGNIFICANT CHANGE UP (ref 22–31)
CREAT SERPL-MCNC: 1.27 MG/DL — SIGNIFICANT CHANGE UP (ref 0.5–1.3)
CREAT SERPL-MCNC: 1.29 MG/DL — SIGNIFICANT CHANGE UP (ref 0.5–1.3)
GAS PNL BLDMV: SIGNIFICANT CHANGE UP
GAS PNL BLDMV: SIGNIFICANT CHANGE UP
GLUCOSE SERPL-MCNC: 112 MG/DL — HIGH (ref 70–99)
GLUCOSE SERPL-MCNC: 93 MG/DL — SIGNIFICANT CHANGE UP (ref 70–99)
HCT VFR BLD CALC: 40 % — SIGNIFICANT CHANGE UP (ref 34.5–45)
HCT VFR BLD CALC: 40.1 % — SIGNIFICANT CHANGE UP (ref 34.5–45)
HGB BLD-MCNC: 12.1 G/DL — SIGNIFICANT CHANGE UP (ref 11.5–15.5)
HGB BLD-MCNC: 12.4 G/DL — SIGNIFICANT CHANGE UP (ref 11.5–15.5)
INR BLD: 1.13 — SIGNIFICANT CHANGE UP (ref 0.88–1.17)
MAGNESIUM SERPL-MCNC: 2.3 MG/DL — SIGNIFICANT CHANGE UP (ref 1.6–2.6)
MAGNESIUM SERPL-MCNC: 2.3 MG/DL — SIGNIFICANT CHANGE UP (ref 1.6–2.6)
MCHC RBC-ENTMCNC: 23.9 PG — LOW (ref 27–34)
MCHC RBC-ENTMCNC: 24.3 PG — LOW (ref 27–34)
MCHC RBC-ENTMCNC: 30.3 % — LOW (ref 32–36)
MCHC RBC-ENTMCNC: 30.9 % — LOW (ref 32–36)
MCV RBC AUTO: 78.6 FL — LOW (ref 80–100)
MCV RBC AUTO: 79.1 FL — LOW (ref 80–100)
PHOSPHATE SERPL-MCNC: 4.6 MG/DL — HIGH (ref 2.5–4.5)
PHOSPHATE SERPL-MCNC: 5 MG/DL — HIGH (ref 2.5–4.5)
PLATELET # BLD AUTO: 373 K/UL — SIGNIFICANT CHANGE UP (ref 150–400)
PLATELET # BLD AUTO: 378 K/UL — SIGNIFICANT CHANGE UP (ref 150–400)
PMV BLD: 10 FL — SIGNIFICANT CHANGE UP (ref 7–13)
PMV BLD: 10.8 FL — SIGNIFICANT CHANGE UP (ref 7–13)
POTASSIUM SERPL-MCNC: 4.1 MMOL/L — SIGNIFICANT CHANGE UP (ref 3.5–5.3)
POTASSIUM SERPL-MCNC: 4.2 MMOL/L — SIGNIFICANT CHANGE UP (ref 3.5–5.3)
POTASSIUM SERPL-SCNC: 4.1 MMOL/L — SIGNIFICANT CHANGE UP (ref 3.5–5.3)
POTASSIUM SERPL-SCNC: 4.2 MMOL/L — SIGNIFICANT CHANGE UP (ref 3.5–5.3)
PROT SERPL-MCNC: 9 G/DL — HIGH (ref 6–8.3)
PROTHROM AB SERPL-ACNC: 12.7 SEC — SIGNIFICANT CHANGE UP (ref 9.8–13.1)
RBC # BLD: 5.06 M/UL — SIGNIFICANT CHANGE UP (ref 3.8–5.2)
RBC # BLD: 5.1 M/UL — SIGNIFICANT CHANGE UP (ref 3.8–5.2)
RBC # FLD: 16.5 % — HIGH (ref 10.3–14.5)
RBC # FLD: 16.6 % — HIGH (ref 10.3–14.5)
SODIUM SERPL-SCNC: 137 MMOL/L — SIGNIFICANT CHANGE UP (ref 135–145)
SODIUM SERPL-SCNC: 140 MMOL/L — SIGNIFICANT CHANGE UP (ref 135–145)
WBC # BLD: 8.27 K/UL — SIGNIFICANT CHANGE UP (ref 3.8–10.5)
WBC # BLD: 9.3 K/UL — SIGNIFICANT CHANGE UP (ref 3.8–10.5)
WBC # FLD AUTO: 8.27 K/UL — SIGNIFICANT CHANGE UP (ref 3.8–10.5)
WBC # FLD AUTO: 9.3 K/UL — SIGNIFICANT CHANGE UP (ref 3.8–10.5)

## 2017-05-23 PROCEDURE — 93306 TTE W/DOPPLER COMPLETE: CPT | Mod: 26

## 2017-05-23 PROCEDURE — 99233 SBSQ HOSP IP/OBS HIGH 50: CPT

## 2017-05-23 PROCEDURE — 71010: CPT | Mod: 26

## 2017-05-23 RX ORDER — CARVEDILOL PHOSPHATE 80 MG/1
6.25 CAPSULE, EXTENDED RELEASE ORAL ONCE
Qty: 0 | Refills: 0 | Status: COMPLETED | OUTPATIENT
Start: 2017-05-23 | End: 2017-05-23

## 2017-05-23 RX ORDER — HEPARIN SODIUM 5000 [USP'U]/ML
1900 INJECTION INTRAVENOUS; SUBCUTANEOUS
Qty: 25000 | Refills: 0 | Status: DISCONTINUED | OUTPATIENT
Start: 2017-05-23 | End: 2017-05-24

## 2017-05-23 RX ORDER — CARVEDILOL PHOSPHATE 80 MG/1
6.25 CAPSULE, EXTENDED RELEASE ORAL EVERY 12 HOURS
Qty: 0 | Refills: 0 | Status: DISCONTINUED | OUTPATIENT
Start: 2017-05-23 | End: 2017-05-23

## 2017-05-23 RX ORDER — CARVEDILOL PHOSPHATE 80 MG/1
9.38 CAPSULE, EXTENDED RELEASE ORAL EVERY 12 HOURS
Qty: 0 | Refills: 0 | Status: DISCONTINUED | OUTPATIENT
Start: 2017-05-23 | End: 2017-05-28

## 2017-05-23 RX ORDER — FUROSEMIDE 40 MG
40 TABLET ORAL DAILY
Qty: 0 | Refills: 0 | Status: DISCONTINUED | OUTPATIENT
Start: 2017-05-24 | End: 2017-05-28

## 2017-05-23 RX ORDER — CAPTOPRIL 12.5 MG/1
6.25 TABLET ORAL EVERY 8 HOURS
Qty: 0 | Refills: 0 | Status: DISCONTINUED | OUTPATIENT
Start: 2017-05-23 | End: 2017-05-24

## 2017-05-23 RX ORDER — DIGOXIN 250 MCG
0.25 TABLET ORAL ONCE
Qty: 0 | Refills: 0 | Status: COMPLETED | OUTPATIENT
Start: 2017-05-23 | End: 2017-05-23

## 2017-05-23 RX ADMIN — CARVEDILOL PHOSPHATE 6.25 MILLIGRAM(S): 80 CAPSULE, EXTENDED RELEASE ORAL at 10:30

## 2017-05-23 RX ADMIN — CAPTOPRIL 6.25 MILLIGRAM(S): 12.5 TABLET ORAL at 23:01

## 2017-05-23 RX ADMIN — SPIRONOLACTONE 12.5 MILLIGRAM(S): 25 TABLET, FILM COATED ORAL at 06:16

## 2017-05-23 RX ADMIN — Medication 1 TABLET(S): at 14:27

## 2017-05-23 RX ADMIN — CAPTOPRIL 6.25 MILLIGRAM(S): 12.5 TABLET ORAL at 14:26

## 2017-05-23 RX ADMIN — Medication 100 MILLIGRAM(S): at 14:27

## 2017-05-23 RX ADMIN — Medication 100 MILLIGRAM(S): at 06:15

## 2017-05-23 RX ADMIN — Medication 100 MILLIGRAM(S): at 23:01

## 2017-05-23 RX ADMIN — HEPARIN SODIUM 5000 UNIT(S): 5000 INJECTION INTRAVENOUS; SUBCUTANEOUS at 06:15

## 2017-05-23 RX ADMIN — CAPTOPRIL 12.5 MILLIGRAM(S): 12.5 TABLET ORAL at 06:15

## 2017-05-23 RX ADMIN — CHLORHEXIDINE GLUCONATE 1 APPLICATION(S): 213 SOLUTION TOPICAL at 14:16

## 2017-05-23 RX ADMIN — Medication 0.25 MILLIGRAM(S): at 18:45

## 2017-05-23 RX ADMIN — HEPARIN SODIUM 5000 UNIT(S): 5000 INJECTION INTRAVENOUS; SUBCUTANEOUS at 14:29

## 2017-05-23 RX ADMIN — Medication 1 MILLIGRAM(S): at 14:27

## 2017-05-23 RX ADMIN — Medication 40 MILLIGRAM(S): at 06:15

## 2017-05-23 RX ADMIN — CARVEDILOL PHOSPHATE 9.38 MILLIGRAM(S): 80 CAPSULE, EXTENDED RELEASE ORAL at 23:16

## 2017-05-23 RX ADMIN — HEPARIN SODIUM 19 UNIT(S)/HR: 5000 INJECTION INTRAVENOUS; SUBCUTANEOUS at 17:17

## 2017-05-23 NOTE — PROGRESS NOTE ADULT - PROBLEM SELECTOR PLAN 4
--ob/gyn on board, will continue to follow recs  - wound healing appropriately. - likely cardiorenal vs. lasix/ACEI induced  - will monitor, check urine lytes  - consider cutting back on lasix as patient is more euvolemic today.

## 2017-05-23 NOTE — PROGRESS NOTE ADULT - ASSESSMENT
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

## 2017-05-23 NOTE — PROGRESS NOTE ADULT - ATTENDING COMMENTS
Patient is 26 year old woman with peripartum cardiomyopathy. Appreciate Heart Failure Consult yesterday. Deferred RHC for now.   -will change Lasix to PO today  -decrease captopril to 6.25 mg TID to allow room with BP to get medications  -continue carvedilol 6.25 mg BID

## 2017-05-23 NOTE — PROGRESS NOTE ADULT - ATTENDING COMMENTS
Patient stable overnight. Captopril 12.5 decreased to 6.25 tid for decrease in SBP . On Coreg 6.25 bid, HR 90s at rest increasing to 110 with movement or anxiety. Lasix 40 IV given and changed to 40mg PO starting tomorrow. Dig load in progress. Exam; lieing flat no SOB. Lungs clear. S4 no murmers. No hepatomegaly no LE edema. CXR shows minimal PVC. BUN 28, Cr 1.27   Case discussed at length with Prof. Brayan Krishna re use of bromocryptine. Patient has a number of markers of risk including AA race, LVEDD>6 and LVEF < 35. While the data for use of bromocryptine is equivocal (in  guidelines but no US), he would consider its use in this patient. I discussed at length with patient who understand this would be an off label use and also understand that bromocryptine is assosciated with an increase risk of thromoboembolism. She will consider and discuss with the team.  SUGGEST:  Increase coreg 9.375 bid, continue captopril 6.25 tid, d/c aldactone for now, lasix 40 po bid from tomorrow, continue dig load.   Repeat TTE for evaluation of PAP.   Should there be any signs of clinical deterioration including worsening renal function or persistent elevation of PAP, patient should receive Obinna Brian and be moved to [facility] for further care.   James Mace

## 2017-05-23 NOTE — PROGRESS NOTE ADULT - SUBJECTIVE AND OBJECTIVE BOX
Patient is a 26y old  female w/  gestational HTN during recent pregnancy, s/p non-elective  for pre-eclampsia presents w/ complaint of SOB. Pt was found to have severe systolic dysfunction currently in CCU. Today she feels much better. No complaints of SOB. She does experience palpitations when she ambulates. HR currently at rest 80-90s and does go up higher to 115s on ambulation.       INTERVAL HPI/OVERNIGHT EVENTS:    MEDICATIONS  (STANDING):  heparin  Injectable 5000Unit(s) SubCutaneous every 8 hours  chlorhexidine 4% Liquid 1Application(s) Topical daily  multivitamin 1Tablet(s) Oral daily  folic acid 1milliGRAM(s) Oral daily  spironolactone 12.5milliGRAM(s) Oral daily  senna 2Tablet(s) Oral at bedtime  docusate sodium 100milliGRAM(s) Oral three times a day  captopril 6.25milliGRAM(s) Oral every 8 hours  carvedilol 6.25milliGRAM(s) Oral every 12 hours  digoxin     Tablet 0.25milliGRAM(s) Oral once    MEDICATIONS  (PRN):      Allergies    No Known Allergies    Intolerances      Height (cm): 167.6 (18 @ 17:30)  Weight (kg): 110 ( @ 19:00)  BMI (kg/m2): 39.2 ( @ 19:00)  BSA (m2): 2.17 (18 @ 19:00)  Vital Signs Last 24 Hrs  T(C): 36.7, Max: 36.9 (- @ 20:00)  T(F): 98, Max: 98.4 ( @ 20:00)  HR: 85 (81 - 126)  BP: 103/53 (90/39 - 152/73)  BP(mean): 65 (52 - 93)  RR: 20 (16 - 25)  SpO2: 100% (99% - 100%)  [ ] room air   [ ] 02    PHYSICAL EXAM:  GENERAL:  No acute distress, well appearing, [ ] Agitated, [ ] Lethargy, [ ] confused   HEAD:  normal  ENMT: normal  NECK:  No JVD  NERVOUS SYSTEM:  Alert & Oriented X3, no focal deficits [ ]Confusion  [ ] Encephalopathic [ ] Sedated [ ] Other  CHEST/LUNG: Clear to auscultation bilaterally,  [ ] decreased breath sounds at bases  [ ] wheezing   [ ] rhonchi  [ ] crackles  HEART:  Regular rate and rhythm, No murmurs, rubs, or gallops,  [ ] irregular   ABDOMEN:  soft, nontender, nondistended, positive bowel sounds   [ ] obese  EXTREMITIES: No clubbing, cyanosis or edema  SKIN: [ ] venous stasis skin changes    LABS:                        12.1   9.30  )-----------( 378      ( 23 May 2017 05:45 )             40.0     23 May 2017 05:45    140    |  95     |  28     ----------------------------<  93     4.1     |  24     |  1.27     Ca    10.1       23 May 2017 05:45  Phos  5.0       23 May 2017 05:45  Mg     2.3       23 May 2017 05:45    TPro  9.0    /  Alb  4.4    /  TBili  0.8    /  DBili  x      /  AST  29     /  ALT  35     /  AlkPhos  100    23 May 2017 05:45      Hemoglobin A1C, Whole Blood: 5.9 % ( @ 06:10)      CAPILLARY BLOOD GLUCOSE  107 (22 May 2017 15:00)    Cultures    RADIOLOGY & ADDITIONAL TESTS:      Care Discussed with [X] Consultants  [ ] Patient  [ ] Family  [X]   /   [ ] Other; RN  DVT prophylaxis [ ] lovenox   [ ] subq heparin  [ ] coumadin  [ ] venodynes [ ] ambulating frequently at how risk for vte and no pharm         or  mechanical prophylaxis required    [ ] other   Advanced directive:    [ ]pt has hcp     [ ] pt declined to assign hcp  Discussed with pt @ bedside

## 2017-05-23 NOTE — PROGRESS NOTE ADULT - SUBJECTIVE AND OBJECTIVE BOX
S: Patient seen and examined with MFM attending.  Patient has no acute complaints.  Reports SOB when ambulating to commode.  However, denies any SOB at rest.  Denies chest pain, palpitations, dizziness, N/V, abdominal pain, lower extremity edema.  Tolerating regular diet.      O: VS: T(C): 36.7, Max: 36.9 (05-22 @ 20:00)  HR: 85 (81 - 126)  BP: 103/53 (90/39 - 152/73)  RR: 20 (16 - 25)  SpO2: 100% (99% - 100%)  I/Os:  I & Os for current day (as of 05-23 @ 12:33)  =============================================  IN: 625 ml / OUT: 1400 ml / NET: -775 ml    Gen: NAD  CV: Tachycardic.  Normal S1 and S2.  Pulm: CTAB  Abd: Soft, NT, ND.  +bowel sounds  Ext: NT bilaterally    Meds: heparin  Injectable 5000Unit(s) SubCutaneous every 8 hours  chlorhexidine 4% Liquid 1Application(s) Topical daily  multivitamin 1Tablet(s) Oral daily  folic acid 1milliGRAM(s) Oral daily  spironolactone 12.5milliGRAM(s) Oral daily  senna 2Tablet(s) Oral at bedtime  docusate sodium 100milliGRAM(s) Oral three times a day  captopril 6.25milliGRAM(s) Oral every 8 hours  carvedilol 6.25milliGRAM(s) Oral every 12 hours  digoxin     Tablet 0.25milliGRAM(s) Oral once      Labs:    Blood type: O Positive  Rubella IgG: RPR: Negative                          12.1   9.30 >-----------< 378    ( 05-23 @ 05:45 )             40.0                        12.8   8.07 >-----------< 383    ( 05-22 @ 05:35 )             41.4                        12.0   7.79 >-----------< 399    ( 05-21 @ 06:00 )             39.3    05-23-17 @ 05:45      140  |  95<L>  |  28<H>  ----------------------------<  93  4.1   |  24  |  1.27    05-22-17 @ 05:35      142  |  99  |  20  ----------------------------<  93  3.9   |  24  |  0.86    05-21-17 @ 06:30      142  |  102  |  19  ----------------------------<  81  3.8   |  22  |  0.91      TPro  9.0<H>  /  Alb  4.4  /  TBili  0.8  /  DBili  x   /  AST  29  /  ALT  35<H>  /  AlkPhos  100  05-23-17 @ 05:45  TPro  8.7<H>  /  Alb  4.2  /  TBili  0.8  /  DBili  x   /  AST  26  /  ALT  30  /  AlkPhos  94  05-21-17 @ 06:30  TPro  8.8<H>  /  Alb  x   /  TBili  x   /  DBili  x   /  AST  x   /  ALT  x   /  AlkPhos  x   05-21-17 @ 06:00

## 2017-05-23 NOTE — PROGRESS NOTE ADULT - PROBLEM SELECTOR PLAN 3
--initially resolved, now some recurrence of chest tightness/pressure. Does not appear ischemic  --may be related to pulmonary edema, pulmonary exam limited 2/2 body habitus, consider CXR

## 2017-05-23 NOTE — PROGRESS NOTE ADULT - SUBJECTIVE AND OBJECTIVE BOX
Date of Admission: May 18, 2017    24H hour events: Still tachycardic to 120s o/n especially with walking around. Low BPs o/n 92/40. Pt reports feeling better    Vital Signs Last 24 Hrs  T(C): 36.7, Max: 36.9 (05-22 @ 20:00)  T(F): 98, Max: 98.4 (05-22 @ 20:00)  HR: 84 (81 - 126)  BP: 98/54 (90/39 - 152/73)  BP(mean): 64 (52 - 93)  RR: 25 (16 - 25)  SpO2: 100% (98% - 100%)  I&O's Summary    I & Os for current day (as of 23 May 2017 08:15)  =============================================  IN: 625 ml / OUT: 1400 ml / NET: -775 ml      MEDICATIONS:  heparin  Injectable 5000Unit(s) SubCutaneous every 8 hours  spironolactone 12.5milliGRAM(s) Oral daily  furosemide   Injectable 40milliGRAM(s) IV Push every 12 hours  captopril 12.5milliGRAM(s) Oral every 8 hours  carvedilol 6.25milliGRAM(s) Oral every 12 hours  digoxin     Tablet 0.25milliGRAM(s) Oral once          senna 2Tablet(s) Oral at bedtime  docusate sodium 100milliGRAM(s) Oral three times a day      chlorhexidine 4% Liquid 1Application(s) Topical daily  multivitamin 1Tablet(s) Oral daily  folic acid 1milliGRAM(s) Oral daily      REVIEW OF SYSTEMS:  Complete 10point ROS negative.    PHYSICAL EXAM:  Appearance: Normal	  HEENT:   Normal oral mucosa, PERRL, EOMI	  Lymphatic: No lymphadenopathy  Cardiovascular: Normal S1 S2, No JVD, No murmurs, No edema  Respiratory: Lungs clear to auscultation	  Psychiatry: A & O x 3, Mood & affect appropriate  Gastrointestinal:  Soft, Non-tender, + BS	  Skin: No rashes, No ecchymoses, No cyanosis	  Neurologic: Non-focal  Extremities: Normal range of motion, No clubbing, cyanosis or edema  Vascular: Peripheral pulses palpable 2+ bilaterally        LABS:	 	    CBC Full  -  ( 23 May 2017 05:45 )  WBC Count : 9.30 K/uL  Hemoglobin : 12.1 g/dL  Hematocrit : 40.0 %  Platelet Count - Automated : 378 K/uL  Mean Cell Volume : 79.1 fL  Mean Cell Hemoglobin : 23.9 pg  Mean Cell Hemoglobin Concentration : 30.3 %  Auto Neutrophil # : x  Auto Lymphocyte # : x  Auto Monocyte # : x  Auto Eosinophil # : x  Auto Basophil # : x  Auto Neutrophil % : x  Auto Lymphocyte % : x  Auto Monocyte % : x  Auto Eosinophil % : x  Auto Basophil % : x    05-23    140  |  95<L>  |  28<H>  ----------------------------<  93  4.1   |  24  |  1.27  05-22    142  |  99  |  20  ----------------------------<  93  3.9   |  24  |  0.86    Ca    10.1      23 May 2017 05:45  Ca    10.0      22 May 2017 05:35  Phos  5.0     05-23  Phos  4.1     05-22  Mg     2.3     05-23  Mg     2.1     05-22    TPro  9.0<H>  /  Alb  4.4  /  TBili  0.8  /  DBili  x   /  AST  29  /  ALT  35<H>  /  AlkPhos  100  05-23      proBNP: Serum Pro-Brain Natriuretic Peptide: 1304 pg/mL (05-21 @ 06:30)  Serum Pro-Brain Natriuretic Peptide: 5728 pg/mL (05-18 @ 12:46)    Lipid Profile:   HgA1c:   TSH:       CARDIAC MARKERS:            TELEMETRY: 	    ECG:  	  RADIOLOGY:  ECHO:  OTHER: 	    PREVIOUS DIAGNOSTIC TESTING:    [ ] Echocardiogram:  [ ]  Catheterization:  [ ] Stress Test:  	  	  ASSESSMENT/PLAN: 	    [place] [id number] Date of Admission: May 18, 2017    24H hour events: Still tachycardic to 120s o/n especially with walking around. Low BPs o/n 92/40. Pt reports feeling better with no chest discomfort at this time. Pt does report some L-sided chest discomfort/pain o/n for a few hours that has resolved. Patient denies abd pain, f/c, weakness/numbness, or headache. Pt is urinating less, and not eating well because she didn't like the food. She had a BM yesterday. Pt denies any dizziness.     Vital Signs Last 24 Hrs  T(C): 36.7, Max: 36.9 (05-22 @ 20:00)  T(F): 98, Max: 98.4 (05-22 @ 20:00)  HR: 84 (81 - 126)  BP: 98/54 (90/39 - 152/73)  BP(mean): 64 (52 - 93)  RR: 25 (16 - 25)  SpO2: 100% (98% - 100%)  I&O's Summary    I & Os for current day (as of 23 May 2017 08:15)  =============================================  IN: 625 ml / OUT: 1400 ml / NET: -775 ml      MEDICATIONS:  heparin  Injectable 5000Unit(s) SubCutaneous every 8 hours  spironolactone 12.5milliGRAM(s) Oral daily  furosemide   Injectable 40milliGRAM(s) IV Push every 12 hours  captopril 12.5milliGRAM(s) Oral every 8 hours  carvedilol 6.25milliGRAM(s) Oral every 12 hours  digoxin     Tablet 0.25milliGRAM(s) Oral once  senna 2Tablet(s) Oral at bedtime  docusate sodium 100milliGRAM(s) Oral three times a day  chlorhexidine 4% Liquid 1Application(s) Topical daily  multivitamin 1Tablet(s) Oral daily  folic acid 1milliGRAM(s) Oral daily    PHYSICAL EXAM:  Appearance: Normal, NAD	  HEENT:   Normal oral mucosa, PERRL, EOMI	  Cardiovascular: Normal S1 S2, mild JVD, No murmurs, No edema  Respiratory: Lungs clear to auscultation, limited exam due to body habitus	  Psychiatry: AAO, Mood & affect appropriate  Gastrointestinal:  Soft, Non-tender, + BS,  wound c/d/i	  Neurologic: 5/5 strength in all extremity  Extremities: Normal range of motion, No clubbing    LABS:	 	    CBC Full  -  ( 23 May 2017 05:45 )  WBC Count : 9.30 K/uL  Hemoglobin : 12.1 g/dL  Hematocrit : 40.0 %  Platelet Count - Automated : 378 K/uL  Mean Cell Volume : 79.1 fL  Mean Cell Hemoglobin : 23.9 pg  Mean Cell Hemoglobin Concentration : 30.3 %  Auto Neutrophil # : x  Auto Lymphocyte # : x  Auto Monocyte # : x  Auto Eosinophil # : x  Auto Basophil # : x  Auto Neutrophil % : x  Auto Lymphocyte % : x  Auto Monocyte % : x  Auto Eosinophil % : x  Auto Basophil % : x        140  |  95<L>  |  28<H>  ----------------------------<  93  4.1   |  24  |  1.27      142  |  99  |  20  ----------------------------<  93  3.9   |  24  |  0.86    Ca    10.1      23 May 2017 05:45  Ca    10.0      22 May 2017 05:35  Phos  5.0       Phos  4.1       Mg     2.3       Mg     2.1     -22    TPro  9.0<H>  /  Alb  4.4  /  TBili  0.8  /  DBili  x   /  AST  29  /  ALT  35<H>  /  AlkPhos  100  05      proBNP: Serum Pro-Brain Natriuretic Peptide: 1304 pg/mL ( @ 06:30)  Serum Pro-Brain Natriuretic Peptide: 5728 pg/mL ( @ 12:46)    TELEMETRY: HR to 120s	    	  Mono Peralta MD PGY1

## 2017-05-23 NOTE — PROGRESS NOTE ADULT - PROBLEM SELECTOR PLAN 1
--patient p/w symptoms concerning for CHF with dyspnea on exertion, orthopnea and palpitations in the setting of recently delivery for pre-eclampsia, a statistically significant risk factor for PPCM  --evidence on TTE of severe global systolic LV dysfunction with EF 24%, also mild RV systolic dysfunction concerning for right-sided HF 2/2 progressive left-sided HF. Severe pulmonary HTN likely class II 2/2 CHF.  --unlikely ischemic CM given presenting EKG and negative CE on admission  --symptoms and pulmonary exam improved with Lasix diuresis and normalization of BPs, though remains symptomatic   --currently treating CHF captopril 5mg qd, Lasix 40mg IV BID (changed from PO to increase diuresis), digoxin load, Aldactone 12.5mg, and coreg 6.25 BID.  Patient is not breastfeeding, does not need to be considered in pharmacotherapy  --prognosis unclear, patient will need close cardiologist follow up after discharge to continually assess cardiac function.   --goal diuresis net (-) 1-1.5L, diuresed 775mL out. However, pt is clinically euvolemic. Consider decreasing lasix, especially with increased Cr.   --ob/gyn on board, appreciate recs.  - f/u heart failure, consider R heart cath to assess fluid status

## 2017-05-23 NOTE — PROGRESS NOTE ADULT - PROBLEM SELECTOR PLAN 2
--patient with history of post partum HTN on outpatient labetalol - unclear if dx truly PPHTN vs HTN as manifestation of developing PPCM  --presented with hypertensive urgency, max pressures 190s/120s with evidence of pulmonary edema on CXR, though less likely end-organ damage 2/2 HTN and more likely related to systolic dysfunction  --s/p nitro gtt on admission to CCU now d/c'd, pressures have remained normal/hypotensive on PO meds alone

## 2017-05-23 NOTE — PROGRESS NOTE ADULT - ASSESSMENT
Patient is a 26y old  female w/  gestational HTN during recent pregnancy, s/p non-elective  for pre-eclampsia presents w/ complaint of SOB. Pt was found to have severe systolic dysfunction currently in CCU. Today she feels much better. HR better controlled. Patient is a 26y old  female w/  gestational HTN during recent pregnancy, s/p non-elective  for pre-eclampsia presents w/ complaint of SOB. Pt was found to have severe systolic dysfunction currently in CCU. Today she feels much better. HR better controlled. - Continue Coreg 6.25 mg po BID, agree with decrease in captopril to 6.25 mg TID. Hold Lasix IV for now. Patient is a 26y old  female w/  gestational HTN during recent pregnancy, s/p non-elective  for pre-eclampsia presents w/ complaint of SOB. 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 Today she feels much better. HR better controlled at rest, but gets tachycardic with ambulation.   Continue Coreg 6.25 mg po BID, agree with decrease in captopril to 6.25 mg TID, and up-titrate if SBP allows. Hold Lasix IV for now.   Will get repeat TTE today.  Strict I/O please.

## 2017-05-23 NOTE — PROGRESS NOTE ADULT - PROBLEM SELECTOR PLAN 1
- CV: BPs wnl.  Postpartum Cardiomyopathy / CHF: Continue Digoxin 0.125mg daily, Lasix 40mg daily, Captopril 6.25mg q8hrs, Coreg 6.25mg BID.    - Pulm: O2 sats stable on room air.    - GI: Regular diet  - : voiding spontaneously.  Monitor I/Os.   - Heme: DVT prophylaxis: HSQ daily, venodynes  - Appreciate CCU care - CV: BPs wnl.  Postpartum Cardiomyopathy / CHF: Continue Digoxin 0.125mg daily, Lasix 40mg daily, Captopril 6.25mg q8hrs, Coreg 6.25mg BID.    - Pulm: O2 sats stable on room air.    - GI: Regular diet  - : Acute PEDRO: cardiorenal vs. drug induced.  Continue to monitor I/Os closely.  Repeat BMP.  Voiding spontaneously.   - Heme: DVT prophylaxis: HSQ daily, venodynes  - Appreciate CCU care  - Pt desires contraception and discussed IUD with Dr. Ortega.

## 2017-05-24 LAB
ALBUMIN SERPL ELPH-MCNC: 4.3 G/DL — SIGNIFICANT CHANGE UP (ref 3.3–5)
ALP SERPL-CCNC: 99 U/L — SIGNIFICANT CHANGE UP (ref 40–120)
ALT FLD-CCNC: 40 U/L — HIGH (ref 4–33)
APTT BLD: 131.9 SEC — CRITICAL HIGH (ref 27.5–37.4)
APTT BLD: 72.8 SEC — HIGH (ref 27.5–37.4)
APTT BLD: > 200 SEC — CRITICAL HIGH (ref 27.5–37.4)
AST SERPL-CCNC: 34 U/L — HIGH (ref 4–32)
BILIRUB SERPL-MCNC: 0.6 MG/DL — SIGNIFICANT CHANGE UP (ref 0.2–1.2)
BUN SERPL-MCNC: 28 MG/DL — HIGH (ref 7–23)
CALCIUM SERPL-MCNC: 9.8 MG/DL — SIGNIFICANT CHANGE UP (ref 8.4–10.5)
CHLORIDE SERPL-SCNC: 99 MMOL/L — SIGNIFICANT CHANGE UP (ref 98–107)
CO2 SERPL-SCNC: 23 MMOL/L — SIGNIFICANT CHANGE UP (ref 22–31)
CREAT ?TM UR-MCNC: 175.8 MG/DL — SIGNIFICANT CHANGE UP
CREAT SERPL-MCNC: 1.04 MG/DL — SIGNIFICANT CHANGE UP (ref 0.5–1.3)
GLUCOSE SERPL-MCNC: 99 MG/DL — SIGNIFICANT CHANGE UP (ref 70–99)
HCT VFR BLD CALC: 38.8 % — SIGNIFICANT CHANGE UP (ref 34.5–45)
HGB BLD-MCNC: 11.8 G/DL — SIGNIFICANT CHANGE UP (ref 11.5–15.5)
MAGNESIUM SERPL-MCNC: 2.4 MG/DL — SIGNIFICANT CHANGE UP (ref 1.6–2.6)
MCHC RBC-ENTMCNC: 23.8 PG — LOW (ref 27–34)
MCHC RBC-ENTMCNC: 30.4 % — LOW (ref 32–36)
MCV RBC AUTO: 78.4 FL — LOW (ref 80–100)
PHOSPHATE SERPL-MCNC: 4.4 MG/DL — SIGNIFICANT CHANGE UP (ref 2.5–4.5)
PLATELET # BLD AUTO: 350 K/UL — SIGNIFICANT CHANGE UP (ref 150–400)
PMV BLD: 10.3 FL — SIGNIFICANT CHANGE UP (ref 7–13)
POTASSIUM SERPL-MCNC: 4.1 MMOL/L — SIGNIFICANT CHANGE UP (ref 3.5–5.3)
POTASSIUM SERPL-SCNC: 4.1 MMOL/L — SIGNIFICANT CHANGE UP (ref 3.5–5.3)
PROT SERPL-MCNC: 9 G/DL — HIGH (ref 6–8.3)
RBC # BLD: 4.95 M/UL — SIGNIFICANT CHANGE UP (ref 3.8–5.2)
RBC # FLD: 16.7 % — HIGH (ref 10.3–14.5)
SODIUM SERPL-SCNC: 139 MMOL/L — SIGNIFICANT CHANGE UP (ref 135–145)
WBC # BLD: 7.64 K/UL — SIGNIFICANT CHANGE UP (ref 3.8–10.5)
WBC # FLD AUTO: 7.64 K/UL — SIGNIFICANT CHANGE UP (ref 3.8–10.5)

## 2017-05-24 PROCEDURE — 99233 SBSQ HOSP IP/OBS HIGH 50: CPT

## 2017-05-24 PROCEDURE — 99222 1ST HOSP IP/OBS MODERATE 55: CPT

## 2017-05-24 PROCEDURE — 93010 ELECTROCARDIOGRAM REPORT: CPT

## 2017-05-24 RX ORDER — LISINOPRIL 2.5 MG/1
7.5 TABLET ORAL DAILY
Qty: 0 | Refills: 0 | Status: DISCONTINUED | OUTPATIENT
Start: 2017-05-24 | End: 2017-05-25

## 2017-05-24 RX ORDER — HEPARIN SODIUM 5000 [USP'U]/ML
1500 INJECTION INTRAVENOUS; SUBCUTANEOUS
Qty: 25000 | Refills: 0 | Status: DISCONTINUED | OUTPATIENT
Start: 2017-05-24 | End: 2017-05-24

## 2017-05-24 RX ORDER — HEPARIN SODIUM 5000 [USP'U]/ML
15 INJECTION INTRAVENOUS; SUBCUTANEOUS
Qty: 25000 | Refills: 0 | Status: DISCONTINUED | OUTPATIENT
Start: 2017-05-24 | End: 2017-05-24

## 2017-05-24 RX ORDER — HEPARIN SODIUM 5000 [USP'U]/ML
1200 INJECTION INTRAVENOUS; SUBCUTANEOUS
Qty: 25000 | Refills: 0 | Status: DISCONTINUED | OUTPATIENT
Start: 2017-05-24 | End: 2017-05-27

## 2017-05-24 RX ORDER — LISINOPRIL 2.5 MG/1
7.5 TABLET ORAL DAILY
Qty: 0 | Refills: 0 | Status: DISCONTINUED | OUTPATIENT
Start: 2017-05-24 | End: 2017-05-24

## 2017-05-24 RX ADMIN — CARVEDILOL PHOSPHATE 9.38 MILLIGRAM(S): 80 CAPSULE, EXTENDED RELEASE ORAL at 08:11

## 2017-05-24 RX ADMIN — Medication 40 MILLIGRAM(S): at 06:20

## 2017-05-24 RX ADMIN — CHLORHEXIDINE GLUCONATE 1 APPLICATION(S): 213 SOLUTION TOPICAL at 14:34

## 2017-05-24 RX ADMIN — Medication 7.5 MILLIGRAM(S): at 22:12

## 2017-05-24 RX ADMIN — Medication 0.12 MILLIGRAM(S): at 06:20

## 2017-05-24 RX ADMIN — HEPARIN SODIUM 15 UNIT(S)/HR: 5000 INJECTION INTRAVENOUS; SUBCUTANEOUS at 09:40

## 2017-05-24 RX ADMIN — Medication 1 MILLIGRAM(S): at 11:56

## 2017-05-24 RX ADMIN — Medication 100 MILLIGRAM(S): at 14:34

## 2017-05-24 RX ADMIN — SENNA PLUS 2 TABLET(S): 8.6 TABLET ORAL at 22:12

## 2017-05-24 RX ADMIN — Medication 100 MILLIGRAM(S): at 22:12

## 2017-05-24 RX ADMIN — HEPARIN SODIUM 12 UNIT(S)/HR: 5000 INJECTION INTRAVENOUS; SUBCUTANEOUS at 17:02

## 2017-05-24 RX ADMIN — CARVEDILOL PHOSPHATE 9.38 MILLIGRAM(S): 80 CAPSULE, EXTENDED RELEASE ORAL at 20:31

## 2017-05-24 RX ADMIN — HEPARIN SODIUM 16 UNIT(S)/HR: 5000 INJECTION INTRAVENOUS; SUBCUTANEOUS at 06:21

## 2017-05-24 RX ADMIN — CAPTOPRIL 6.25 MILLIGRAM(S): 12.5 TABLET ORAL at 06:20

## 2017-05-24 RX ADMIN — CAPTOPRIL 6.25 MILLIGRAM(S): 12.5 TABLET ORAL at 14:34

## 2017-05-24 RX ADMIN — Medication 100 MILLIGRAM(S): at 06:20

## 2017-05-24 RX ADMIN — Medication 1 TABLET(S): at 11:56

## 2017-05-24 NOTE — PROGRESS NOTE ADULT - PROBLEM SELECTOR PLAN 1
--patient p/w symptoms concerning for CHF with dyspnea on exertion, orthopnea and palpitations in the setting of recently delivery for pre-eclampsia, a statistically significant risk factor for PPCM  --evidence on TTE of severe global systolic LV dysfunction with EF 24%, also mild RV systolic dysfunction concerning for right-sided HF 2/2 progressive left-sided HF. Severe pulmonary HTN likely class II 2/2 CHF. Repeat TTE showed stable EF but improved pulmonary HTN.   --unlikely ischemic CM given presenting EKG and negative CE on admission  --symptoms and pulmonary exam improved with Lasix diuresis and normalization of BPs, though remains symptomatic   - consider Ivabradine and/or bromocriptine. c/w hep gtt   --currently treating CHF captopril 6.25mg q8hrs, Lasix 40mg PO qday, coreg, digoxin.  Patient is not breastfeeding, does not need to be considered in pharmacotherapy  --prognosis unclear, patient will need close cardiologist follow up after discharge to continually assess cardiac function.   --goal diuresis net (-) 1-1.5L, diuresed 132mL out. However, pt is clinically euvolemic. Consider decreasing lasix, especially with increased Cr.   --ob/gyn on board, appreciate recs.  - f/u heart failure, consider R heart cath to assess fluid status --patient p/w symptoms concerning for CHF with dyspnea on exertion, orthopnea and palpitations in the setting of recently delivery for pre-eclampsia, a statistically significant risk factor for PPCM  --evidence on TTE of severe global systolic LV dysfunction with EF 24%, also mild RV systolic dysfunction concerning for right-sided HF 2/2 progressive left-sided HF. Severe pulmonary HTN likely class II 2/2 CHF. Repeat TTE showed stable EF but improved pulmonary HTN.   --unlikely ischemic CM given presenting EKG and negative CE on admission  --symptoms and pulmonary exam improved with Lasix diuresis and normalization of BPs, though remains symptomatic   - consider Ivabradine and/or bromocriptine. c/w hep gtt   --currently treating CHF captopril 6.25mg q8hrs, Lasix 40mg PO qday, coreg, digoxin.  Patient is not breastfeeding, does not need to be considered in pharmacotherapy  --prognosis unclear, patient will need close cardiologist follow up after discharge to continually assess cardiac function.   - However, pt is clinically euvolemic or slightly hypovolemic.   --ob/gyn on board, appreciate recs.  - f/u heart failure, consider R heart cath to assess fluid status

## 2017-05-24 NOTE — PROGRESS NOTE ADULT - ATTENDING COMMENTS
Good progress overnight.  HR 80-90, -90,  BUN 28, Cr1.0  I/O: 300 3 kg weight loss  TTE yesterday; LVEDd 5.9, LVEF 25, mild MR, PAP 43  Coreg 9.375 BID, CPT 6.25 TID, Dig 0.125, Lasix 40 QD. Good progress overnight.  HR 80-90, -90,  BUN 28, Cr1.0  I/O: 300 3 kg weight loss  TTE yesterday; mild LV enlargement, LVEDd 5.9, LVEF 25, mild MR, PAP 43, RV appears normal not well visualized.   Coreg 9.375 BID, CPT 6.25 TID, Dig 0.125, Lasix 40 QD. Good progress overnight.  HR 80-90, -90,  BUN 28, Cr1.0  I/O: 300,  3 kg weight loss  TTE yesterday; mild LV enlargement, LVEDd 5.9, LVEF 25, mild MR, PAP 43, RV appears normal not well visualized.   Coreg 9.375 BID, CPT 6.25 TID, Dig 0.125, Lasix 40 QD.  Exam well compensated. lungs clear, abdo soft no liver, no LE edema.  Imp: stable. improved PAP on echo. HR decreasing. tolerating low doses of meds. Patient does not favor off label use of bromocryptine.  Suggest: Change CPT to Lisinopril 7.5 bid. Start coumadin tomorrow. Will need life vest on d/c. May go to step down tomorrow.  James Mace

## 2017-05-24 NOTE — PROGRESS NOTE ADULT - SUBJECTIVE AND OBJECTIVE BOX
Patient is a 26y old  female w/  gestational HTN during recent pregnancy, s/p non-elective  for pre-eclampsia presents w/ complaint of SOB. Pt was found to have severe systolic dysfunction currently in CCU. Today she feels much better. No complaints of SOB. She has not ambulated since yesterday to assess dyspnea, will do so later. No orthopnea, PND, CP or palpitations.       INTERVAL HPI/OVERNIGHT EVENTS:    MEDICATIONS  (STANDING):  chlorhexidine 4% Liquid 1Application(s) Topical daily  multivitamin 1Tablet(s) Oral daily  folic acid 1milliGRAM(s) Oral daily  senna 2Tablet(s) Oral at bedtime  docusate sodium 100milliGRAM(s) Oral three times a day  digoxin     Tablet 0.125milliGRAM(s) Oral daily  captopril 6.25milliGRAM(s) Oral every 8 hours  furosemide    Tablet 40milliGRAM(s) Oral daily  carvedilol 9.375milliGRAM(s) Oral every 12 hours  heparin  Infusion 1500Unit(s)/Hr IV Continuous <Continuous>    MEDICATIONS  (PRN):      Allergies    No Known Allergies    Intolerances    Height (cm): 167.6 (18 @ 17:30)  Weight (kg): 110 ( @ 19:00)  BMI (kg/m2): 39.2 ( @ 19:00)  BSA (m2): 2.17 (18 @ 19:00)  Vital Signs Last 24 Hrs  T(C): 36.7, Max: 37.1 (-23 @ 20:00)  T(F): 98, Max: 98.7 ( @ 20:00)  HR: 90 (71 - 107)  BP: 116/57 (89/44 - 142/72)  BP(mean): 72 (49 - 93)  RR: 21 (18 - 28)  SpO2: 99% (96% - 100%)  [ ] room air   [ ] 02    PHYSICAL EXAM:  GENERAL:  No acute distresss, well appearing, [ ] Agitated, [ ] Lethargy, [ ] confused   HEAD:  normal  ENMT: normal  NECK:  No JVD   NERVOUS SYSTEM:  Alert & Oriented X3, no focal deficits [ ]Confusion  [ ] Encephalopathic [ ] Sedated [ ] Other  CHEST/LUNG: Clear to auscultation bilaterally,  [ ] decreased breath sounds at bases  [ ] wheezing   [ ] rhonchi  [ ] crackles  HEART:  Regular rate and rhythm, No murmurs, rubs, or gallops,  [ ] irregular   ABDOMEN:  soft, nontender, nondistended, positive bowel sounds   [ ] obese  EXTREMITIES: No clubbing, cyanosis or edema  SKIN: [ ] venous stasis skin changes    LABS:                        11.8   7.64  )-----------( 350      ( 24 May 2017 06:30 )             38.8     24 May 2017 06:30    139    |  99     |  28     ----------------------------<  99     4.1     |  23     |  1.04     Ca    9.8        24 May 2017 06:30  Phos  4.4       24 May 2017 06:30  Mg     2.4       24 May 2017 06:30    TPro  9.0    /  Alb  4.3    /  TBili  0.6    /  DBili  x      /  AST  34     /  ALT  40     /  AlkPhos  99     24 May 2017 06:30

## 2017-05-24 NOTE — PROGRESS NOTE ADULT - SUBJECTIVE AND OBJECTIVE BOX
24H hour events: HR in high 60s overnight but with walking, still to 110s. Still a bit of chest pressure but no CP. Still a bit of SOB with walking. No HA, no numbness/weakness, no abd pain, Urinating okay. No bleeding from anywhere.     Vital Signs Last 24 Hrs  T(C): 36.5, Max: 37.1 (05-23 @ 20:00)  T(F): 97.7, Max: 98.7 (05-23 @ 20:00)  HR: 71 (71 - 107)  BP: 105/89 (91/45 - 142/72)  BP(mean): 93 (49 - 93)  RR: 19 (19 - 28)  SpO2: 98% (96% - 100%)  I&O's Summary    I & Os for current day (as of 24 May 2017 08:15)  =============================================  IN: 532 ml / OUT: 400 ml / NET: 132 ml      MEDICATIONS:  digoxin     Tablet 0.125milliGRAM(s) Oral daily  captopril 6.25milliGRAM(s) Oral every 8 hours  furosemide    Tablet 40milliGRAM(s) Oral daily  carvedilol 9.375milliGRAM(s) Oral every 12 hours  heparin  Infusion 1900Unit(s)/Hr IV Continuous <Continuous>  senna 2Tablet(s) Oral at bedtime  docusate sodium 100milliGRAM(s) Oral three times a day  chlorhexidine 4% Liquid 1Application(s) Topical daily  multivitamin 1Tablet(s) Oral daily  folic acid 1milliGRAM(s) Oral daily      PHYSICAL EXAM:  Appearance: Normal, NAD	  HEENT:   mildly dry oral mucosa, pupils reactive b/l	  Cardiovascular: Normal S1 S2, No JVD, No murmurs, No edema  Respiratory: Lungs clear to auscultation, still decreased airflow in lower extremities	  Psychiatry: AAO, Mood & affect appropriate  Gastrointestinal:  Soft, Non-tender, + BS, nd  Neurologic: 5/5 strength in all extremities  Extremities: no edema in lower extremities. Moving all 4 limbs        LABS:	 	    CBC Full  -  ( 24 May 2017 06:30 )  WBC Count : 7.64 K/uL  Hemoglobin : 11.8 g/dL  Hematocrit : 38.8 %  Platelet Count - Automated : 350 K/uL  Mean Cell Volume : 78.4 fL  Mean Cell Hemoglobin : 23.8 pg  Mean Cell Hemoglobin Concentration : 30.4 %  Auto Neutrophil # : x  Auto Lymphocyte # : x  Auto Monocyte # : x  Auto Eosinophil # : x  Auto Basophil # : x  Auto Neutrophil % : x  Auto Lymphocyte % : x  Auto Monocyte % : x  Auto Eosinophil % : x  Auto Basophil % : x    05-24    139  |  99  |  28<H>  ----------------------------<  99  4.1   |  23  |  1.04  05-23    137  |  100  |  31<H>  ----------------------------<  112<H>  4.2   |  22  |  1.29    Ca    9.8      24 May 2017 06:30  Ca    9.8      23 May 2017 16:10  Phos  4.4     05-24  Phos  4.6     05-23  Mg     2.4     05-24  Mg     2.3     05-23    TPro  9.0<H>  /  Alb  4.3  /  TBili  0.6  /  DBili  x   /  AST  34<H>  /  ALT  40<H>  /  AlkPhos  99  05-24  TPro  9.0<H>  /  Alb  4.4  /  TBili  0.8  /  DBili  x   /  AST  29  /  ALT  35<H>  /  AlkPhos  100  05-23      proBNP: Serum Pro-Brain Natriuretic Peptide: 1304 pg/mL (05-21 @ 06:30)    Lipid Profile:   HgA1c:   TSH:       CARDIAC MARKERS:            TELEMETRY: 	    ECG:  	  RADIOLOGY:  ECHO:  OTHER: 	    PREVIOUS DIAGNOSTIC TESTING:    [ ] Echocardiogram:  [ ]  Catheterization:  [ ] Stress Test:  	    Mono MICHELLE MD 24H hour events: HR in high 60s overnight but with walking, still to 110s. Still a bit of chest pressure but no CP. Still a bit of SOB with walking. No HA, no numbness/weakness, no abd pain, Urinating okay. No bleeding from anywhere.     Vital Signs Last 24 Hrs  T(C): 36.5, Max: 37.1 (05-23 @ 20:00)  T(F): 97.7, Max: 98.7 (05-23 @ 20:00)  HR: 71 (71 - 107)  BP: 105/89 (91/45 - 142/72)  BP(mean): 93 (49 - 93)  RR: 19 (19 - 28)  SpO2: 98% (96% - 100%)  I&O's Summary    I & Os for current day (as of 24 May 2017 08:15)  =============================================  IN: 532 ml / OUT: 400 ml / NET: 132 ml      MEDICATIONS:  digoxin     Tablet 0.125milliGRAM(s) Oral daily  captopril 6.25milliGRAM(s) Oral every 8 hours  furosemide    Tablet 40milliGRAM(s) Oral daily  carvedilol 9.375milliGRAM(s) Oral every 12 hours  heparin  Infusion 1900Unit(s)/Hr IV Continuous <Continuous>  senna 2Tablet(s) Oral at bedtime  docusate sodium 100milliGRAM(s) Oral three times a day  chlorhexidine 4% Liquid 1Application(s) Topical daily  multivitamin 1Tablet(s) Oral daily  folic acid 1milliGRAM(s) Oral daily      PHYSICAL EXAM:  Appearance: Normal, NAD	  HEENT:   mildly dry oral mucosa, pupils reactive b/l	  Cardiovascular: Normal S1 S2, No JVD, No murmurs, No edema  Respiratory: Lungs clear to auscultation, still decreased airflow in lower bases	  Psychiatry: AAO, Mood & affect appropriate  Gastrointestinal:  Soft, Non-tender, + BS, nd  Neurologic: 5/5 strength in all extremities  Extremities: no edema in lower extremities. Moving all 4 limbs        LABS:	 	    CBC Full  -  ( 24 May 2017 06:30 )  WBC Count : 7.64 K/uL  Hemoglobin : 11.8 g/dL  Hematocrit : 38.8 %  Platelet Count - Automated : 350 K/uL  Mean Cell Volume : 78.4 fL  Mean Cell Hemoglobin : 23.8 pg  Mean Cell Hemoglobin Concentration : 30.4 %  Auto Neutrophil # : x  Auto Lymphocyte # : x  Auto Monocyte # : x  Auto Eosinophil # : x  Auto Basophil # : x  Auto Neutrophil % : x  Auto Lymphocyte % : x  Auto Monocyte % : x  Auto Eosinophil % : x  Auto Basophil % : x    05-24    139  |  99  |  28<H>  ----------------------------<  99  4.1   |  23  |  1.04  05-23    137  |  100  |  31<H>  ----------------------------<  112<H>  4.2   |  22  |  1.29    Ca    9.8      24 May 2017 06:30  Ca    9.8      23 May 2017 16:10  Phos  4.4     05-24  Phos  4.6     05-23  Mg     2.4     05-24  Mg     2.3     05-23    TPro  9.0<H>  /  Alb  4.3  /  TBili  0.6  /  DBili  x   /  AST  34<H>  /  ALT  40<H>  /  AlkPhos  99  05-24  TPro  9.0<H>  /  Alb  4.4  /  TBili  0.8  /  DBili  x   /  AST  29  /  ALT  35<H>  /  AlkPhos  100  05-23      proBNP: Serum Pro-Brain Natriuretic Peptide: 1304 pg/mL (05-21 @ 06:30)      TELEMETRY: HRs to 110s at times, HR to high 60s at other times. 	  Echo: 1. Normal mitral valve. Minimal mitral regurgitation. 2. Mild left ventricular enlargement. 3. Severe global left ventricular systolic dysfunction.  4. The right ventricle is not well visualized; grossly normal right ventricular systolic function. 5. Estimated right ventricular systolic pressure equals 43mm Hg, assuming right atrial pressure equals 10 mm Hg, consistent with mild pulmonary hypertension.  *** Compared with echocardiogram of 5/18/2017, less mitral  regurgitation is noted on the current study.  In addition,  the estimated pulmonary artery systolic pressure has  decreased.    Mono Peralta PGY1 MD

## 2017-05-24 NOTE — CONSULT NOTE ADULT - SUBJECTIVE AND OBJECTIVE BOX
CHIEF COMPLAINT: Shortness of breath. 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      PAST MEDICAL & SURGICAL HISTORY:  Pre-eclampsia complicating hypertension  Pregnancy induced hypertension, antepartum  No pertinent past medical history  No significant past surgical history      [ ] Diabetes   [ ] Hypertension  [ ] Hyperlipidemia  [ ] CAD  [ ] PCI  [ ] CABG    PREVIOUS DIAGNOSTIC TESTING:    [ ] Echocardiogram:  [ ]  Catheterization:  [ ] Stress Test:  	    MEDICATIONS:  digoxin     Tablet 0.125milliGRAM(s) Oral daily  furosemide    Tablet 40milliGRAM(s) Oral daily  carvedilol 9.375milliGRAM(s) Oral every 12 hours  heparin  Infusion 1200Unit(s)/Hr IV Continuous <Continuous>  enalapril 7.5milliGRAM(s) Oral every 12 hours          senna 2Tablet(s) Oral at bedtime  docusate sodium 100milliGRAM(s) Oral three times a day      chlorhexidine 4% Liquid 1Application(s) Topical daily  multivitamin 1Tablet(s) Oral daily  folic acid 1milliGRAM(s) Oral daily      FAMILY HISTORY:  No pertinent family history in first degree relatives      SOCIAL HISTORY:    [ ] Non-smoker  [ ] Smoker  [ ] Alcohol    Allergies    No Known Allergies    Intolerances    	    REVIEW OF SYSTEMS:  CONSTITUTIONAL: No fever, weight loss, or fatigue  EYES: No eye pain, visual disturbances, or discharge  ENMT:  No difficulty hearing, tinnitus, vertigo; No sinus or throat pain  NECK: No pain or stiffness  RESPIRATORY: No cough, wheezing, chills or hemoptysis; No Shortness of Breath  CARDIOVASCULAR: No chest pain, palpitations, passing out, dizziness, or leg swelling  GASTROINTESTINAL: No abdominal or epigastric pain. No nausea, vomiting, or hematemesis; No diarrhea or constipation. No melena or hematochezia.  GENITOURINARY: No dysuria, frequency, hematuria, or incontinence  NEUROLOGICAL: No headaches, memory loss, loss of strength, numbness, or tremors  SKIN: No itching, burning, rashes, or lesions   LYMPH Nodes: No enlarged glands  ENDOCRINE: No heat or cold intolerance; No hair loss  MUSCULOSKELETAL: No joint pain or swelling; No muscle, back, or extremity pain  PSYCHIATRIC: No depression, anxiety, mood swings, or difficulty sleeping  HEME/LYMPH: No easy bruising, or bleeding gums  ALLERY AND IMMUNOLOGIC: No hives or eczema	    [ ] All others negative	  [ ] Unable to obtain    PHYSICAL EXAM:  T(C): 37.1, Max: 37.1 (- @ 20:00)  HR: 86 (71 - 106)  BP: 139/62 (89/44 - 142/72)  RR: 22 (17 - 28)  SpO2: 96% (96% - 99%)  Wt(kg): --  I&O's Summary  I & Os for 24h ending 24 May 2017 07:00  =============================================  IN: 532 ml / OUT: 400 ml / NET: 132 ml    I & Os for current day (as of 24 May 2017 17:44)  =============================================  IN: 415 ml / OUT: 400 ml / NET: 15 ml      Appearance: Normal	  HEENT:   Normal oral mucosa, PERRL, EOMI	  Lymphatic: No lymphadenopathy  Cardiovascular: Normal S1 S2, No JVD, No murmurs, No edema  Respiratory: Lungs clear to auscultation	  Psychiatry: A & O x 3, Mood & affect appropriate  Gastrointestinal:  Soft, Non-tender, + BS	  Skin: No rashes, No ecchymoses, No cyanosis	  Neurologic: Non-focal  Extremities: Normal range of motion, No clubbing, cyanosis or edema  Vascular: Peripheral pulses palpable 2+ bilaterally    TELEMETRY: 	    ECG:  	  RADIOLOGY:  OTHER: 	  	  LABS:	 	    CARDIAC MARKERS:                                  11.8   7.64  )-----------( 350      ( 24 May 2017 06:30 )             38.8     -    139  |  99  |  28<H>  ----------------------------<  99  4.1   |  23  |  1.04    Ca    9.8      24 May 2017 06:30  Phos  4.4     05-24  Mg     2.4     05-24    TPro  9.0<H>  /  Alb  4.3  /  TBili  0.6  /  DBili  x   /  AST  34<H>  /  ALT  40<H>  /  AlkPhos  99  05-24    proBNP:   Lipid Profile:   HgA1c:   TSH:     ASSESSMENT/PLAN: CHIEF COMPLAINT: Shortness of breath. New onset severe global LV dysfunction   HISTORY OF PRESENT ILLNESS:27yo F  history of gestational HTN in recent pregnancy on labetalol outpatient, now one month post-partum from non-elective  for pre-eclampsia at 37w5d, presenting with chief complaint of shortness of breath in the setting of pulmonary edema on chest X ray and elevated pro-BNP concerning for post-partum cardiomyopathy.      PAST MEDICAL & SURGICAL HISTORY:  Pre-eclampsia complicating hypertension  Pregnancy induced hypertension, antepartum  No pertinent past medical history  No significant past surgical history      [ ] Diabetes   [ ] Hypertension  [ ] Hyperlipidemia  [ ] CAD  [ ] PCI  [ ] CABG    PREVIOUS DIAGNOSTIC TESTING:    [ ] Echocardiogram:  [ ]  Catheterization:  [ ] Stress Test:  	    MEDICATIONS:  digoxin     Tablet 0.125milliGRAM(s) Oral daily  furosemide    Tablet 40milliGRAM(s) Oral daily  carvedilol 9.375milliGRAM(s) Oral every 12 hours  heparin  Infusion 1200Unit(s)/Hr IV Continuous <Continuous>  enalapril 7.5milliGRAM(s) Oral every 12 hours          senna 2Tablet(s) Oral at bedtime  docusate sodium 100milliGRAM(s) Oral three times a day      chlorhexidine 4% Liquid 1Application(s) Topical daily  multivitamin 1Tablet(s) Oral daily  folic acid 1milliGRAM(s) Oral daily      FAMILY HISTORY:  No pertinent family history in first degree relatives   PMH / PSH : Pre-eclampsia complicating hypertension  Pregnancy induced hypertension, antepartum  No pertinent past medical history    SOCIAL HISTORY:  Denies smoking, alcohol or drug abuse     Allergies:   No Known Allergies    Intolerances:     	    REVIEW OF SYSTEMS:  CONSTITUTIONAL: No fever, weight loss, or fatigue  EYES: No eye pain, visual disturbances, or discharge  ENMT:  No difficulty hearing, tinnitus, vertigo; No sinus or throat pain  NECK: No pain or stiffness  RESPIRATORY: No cough, wheezing, chills or hemoptysis; No Shortness of Breath  CARDIOVASCULAR: No chest pain, palpitations, passing out, dizziness, or leg swelling  GASTROINTESTINAL: No abdominal or epigastric pain. No nausea, vomiting, or hematemesis; No diarrhea or constipation. No melena or hematochezia.  GENITOURINARY: No dysuria, frequency, hematuria, or incontinence  NEUROLOGICAL: No headaches, memory loss, loss of strength, numbness, or tremors  SKIN: No itching, burning, rashes, or lesions   LYMPH Nodes: No enlarged glands  ENDOCRINE: No heat or cold intolerance; No hair loss  MUSCULOSKELETAL: No joint pain or swelling; No muscle, back, or extremity pain  PSYCHIATRIC: No depression, anxiety, mood swings, or difficulty sleeping  HEME/LYMPH: No easy bruising, or bleeding gums  ALLERY AND IMMUNOLOGIC: No hives or eczema	    [x ] All others negative	  [ ] Unable to obtain    PHYSICAL EXAM:  T(C): 37.1, Max: 37.1 (05-23 @ 20:00)  HR: 86 (71 - 106)  BP: 139/62 (89/44 - 142/72)  RR: 22 (17 - 28)  SpO2: 96% (96% - 99%)  Wt(kg): --  I&O's Summary  I & Os for 24h ending 24 May 2017 07:00  =============================================  IN: 532 ml / OUT: 400 ml / NET: 132 ml    I & Os for current day (as of 24 May 2017 17:44)  =============================================  IN: 415 ml / OUT: 400 ml / NET: 15 ml      Appearance: Normal	  HEENT:   Normal oral mucosa, PERRL, EOMI	  Lymphatic: No lymphadenopathy  Cardiovascular: Normal S1 S2, No JVD, No murmurs, No edema  Respiratory: Lungs clear to auscultation	  Psychiatry: A & O x 3, Mood & affect appropriate  Gastrointestinal:  Soft, Non-tender, + BS	  Skin: No rashes, No ecchymoses, No cyanosis	  Neurologic: Non-focal  Extremities: Normal range of motion, No clubbing, cyanosis or edema  Vascular: Peripheral pulses palpable 2+ bilaterally    TELEMETRY: 	    ECG:  	  TTE: Echocardiogram: CONCLUSIONS:  EF 24%  1. Normal mitral valve. Mild-moderate mitral regurgitation.  2. Moderately dilated left atrium.  LA volume index = 43  cc/m2.  3. Mild left ventricular enlargement.  4. Severe global left ventricular systolic dysfunction.  5. Normal right ventricular size with decreased right  ventricular systolic function.  6. Estimated right ventricular systolic pressure equals 61  mm Hg, assuming right atrial pressure equals 10 mm Hg,  consistent with severe pulmonary hypertension.  *** No previous Echo exam.  ------------------------------------------------------------------------  Confirmed on  2017 - 18:34:13 by Kendell Howe M.D.  RADIOLOGY:  OTHER: 	  	  LABS:	 	    CARDIAC MARKERS:                       11.8   7.64  )-----------( 350      ( 24 May 2017 06:30 )             38.8     -    139  |  99  |  28<H>  ----------------------------<  99  4.1   |  23  |  1.04    Ca    9.8      24 May 2017 06:30  Phos  4.4     -  Mg     2.4     -    TPro  9.0<H>  /  Alb  4.3  /  TBili  0.6  /  DBili  x   /  AST  34<H>  /  ALT  40<H>  /  AlkPhos  99  -24      ASSESSMENT/PLAN: In summary, Ms. Denis26F with gestational HTN, treated with labetalol now s/p urgent C section on 17 for preeclampsia. Patient continued post C section with SOB and tachycardia which briefly improved and then worsened few days preadmission. Patient now with acute decompensated systolic heart failure and likely postpartum cardiomyopathy.   Patient currently monitored closely by CCU and HF team.   Plan for optimal medical management for three months on beta blocker and ACEI therapy. If at that time the LVEF is less than or equal to 35% then ICD may be offered. In the interim, for primary prevention of SCD, lifevest was offered to the patient. The risks and benefits were discussed with patient and family. CHIEF COMPLAINT: Shortness of breath. New onset severe global LV dysfunction   HISTORY OF PRESENT ILLNESS:27yo F  history of gestational HTN in recent pregnancy on labetalol outpatient, now one month post-partum from non-elective  for pre-eclampsia at 37w5d, presenting with chief complaint of shortness of breath in the setting of pulmonary edema on chest X ray and elevated pro-BNP concerning for post-partum cardiomyopathy.    PAST MEDICAL & SURGICAL HISTORY:  Pre-eclampsia complicating hypertension  Pregnancy induced hypertension, antepartum  No pertinent past medical history  No significant past surgical history      PREVIOUS DIAGNOSTIC TESTING:    [X ] Echocardiogram:    MEDICATIONS:  digoxin     Tablet 0.125milliGRAM(s) Oral daily  furosemide    Tablet 40milliGRAM(s) Oral daily  carvedilol 9.375milliGRAM(s) Oral every 12 hours  heparin  Infusion 1200Unit(s)/Hr IV Continuous <Continuous>  enalapril 7.5milliGRAM(s) Oral every 12 hours  senna 2Tablet(s) Oral at bedtime  docusate sodium 100milliGRAM(s) Oral three times a day  chlorhexidine 4% Liquid 1Application(s) Topical daily  multivitamin 1Tablet(s) Oral daily  folic acid 1milliGRAM(s) Oral daily      FAMILY HISTORY:  No pertinent family history in first degree relatives   PMH / PSH : Pre-eclampsia complicating hypertension  Pregnancy induced hypertension, antepartum  No pertinent past medical history    SOCIAL HISTORY:  Denies smoking, alcohol or drug abuse     Allergies:   No Known Allergies    Intolerances:    REVIEW OF SYSTEMS:  CONSTITUTIONAL: No fever, weight loss, or fatigue  EYES: No eye pain, visual disturbances, or discharge  ENMT:  No difficulty hearing, tinnitus, vertigo; No sinus or throat pain  NECK: No pain or stiffness  RESPIRATORY: No cough, wheezing, chills or hemoptysis; No Shortness of Breath  CARDIOVASCULAR:+ chest pain, palpitations, passing out, dizziness,+ leg swelling  GASTROINTESTINAL: No abdominal or epigastric pain. No nausea, vomiting, or hematemesis; No diarrhea or constipation. No melena or hematochezia.  GENITOURINARY: No dysuria, frequency, hematuria, or incontinence  NEUROLOGICAL: No headaches, memory loss, loss of strength, numbness, or tremors  SKIN: No itching, burning, rashes, or lesions   LYMPH Nodes: No enlarged glands  ENDOCRINE: No heat or cold intolerance; No hair loss  MUSCULOSKELETAL: No joint pain or swelling; No muscle, back, or extremity pain  PSYCHIATRIC: No depression, anxiety, mood swings, or difficulty sleeping  HEME/LYMPH: No easy bruising, or bleeding gums  ALLERY AND IMMUNOLOGIC: No hives or eczema	    [x ] All others negative	  [ ] Unable to obtain    PHYSICAL EXAM:  T(C): 37.1, Max: 37.1 (05-23 @ 20:00)  HR: 86 (71 - 106)  BP: 139/62 (89/44 - 142/72)  RR: 22 (17 - 28)  SpO2: 96% (96% - 99%)  Wt(kg): --  I&O's Summary  I & Os for 24h ending 24 May 2017 07:00  =============================================  IN: 532 ml / OUT: 400 ml / NET: 132 ml    I & Os for current day (as of 24 May 2017 17:44)  =============================================  IN: 415 ml / OUT: 400 ml / NET: 15 ml      Appearance: Normal	  HEENT:   Normal oral mucosa, PERRL, EOMI	  Lymphatic: No lymphadenopathy  Cardiovascular: Normal S1 S2, No JVD, No murmurs, No edema  Respiratory: Lungs clear to auscultation	  Psychiatry: A & O x 3, Mood & affect appropriate  Gastrointestinal:  Soft, Non-tender, + BS	  Skin: No rashes, No ecchymoses, No cyanosis	  Neurologic: Non-focal  Extremities: Normal range of motion, No clubbing, cyanosis or edema  Vascular: Peripheral pulses palpable 2+ bilaterally    TELEMETRY: 	    ECG:  	sinus rhythm 70's  TTE: Echocardiogram: CONCLUSIONS:  EF 24%  1. Normal mitral valve. Mild-moderate mitral regurgitation.  2. Moderately dilated left atrium.  LA volume index = 43  cc/m2.  3. Mild left ventricular enlargement.  4. Severe global left ventricular systolic dysfunction.  5. Normal right ventricular size with decreased right  ventricular systolic function.  6. Estimated right ventricular systolic pressure equals 61  mm Hg, assuming right atrial pressure equals 10 mm Hg,  consistent with severe pulmonary hypertension.  *** No previous Echo exam.  ------------------------------------------------------------------------  Confirmed on  2017 - 18:34:13 by Kendell Howe M.D.  Labs:                        11.8   7.64  )-----------( 350      ( 24 May 2017 06:30 )             38.8     05-24    139  |  99  |  28<H>  ----------------------------<  99  4.1   |  23  |  1.04    Ca    9.8      24 May 2017 06:30  Phos  4.4     05-24  Mg     2.4     05-24    TPro  9.0<H>  /  Alb  4.3  /  TBili  0.6  /  DBili  x   /  AST  34<H>  /  ALT  40<H>  /  AlkPhos  99  05-24      ASSESSMENT/PLAN: In summary, Ms. Denis26F with gestational HTN, treated with labetalol now s/p urgent C section on 17 for preeclampsia. Patient continued post C section with SOB and tachycardia which briefly improved and then worsened few days preadmission. Patient now with acute decompensated systolic heart failure and likely postpartum cardiomyopathy.   Patient currently monitored closely by CCU and HF team.   Plan for optimal medical management for three months on beta blocker and ACEI therapy and dig.   If at that time the LVEF is less than or equal to 35% then ICD may be offered. In the interim, for primary prevention of SCD, lifevest offered  The risks and benefits were discussed with patient.     Life vest rep notified.

## 2017-05-24 NOTE — PROGRESS NOTE ADULT - ATTENDING COMMENTS
Patient seen and examined at bedside. Doing well and diuresed well overnight. Appreciate HF recommendations and for now will hold off on bromocriptine. Will continue current management. Dose coumadin tonight and continue with heparin ggt.

## 2017-05-24 NOTE — PROGRESS NOTE ADULT - ASSESSMENT
Patient is a 26y old  female w/  gestational HTN during recent pregnancy, s/p non-elective  for pre-eclampsia presents w/ complaint of SOB. 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 Today she feels much better.  Repeat TTE shows improvement in PA pressures, and MR/TR improved as well. Renal function continues to improve as well.     Continue Coreg 9.375 mg po BID,  captopril to 6.25 mg TID, and up-titrate if SBP allows. Continue Digoxin 0.125 mg qd and lasix 40 mg po qd.   Pt will need life vest on D/c.  She wants to talk to Dr. Mace more about the use of Bromocriptine.

## 2017-05-24 NOTE — PROGRESS NOTE ADULT - PROBLEM SELECTOR PLAN 3
--initially resolved, now some recurrence of chest tightness/pressure. Does not appear ischemic  --may be related to pulmonary edema, pulmonary exam limited 2/2 body habitus, consider CXR --initially resolved, now some recurrence of chest tightness/pressure. Does not appear ischemic  --may be related to pulmonary edema, pulmonary exam limited 2/2 body habitus, consider CXR=>relatively unchanged

## 2017-05-25 DIAGNOSIS — I50.21 ACUTE SYSTOLIC (CONGESTIVE) HEART FAILURE: ICD-10-CM

## 2017-05-25 LAB
ALBUMIN SERPL ELPH-MCNC: 4.2 G/DL — SIGNIFICANT CHANGE UP (ref 3.3–5)
ALP SERPL-CCNC: 95 U/L — SIGNIFICANT CHANGE UP (ref 40–120)
ALT FLD-CCNC: 37 U/L — HIGH (ref 4–33)
APTT BLD: 73.5 SEC — HIGH (ref 27.5–37.4)
AST SERPL-CCNC: 27 U/L — SIGNIFICANT CHANGE UP (ref 4–32)
BILIRUB SERPL-MCNC: 0.6 MG/DL — SIGNIFICANT CHANGE UP (ref 0.2–1.2)
BUN SERPL-MCNC: 23 MG/DL — SIGNIFICANT CHANGE UP (ref 7–23)
CALCIUM SERPL-MCNC: 10.2 MG/DL — SIGNIFICANT CHANGE UP (ref 8.4–10.5)
CHLORIDE SERPL-SCNC: 100 MMOL/L — SIGNIFICANT CHANGE UP (ref 98–107)
CO2 SERPL-SCNC: 24 MMOL/L — SIGNIFICANT CHANGE UP (ref 22–31)
CREAT SERPL-MCNC: 0.86 MG/DL — SIGNIFICANT CHANGE UP (ref 0.5–1.3)
GLUCOSE SERPL-MCNC: 91 MG/DL — SIGNIFICANT CHANGE UP (ref 70–99)
HCT VFR BLD CALC: 37.8 % — SIGNIFICANT CHANGE UP (ref 34.5–45)
HGB BLD-MCNC: 11.5 G/DL — SIGNIFICANT CHANGE UP (ref 11.5–15.5)
INR BLD: 1.1 — SIGNIFICANT CHANGE UP (ref 0.88–1.17)
MAGNESIUM SERPL-MCNC: 2.4 MG/DL — SIGNIFICANT CHANGE UP (ref 1.6–2.6)
MCHC RBC-ENTMCNC: 24 PG — LOW (ref 27–34)
MCHC RBC-ENTMCNC: 30.4 % — LOW (ref 32–36)
MCV RBC AUTO: 78.8 FL — LOW (ref 80–100)
PHOSPHATE SERPL-MCNC: 4.1 MG/DL — SIGNIFICANT CHANGE UP (ref 2.5–4.5)
PLATELET # BLD AUTO: 305 K/UL — SIGNIFICANT CHANGE UP (ref 150–400)
PMV BLD: 9.9 FL — SIGNIFICANT CHANGE UP (ref 7–13)
POTASSIUM SERPL-MCNC: 3.9 MMOL/L — SIGNIFICANT CHANGE UP (ref 3.5–5.3)
POTASSIUM SERPL-SCNC: 3.9 MMOL/L — SIGNIFICANT CHANGE UP (ref 3.5–5.3)
PROT SERPL-MCNC: 8.7 G/DL — HIGH (ref 6–8.3)
PROTHROM AB SERPL-ACNC: 12.3 SEC — SIGNIFICANT CHANGE UP (ref 9.8–13.1)
RBC # BLD: 4.8 M/UL — SIGNIFICANT CHANGE UP (ref 3.8–5.2)
RBC # FLD: 16.5 % — HIGH (ref 10.3–14.5)
SODIUM SERPL-SCNC: 141 MMOL/L — SIGNIFICANT CHANGE UP (ref 135–145)
WBC # BLD: 7.49 K/UL — SIGNIFICANT CHANGE UP (ref 3.8–10.5)
WBC # FLD AUTO: 7.49 K/UL — SIGNIFICANT CHANGE UP (ref 3.8–10.5)

## 2017-05-25 PROCEDURE — 99232 SBSQ HOSP IP/OBS MODERATE 35: CPT

## 2017-05-25 PROCEDURE — 99232 SBSQ HOSP IP/OBS MODERATE 35: CPT | Mod: GC

## 2017-05-25 PROCEDURE — 93010 ELECTROCARDIOGRAM REPORT: CPT

## 2017-05-25 RX ORDER — WARFARIN SODIUM 2.5 MG/1
5 TABLET ORAL ONCE
Qty: 0 | Refills: 0 | Status: COMPLETED | OUTPATIENT
Start: 2017-05-25 | End: 2017-05-25

## 2017-05-25 RX ADMIN — Medication 0.12 MILLIGRAM(S): at 06:18

## 2017-05-25 RX ADMIN — Medication 7.5 MILLIGRAM(S): at 09:49

## 2017-05-25 RX ADMIN — WARFARIN SODIUM 5 MILLIGRAM(S): 2.5 TABLET ORAL at 18:49

## 2017-05-25 RX ADMIN — CARVEDILOL PHOSPHATE 9.38 MILLIGRAM(S): 80 CAPSULE, EXTENDED RELEASE ORAL at 09:05

## 2017-05-25 RX ADMIN — Medication 1 TABLET(S): at 12:55

## 2017-05-25 RX ADMIN — SENNA PLUS 2 TABLET(S): 8.6 TABLET ORAL at 22:35

## 2017-05-25 RX ADMIN — Medication 1 MILLIGRAM(S): at 12:55

## 2017-05-25 RX ADMIN — Medication 100 MILLIGRAM(S): at 22:35

## 2017-05-25 RX ADMIN — Medication 100 MILLIGRAM(S): at 06:18

## 2017-05-25 RX ADMIN — Medication 40 MILLIGRAM(S): at 06:18

## 2017-05-25 RX ADMIN — Medication 100 MILLIGRAM(S): at 14:43

## 2017-05-25 RX ADMIN — CARVEDILOL PHOSPHATE 9.38 MILLIGRAM(S): 80 CAPSULE, EXTENDED RELEASE ORAL at 20:12

## 2017-05-25 RX ADMIN — HEPARIN SODIUM 12 UNIT(S)/HR: 5000 INJECTION INTRAVENOUS; SUBCUTANEOUS at 20:13

## 2017-05-25 RX ADMIN — CHLORHEXIDINE GLUCONATE 1 APPLICATION(S): 213 SOLUTION TOPICAL at 12:56

## 2017-05-25 RX ADMIN — Medication 7.5 MILLIGRAM(S): at 22:35

## 2017-05-25 NOTE — PROGRESS NOTE ADULT - PROBLEM SELECTOR PLAN 1
--patient p/w symptoms concerning for CHF with dyspnea on exertion, orthopnea and palpitations in the setting of recently delivery for pre-eclampsia, a statistically significant risk factor for PPCM  --evidence on TTE of severe global systolic LV dysfunction with EF 24%, also mild RV systolic dysfunction concerning for right-sided HF 2/2 progressive left-sided HF. Severe pulmonary HTN likely class II 2/2 CHF. Repeat TTE showed stable EF but improved pulmonary HTN.   --unlikely ischemic CM given presenting EKG and negative CE on admission  --symptoms and pulmonary exam improved with Lasix diuresis and normalization of BPs, though remains symptomatic   - Pt declined off-label use of bromocriptine. Will continue with full anticoagulation hep gtt (at goal), start coumadin  --currently treating CHF captopril 6.25mg q8hrs, Lasix 40mg PO qday, coreg, digoxin.  Patient is not breastfeeding, does not need to be considered in pharmacotherapy  --prognosis unclear, patient will need close cardiologist follow up after discharge to continually assess cardiac function.   - However, pt is clinically euvolemic or slightly hypovolemic.   --ob/gyn on board, appreciate recs.  - f/u heart failure, consider R heart cath to assess fluid status --patient p/w symptoms concerning for CHF with dyspnea on exertion, orthopnea and palpitations in the setting of recently delivery for pre-eclampsia, a statistically significant risk factor for PPCM  --evidence on TTE of severe global systolic LV dysfunction with EF 24%, also mild RV systolic dysfunction concerning for right-sided HF 2/2 progressive left-sided HF. Severe pulmonary HTN likely class II 2/2 CHF. Repeat TTE showed stable EF but improved pulmonary HTN.   --unlikely ischemic CM given presenting EKG and negative CE on admission  --symptoms and pulmonary exam improved with Lasix diuresis and normalization of BPs, though remains symptomatic   - Pt declined off-label use of bromocriptine. Will continue with full anticoagulation hep gtt (at goal), start coumadin tonight (will bridge)  --currently treating CHF, enalapril 7.5mg BID, Lasix 40mg PO qday, coreg, digoxin.  Patient is not breastfeeding, does not need to be considered in pharmacotherapy  --prognosis unclear, patient will need close cardiologist follow up after discharge to continually assess cardiac function.   - However, pt is clinically euvolemic or slightly hypovolemic.   --ob/gyn on board, appreciate recs.  - f/u heart failure team, consider R heart cath to assess fluid status --patient p/w symptoms concerning for CHF with dyspnea on exertion, orthopnea and palpitations in the setting of recently delivery for pre-eclampsia, a statistically significant risk factor for PPCM  --evidence on TTE of severe global systolic LV dysfunction with EF 24%, also mild RV systolic dysfunction concerning for right-sided HF 2/2 progressive left-sided HF. Severe pulmonary HTN likely class II 2/2 CHF. Repeat TTE showed stable EF but improved pulmonary HTN.   --unlikely ischemic CM given presenting EKG and negative CE on admission  --symptoms and pulmonary exam improved with Lasix diuresis and normalization of BPs, though remains symptomatic   - Pt declined off-label use of bromocriptine. Will continue with full anticoagulation hep gtt (at goal), start coumadin tonight (will bridge)  --currently treating CHF, enalapril 7.5mg BID, Lasix 40mg PO qday, coreg, digoxin.  Patient is not breastfeeding, does not need to be considered in pharmacotherapy  --prognosis unclear, patient will need close cardiologist follow up after discharge to continually assess cardiac function.   - However, pt is clinically euvolemic or slightly hypovolemic.   --ob/gyn on board, appreciate recs.  - f/u heart failure team, consider R heart cath to assess fluid status  - f/u EP regarding life vest. rep notified.

## 2017-05-25 NOTE — PROGRESS NOTE ADULT - ASSESSMENT
Patient is a 26y old  female w/  gestational HTN during recent pregnancy, s/p non-elective  for pre-eclampsia presents w/ complaint of SOB. 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

## 2017-05-25 NOTE — PROGRESS NOTE ADULT - PROBLEM SELECTOR PROBLEM 3
Chest pain on exertion
S/P 
Chest pain on exertion
Chest pain on exertion

## 2017-05-25 NOTE — PROGRESS NOTE ADULT - PROBLEM SELECTOR PLAN 3
--initially resolved, now some recurrence of chest tightness/pressure. Does not appear ischemic  --may be related to pulmonary edema, pulmonary exam limited 2/2 body habitus, consider CXR=>relatively unchanged

## 2017-05-25 NOTE — PROGRESS NOTE ADULT - ATTENDING COMMENTS
Excellent progress. HR 80-90 SBP   Enalapril 7.5 bid Coreg 9.375, Lasix 40 Heparin   I/O -203  PTT 73 Cr 0.8  Exam no JVP lungs clear no LE edema.  Making good progress. Would start coumadin, to step down. possible increase in enalapril tomorrow.   James Mace Ms. Denis is a 26 year old woman with gestational hypertension, treated with labetalol. She underwent  one moth ago for pre-eclampsia and developed acute pulmonary edema due to peripartum cardiomyopathy with severe LV systolic dysfunction (EF = 24%). Current management includes IV heparin 1200 Units/hr, enalapril 7.5 mg every 12 hours, digoxin 0.125 mg daily, furosemide 40 mg by mouth daily, carvedilol 9.375 mg every 12 hours, folic and acid 1 mg daily . She is undergoing assessment for life vest. Planning transition to warfarin.

## 2017-05-25 NOTE — PROGRESS NOTE ADULT - SUBJECTIVE AND OBJECTIVE BOX
Date of Admission: 2017    24H hour events: Patient feels better with no chest discomfort. Walked yesterday, still felt palpitations but denies SOB. HR in high 50s/60-80s o/n while resting. HR to 100s yesterday while walking. No abd pain, no weakness/numbness, no headache, no bleeding, no BM for 2 days. Not eating well due to dislike of food.    Vital Signs Last 24 Hrs  T(C): 36.6, Max: 37.1 (-24 @ 16:10)  T(F): 97.8, Max: 98.7 (05-24 @ 16:10)  HR: 76 (70 - 106)  BP: 105/38 (89/44 - 139/62)  BP(mean): 56 (48 - 366)  RR: 18 (17 - 23)  SpO2: 98% (96% - 100%)  I&O's Summary    I & Os for current day (as of 25 May 2017 08:21)  =============================================  IN: 797 ml / OUT: 1000 ml / NET: -203 ml      MEDICATIONS:  digoxin     Tablet 0.125milliGRAM(s) Oral daily  furosemide    Tablet 40milliGRAM(s) Oral daily  carvedilol 9.375milliGRAM(s) Oral every 12 hours  heparin  Infusion 1200Unit(s)/Hr IV Continuous <Continuous>  enalapril 7.5milliGRAM(s) Oral every 12 hours  senna 2Tablet(s) Oral at bedtime  docusate sodium 100milliGRAM(s) Oral three times a day  chlorhexidine 4% Liquid 1Application(s) Topical daily  multivitamin 1Tablet(s) Oral daily  folic acid 1milliGRAM(s) Oral daily      PHYSICAL EXAM:  Appearance: Normal, NAD	  HEENT:   Normal oral mucosa, PERRL  Cardiovascular: Normal S1 S2, No JVD  Respiratory: Lungs clear to auscultation, but decreased airflow in lower bases b/l	  Psychiatry: AAO, Mood & affect appropriate  Gastrointestinal:  Soft, Non-tender except a small area of very mild tenderness of LLQ, + BS, lower  c/d/i  Neurologic: grossly 5/5 in all extremities  Extremities: Normal range of motion, No clubbing, cyanosis or edema  Vascular: Peripheral pulses palpable 2+ bilaterally        LABS:	 	    CBC Full  -  ( 25 May 2017 06:00 )  WBC Count : 7.49 K/uL  Hemoglobin : 11.5 g/dL  Hematocrit : 37.8 %  Platelet Count - Automated : 305 K/uL  Mean Cell Volume : 78.8 fL  Mean Cell Hemoglobin : 24.0 pg  Mean Cell Hemoglobin Concentration : 30.4 %  Auto Neutrophil # : x  Auto Lymphocyte # : x  Auto Monocyte # : x  Auto Eosinophil # : x  Auto Basophil # : x  Auto Neutrophil % : x  Auto Lymphocyte % : x  Auto Monocyte % : x  Auto Eosinophil % : x  Auto Basophil % : x    05-25    141  |  100  |  23  ----------------------------<  91  3.9   |  24  |  0.86  05-24    139  |  99  |  28<H>  ----------------------------<  99  4.1   |  23  |  1.04    Ca    10.2      25 May 2017 06:00  Ca    9.8      24 May 2017 06:30  Phos  4.1     05-25  Phos  4.4     05-24  Mg     2.4     05-25  Mg     2.4     05-24    TPro  8.7<H>  /  Alb  4.2  /  TBili  0.6  /  DBili  x   /  AST  27  /  ALT  37<H>  /  AlkPhos  95  05-25  TPro  9.0<H>  /  Alb  4.3  /  TBili  0.6  /  DBili  x   /  AST  34<H>  /  ALT  40<H>  /  AlkPhos  99  05-24      proBNP: Serum Pro-Brain Natriuretic Peptide: 1304 pg/mL ( @ 06:30)    Lipid Profile:   HgA1c:   TSH:       CARDIAC MARKERS:            TELEMETRY: 	    ECG:  	  RADIOLOGY:  ECHO:  OTHER: 	    PREVIOUS DIAGNOSTIC TESTING:    [ ] Echocardiogram:  [ ]  Catheterization:  [ ] Stress Test:  	  	  ASSESSMENT/PLAN: 	    [place] [id number] Date of Admission: 2017    24H hour events: Patient feels better with no chest discomfort. Walked yesterday, still felt palpitations but denies SOB. HR in high 50s/60-80s o/n while resting. HR to 110s yesterday while walking. No abd pain, no weakness/numbness, no headache, no bleeding, no BM for 2 days. Not eating well due to dislike of food.    Vital Signs Last 24 Hrs  T(C): 36.6, Max: 37.1 (- @ 16:10)  T(F): 97.8, Max: 98.7 (05-24 @ 16:10)  HR: 76 (70 - 106)  BP: 105/38 (89/44 - 139/62)  BP(mean): 56 (48 - 366)  RR: 18 (17 - 23)  SpO2: 98% (96% - 100%)  I&O's Summary    I & Os for current day (as of 25 May 2017 08:21)  =============================================  IN: 797 ml / OUT: 1000 ml / NET: -203 ml      MEDICATIONS:  digoxin     Tablet 0.125milliGRAM(s) Oral daily  furosemide    Tablet 40milliGRAM(s) Oral daily  carvedilol 9.375milliGRAM(s) Oral every 12 hours  heparin  Infusion 1200Unit(s)/Hr IV Continuous <Continuous>  enalapril 7.5milliGRAM(s) Oral every 12 hours  senna 2Tablet(s) Oral at bedtime  docusate sodium 100milliGRAM(s) Oral three times a day  chlorhexidine 4% Liquid 1Application(s) Topical daily  multivitamin 1Tablet(s) Oral daily  folic acid 1milliGRAM(s) Oral daily      PHYSICAL EXAM:  Appearance: Normal, NAD	  HEENT:   Normal oral mucosa, PERRL  Cardiovascular: Normal S1 S2, No JVD  Respiratory: Lungs clear to auscultation, but decreased airflow in lower bases b/l	  Psychiatry: AAO, Mood & affect appropriate  Gastrointestinal:  Soft, Non-tender except a small area of very mild tenderness of LLQ, + BS, lower  c/d/i  Neurologic: grossly 5/5 in all extremities  Extremities: Normal range of motion, No edema in lower extremities    LABS:	 	    CBC Full  -  ( 25 May 2017 06:00 )  WBC Count : 7.49 K/uL  Hemoglobin : 11.5 g/dL  Hematocrit : 37.8 %  Platelet Count - Automated : 305 K/uL  Mean Cell Volume : 78.8 fL  Mean Cell Hemoglobin : 24.0 pg  Mean Cell Hemoglobin Concentration : 30.4 %  Auto Neutrophil # : x  Auto Lymphocyte # : x  Auto Monocyte # : x  Auto Eosinophil # : x  Auto Basophil # : x  Auto Neutrophil % : x  Auto Lymphocyte % : x  Auto Monocyte % : x  Auto Eosinophil % : x  Auto Basophil % : x    05-25    141  |  100  |  23  ----------------------------<  91  3.9   |  24  |  0.86  05-24    139  |  99  |  28<H>  ----------------------------<  99  4.1   |  23  |  1.04    Ca    10.2      25 May 2017 06:00  Ca    9.8      24 May 2017 06:30  Phos  4.1     05-25  Phos  4.4     05-24  Mg     2.4     05-25  Mg     2.4     05-24    TPro  8.7<H>  /  Alb  4.2  /  TBili  0.6  /  DBili  x   /  AST  27  /  ALT  37<H>  /  AlkPhos  95  05-25  TPro  9.0<H>  /  Alb  4.3  /  TBili  0.6  /  DBili  x   /  AST  34<H>  /  ALT  40<H>  /  AlkPhos  99  05-24      proBNP: Serum Pro-Brain Natriuretic Peptide: 1304 pg/mL ( @ 06:30)  TELEMETRY: HR to high 50s, to 110s yesterday while walking  	      Mono Peralta MD PGY1

## 2017-05-25 NOTE — PROGRESS NOTE ADULT - PROBLEM SELECTOR PLAN 4
- likely cardiorenal vs. lasix/ACEI induced  - improved from yesterday, will trend - likely cardiorenal vs. lasix/ACEI induced  - resolved, will trend

## 2017-05-25 NOTE — PROGRESS NOTE ADULT - PROBLEM SELECTOR PLAN 1
Pt is hemodynamically stable  Continue digoxin 0.125mg, furosemide 40mg QD  carvedilol 9.375mg BID  Increase enalapril 10 mg bid    heparin  Infusion 1200Unit(s)/Hr IV Continuous <Continuous>  for prevention of LV thrombus   start Dosing coumadin today  INR goal 2-3  will d/c home with WCD   will start teaching re: mother baby bonding and WCD

## 2017-05-25 NOTE — PROGRESS NOTE ADULT - SUBJECTIVE AND OBJECTIVE BOX
Chief Complaint: Patient feeling much better. Denies chest pain, shortness of breath, lightheadedness or dizziness. Has occasional palpitations with ambulation.  Resting heart rate 50's to 80's with  increase to 110bpm while walking. Met with the Life Vest representative earlier today. All questions were answered. Plan is to fit her tomorrow.      Vital Signs Last 24 Hrs                                                              T(C): 36.9, Max: 37.1 ( @ 16:10)  T(F): 98.5, Max: 98.7 ( @ 16:10)  HR: 88 (70 - 91)  BP: 95/54 (95/54 - 139/62)  BP(mean): 65 (48 - 366)  RR: 20 (17 - 24)  SpO2: 98% (96% - 100%)      EKG: Normal sinus rhythm 75bpm.  Telemetry: Sinus rhythm to sinus tachycardia. No ectopy.  MEDICATIONS  (STANDING):  chlorhexidine 4% Liquid 1Application(s) Topical daily  multivitamin 1Tablet(s) Oral daily  folic acid 1milliGRAM(s) Oral daily  senna 2Tablet(s) Oral at bedtime  docusate sodium 100milliGRAM(s) Oral three times a day  digoxin     Tablet 0.125milliGRAM(s) Oral daily  furosemide    Tablet 40milliGRAM(s) Oral daily  carvedilol 9.375milliGRAM(s) Oral every 12 hours  heparin  Infusion 1200Unit(s)/Hr IV Continuous <Continuous>  enalapril 7.5milliGRAM(s) Oral every 12 hours    MEDICATIONS  (PRN):    Physical exam:   Gen- NAD. Mode and affect appropriate  Resp- decreased breath sounds bilateral bases but no adventitious sounds appreciated.  CV- S1 and S2 normal. No JVD.  ABD- not examined.. Patient sitting in chair  EXT- no edema or calf tenderness.   Neuro no deficets.    ECHOCARDIOGRAM: minimal mitral regurgitation. Severe global LV systolic dysfunction. EF 24%                        11.5   7.49  )-----------( 305      ( 25 May 2017 06:00 )             37.8     PT/INR - ( 25 May 2017 06:00 )   PT: 12.3 SEC;   INR: 1.10          PTT - ( 25 May 2017 06:00 )  PTT:73.5 SEC      141  |  100  |  23  ----------------------------<  91  3.9   |  24  |  0.86    Ca    10.2      25 May 2017 06:00  Phos  4.1       Mg     2.4         TPro  8.7<H>  /  Alb  4.2  /  TBili  0.6  /  DBili  x   /  AST  27  /  ALT  37<H>  /  AlkPhos  95        Assessment and Plan: 27 yo female with gestational hypertension during recent pregnancy who is one month postpartum from non-elective  for pre-eclampsia admitted with shortness of breath in the setting of pulmonary edema and severely reduced left ventricular systolic function likely secondary to peripartum cardiomyopathy. Started on optimal medical therapy with significant improvement of symptoms. She will be re-evaluated with an echocardiogram in three months and if at that time her LVEF remains less than or equal to 35%, an ICD will be recommended. In the meantime, she is to be discharged with a Life Vest. All questions have been answered and she and her family are in agreement.

## 2017-05-26 DIAGNOSIS — Z29.9 ENCOUNTER FOR PROPHYLACTIC MEASURES, UNSPECIFIED: ICD-10-CM

## 2017-05-26 LAB
ALBUMIN SERPL ELPH-MCNC: 4.1 G/DL — SIGNIFICANT CHANGE UP (ref 3.3–5)
ALP SERPL-CCNC: 94 U/L — SIGNIFICANT CHANGE UP (ref 40–120)
ALT FLD-CCNC: 37 U/L — HIGH (ref 4–33)
APTT BLD: 66.6 SEC — HIGH (ref 27.5–37.4)
AST SERPL-CCNC: 25 U/L — SIGNIFICANT CHANGE UP (ref 4–32)
BILIRUB SERPL-MCNC: 0.6 MG/DL — SIGNIFICANT CHANGE UP (ref 0.2–1.2)
BUN SERPL-MCNC: 19 MG/DL — SIGNIFICANT CHANGE UP (ref 7–23)
CALCIUM SERPL-MCNC: 10 MG/DL — SIGNIFICANT CHANGE UP (ref 8.4–10.5)
CHLORIDE SERPL-SCNC: 97 MMOL/L — LOW (ref 98–107)
CO2 SERPL-SCNC: 23 MMOL/L — SIGNIFICANT CHANGE UP (ref 22–31)
CREAT SERPL-MCNC: 0.83 MG/DL — SIGNIFICANT CHANGE UP (ref 0.5–1.3)
GLUCOSE SERPL-MCNC: 88 MG/DL — SIGNIFICANT CHANGE UP (ref 70–99)
HCT VFR BLD CALC: 37.4 % — SIGNIFICANT CHANGE UP (ref 34.5–45)
HGB BLD-MCNC: 11.5 G/DL — SIGNIFICANT CHANGE UP (ref 11.5–15.5)
INR BLD: 1.1 — SIGNIFICANT CHANGE UP (ref 0.88–1.17)
MAGNESIUM SERPL-MCNC: 2.2 MG/DL — SIGNIFICANT CHANGE UP (ref 1.6–2.6)
MCHC RBC-ENTMCNC: 24.4 PG — LOW (ref 27–34)
MCHC RBC-ENTMCNC: 30.7 % — LOW (ref 32–36)
MCV RBC AUTO: 79.2 FL — LOW (ref 80–100)
PHOSPHATE SERPL-MCNC: 4.1 MG/DL — SIGNIFICANT CHANGE UP (ref 2.5–4.5)
PLATELET # BLD AUTO: 295 K/UL — SIGNIFICANT CHANGE UP (ref 150–400)
PMV BLD: 10.3 FL — SIGNIFICANT CHANGE UP (ref 7–13)
POTASSIUM SERPL-MCNC: 4 MMOL/L — SIGNIFICANT CHANGE UP (ref 3.5–5.3)
POTASSIUM SERPL-SCNC: 4 MMOL/L — SIGNIFICANT CHANGE UP (ref 3.5–5.3)
PROT SERPL-MCNC: 8.8 G/DL — HIGH (ref 6–8.3)
PROTHROM AB SERPL-ACNC: 12.4 SEC — SIGNIFICANT CHANGE UP (ref 9.8–13.1)
RBC # BLD: 4.72 M/UL — SIGNIFICANT CHANGE UP (ref 3.8–5.2)
RBC # FLD: 16.4 % — HIGH (ref 10.3–14.5)
SODIUM SERPL-SCNC: 137 MMOL/L — SIGNIFICANT CHANGE UP (ref 135–145)
WBC # BLD: 7.46 K/UL — SIGNIFICANT CHANGE UP (ref 3.8–10.5)
WBC # FLD AUTO: 7.46 K/UL — SIGNIFICANT CHANGE UP (ref 3.8–10.5)

## 2017-05-26 PROCEDURE — 99232 SBSQ HOSP IP/OBS MODERATE 35: CPT

## 2017-05-26 PROCEDURE — 99233 SBSQ HOSP IP/OBS HIGH 50: CPT

## 2017-05-26 RX ORDER — WARFARIN SODIUM 2.5 MG/1
5 TABLET ORAL ONCE
Qty: 0 | Refills: 0 | Status: COMPLETED | OUTPATIENT
Start: 2017-05-26 | End: 2017-05-26

## 2017-05-26 RX ADMIN — Medication 100 MILLIGRAM(S): at 21:26

## 2017-05-26 RX ADMIN — Medication 40 MILLIGRAM(S): at 05:30

## 2017-05-26 RX ADMIN — Medication 10 MILLIGRAM(S): at 23:44

## 2017-05-26 RX ADMIN — Medication 100 MILLIGRAM(S): at 13:14

## 2017-05-26 RX ADMIN — Medication 1 MILLIGRAM(S): at 13:14

## 2017-05-26 RX ADMIN — Medication 100 MILLIGRAM(S): at 05:30

## 2017-05-26 RX ADMIN — CARVEDILOL PHOSPHATE 9.38 MILLIGRAM(S): 80 CAPSULE, EXTENDED RELEASE ORAL at 05:30

## 2017-05-26 RX ADMIN — Medication 7.5 MILLIGRAM(S): at 10:29

## 2017-05-26 RX ADMIN — CARVEDILOL PHOSPHATE 9.38 MILLIGRAM(S): 80 CAPSULE, EXTENDED RELEASE ORAL at 21:26

## 2017-05-26 RX ADMIN — CHLORHEXIDINE GLUCONATE 1 APPLICATION(S): 213 SOLUTION TOPICAL at 13:14

## 2017-05-26 RX ADMIN — WARFARIN SODIUM 5 MILLIGRAM(S): 2.5 TABLET ORAL at 17:17

## 2017-05-26 RX ADMIN — Medication 1 TABLET(S): at 13:14

## 2017-05-26 RX ADMIN — Medication 0.12 MILLIGRAM(S): at 05:30

## 2017-05-26 RX ADMIN — HEPARIN SODIUM 12 UNIT(S)/HR: 5000 INJECTION INTRAVENOUS; SUBCUTANEOUS at 08:36

## 2017-05-26 NOTE — PROGRESS NOTE ADULT - PROBLEM SELECTOR PLAN 1
Pt is hemodynamically stable  Continue digoxin 0.125mg, furosemide 40mg QD  carvedilol 9.375mg BID  please check orthostatic B/P      heparin  Infusion 1200Unit(s)/Hr IV Continuous <Continuous>  for prevention of LV thrombus   start Dosing coumadin today  INR goal 2-3  will d/c home with WCD   will start teaching re: mother baby bonding and WCD

## 2017-05-26 NOTE — PROGRESS NOTE ADULT - ASSESSMENT
26 year old female with peripartum cardiomyopathy with severely reduced left ventricular systolic dysfunction. She has been started on optimal medical therapy and has had improvement of her symptoms. However, today she had increased shortness of breath with associated cough with ambulation which resolved when she got back into bed. Awaiting heart failure input. Possible increase in diuretic? Last CXR 5/23 showed clear lungs.  As for the Life Vest, she will be fitted later today. She has a follow-up appointment with Dr. Mcgee on 9/1/2017 @ 11:00am for evaluation for possible ICD. She is aware and agrees to the plan.

## 2017-05-26 NOTE — PROGRESS NOTE ADULT - ATTENDING COMMENTS
Making good progress. Ambulating. Cr stable.   HR 70-80 SR, -120  Exam: no evidence for volume overload.   Clinically improved.   Suggest: increase lisinopril 10BID. Could consider d/c sunday on lovenox with lifevest and follow up with HF clinic within a few days.  James Mace

## 2017-05-26 NOTE — PROGRESS NOTE ADULT - SUBJECTIVE AND OBJECTIVE BOX
Patient is a 26y old  Female who presents with a chief complaint of chest pain and shortness of breath (19 May 2017 11:00). Today she is complaining of SOB and cough that occured  while she walked to the bathroom this morning. It lasted for a few minutes then went away. Currently no SOB at rest, orhopnea, PND, CP or palpitations.           MEDICATIONS  (STANDING):  chlorhexidine 4% Liquid 1Application(s) Topical daily  multivitamin 1Tablet(s) Oral daily  folic acid 1milliGRAM(s) Oral daily  senna 2Tablet(s) Oral at bedtime  docusate sodium 100milliGRAM(s) Oral three times a day  digoxin     Tablet 0.125milliGRAM(s) Oral daily  furosemide    Tablet 40milliGRAM(s) Oral daily  carvedilol 9.375milliGRAM(s) Oral every 12 hours  heparin  Infusion 1200Unit(s)/Hr IV Continuous <Continuous>  enalapril 7.5milliGRAM(s) Oral every 12 hours  warfarin 5milliGRAM(s) Oral once    MEDICATIONS  (PRN):      Allergies    No Known Allergies    Intolerances    Height (cm): 167.6 (05-18 @ 17:30)  Weight (kg): 110 (05-18 @ 19:00)  BMI (kg/m2): 39.2 (05-18 @ 19:00)  BSA (m2): 2.17 (05-18 @ 19:00)  Vital Signs Last 24 Hrs  T(C): 36.7, Max: 36.9 (05-25 @ 12:00)  T(F): 98, Max: 98.5 (05-25 @ 12:00)  HR: 85 (63 - 93)  BP: 120/57 (95/39 - 120/57)  BP(mean): 51 (42 - 74)  RR: 16 (16 - 24)  SpO2: 98% (97% - 99%)  [ ] room air   [ ] 02    PHYSICAL EXAM:  GENERAL:  No acute distress, well appearing, [ ] Agitated, [ ] Lethargy, [ ] confused   HEAD:  normal  ENMT: normal  NECK:  no JVD   NERVOUS SYSTEM:  Alert & Oriented X3, no focal deficits [ ]Confusion  [ ] Encephalopathic [ ] Sedated [ ] Other  CHEST/LUNG: Clear to auscultation bilaterally,  [ ] decreased breath sounds at bases  [ ] wheezing   [ ] rhonchi  [ ] crackles  HEART:  Regular rate and rhythm, No murmurs, rubs, or gallops,  [ ] irregular   ABDOMEN:  soft, nontender, nondistended, positive bowel sounds   [ ] obese  EXTREMITIES: No clubbing, cyanosis or edema  SKIN: [ ] venous stasis skin changes    LABS:                        11.5   7.46  )-----------( 295      ( 26 May 2017 07:10 )             37.4     26 May 2017 07:10    137    |  97     |  19     ----------------------------<  88     4.0     |  23     |  0.83     Ca    10.0       26 May 2017 07:10  Phos  4.1       26 May 2017 07:10  Mg     2.2       26 May 2017 07:10    TPro  8.8    /  Alb  4.1    /  TBili  0.6    /  DBili  x      /  AST  25     /  ALT  37     /  AlkPhos  94     26 May 2017 07:10    PT/INR - ( 26 May 2017 07:10 )   PT: 12.4 SEC;   INR: 1.10       PTT - ( 26 May 2017 07:10 )  PTT:66.6 SEC  Hemoglobin A1C, Whole Blood: 5.9 % (05-19 @ 06:10)

## 2017-05-26 NOTE — PROGRESS NOTE ADULT - SUBJECTIVE AND OBJECTIVE BOX
Chief Complaint: Patient comfortable at rest. No chest pain, shortness of breath, palpitations or lightheadedness. States that this morning she became SOB when ambulating a short distance to the bathroom and developed a cough which lingered for a while. Now the cough has resolved. Talked about the Life Vest and all questions were answered.         Vital Signs Last 24 Hrs  T(C): 36.7, Max: 36.8 (05-25 @ 17:08)  T(F): 98.1, Max: 98.3 (05-25 @ 19:56)  HR: 87 (63 - 93)  BP: 118/52 (95/39 - 120/57)  BP(mean): 51 (42 - 74)  RR: 18 (16 - 22)  SpO2: 100% (97% - 100%)    Telemetry: Normal sinus rhythm    MEDICATIONS  (STANDING):  chlorhexidine 4% Liquid 1Application(s) Topical daily  multivitamin 1Tablet(s) Oral daily  folic acid 1milliGRAM(s) Oral daily  senna 2Tablet(s) Oral at bedtime  docusate sodium 100milliGRAM(s) Oral three times a day  digoxin     Tablet 0.125milliGRAM(s) Oral daily  furosemide    Tablet 40milliGRAM(s) Oral daily  carvedilol 9.375milliGRAM(s) Oral every 12 hours  heparin  Infusion 1200Unit(s)/Hr IV Continuous <Continuous>  enalapril 7.5milliGRAM(s) Oral every 12 hours  warfarin 5milliGRAM(s) Oral once    MEDICATIONS  (PRN):    Physical exam:   Gen- NAD. Apears comfortable  Resp- decreased breath sounds bilaterally. No w/r/r.  CV-  S1 and S2 normal   ABD- obese. Tender +gladys lsounds  EXT- No edema or calf tenderness.  Neuro A&O x3. No deficits.                            11.5   7.46  )-----------( 295      ( 26 May 2017 07:10 )             37.4     PT/INR - ( 26 May 2017 07:10 )   PT: 12.4 SEC;   INR: 1.10          PTT - ( 26 May 2017 07:10 )  PTT:66.6 SEC  05-26    137  |  97<L>  |  19  ----------------------------<  88  4.0   |  23  |  0.83    Ca    10.0      26 May 2017 07:10  Phos  4.1     05-26  Mg     2.2     05-26    TPro  8.8<H>  /  Alb  4.1  /  TBili  0.6  /  DBili  x   /  AST  25  /  ALT  37<H>  /  AlkPhos  94  05-26

## 2017-05-26 NOTE — PROGRESS NOTE ADULT - ASSESSMENT
25 y/o female with peripartum cardiomyopathy. Had episode of GONZALEZ while walking to bathroom today. Also has noticed non-productive cough. HR stable NSR 70s. BP stable 120/57.  Could this be a sign of decompensated HF?  -Will discuss with Dr. Mace about increasing Lasix to 40 mg BID.  -Continue with Coreg 9.375 mg BID, enalapril 7.5 mg qd, and digoxin 0.125 mg qd.   -Subtherpeutic INR, continue with coumadin dosing. Consider 7.5 mg po qd.

## 2017-05-26 NOTE — PROGRESS NOTE ADULT - SUBJECTIVE AND OBJECTIVE BOX
Patient is a 26y old  Female who presents with a chief complaint of chest pain and shortness of breath (19 May 2017 11:00)      OVERNIGHT EVENTS: Tele SR 70-80  Patient c/o of cough and SOB this AM for about less than a minute. Mrs. Denis states she suddenly got out of the bed without sitting at the edge of the bed when she had these symptoms.  Tele reflects an increased in HR between 7:30-7:41 am, possible hypotension related.    MEDICATIONS  (STANDING):    multivitamin 1Tablet(s) Oral daily  folic acid 1milliGRAM(s) Oral daily  digoxin     Tablet 0.125milliGRAM(s) Oral daily  furosemide    Tablet 40milliGRAM(s) Oral daily  carvedilol 9.375milliGRAM(s) Oral every 12 hours  heparin  Infusion 1200Unit(s)/Hr IV Continuous <Continuous>  enalapril 7.5milliGRAM(s) Oral every 12 hours  warfarin 5milliGRAM(s) Oral once    MEDICATIONS  (PRN):      Allergies    No Known Allergies    Intolerances        REVIEW OF SYSTEMS:  CONSTITUTIONAL: No fever, No chills,No fatigue,No myalgia,No Body ache  EYES: No eye pain, visual disturbances, or discharge  ENMT:  No ear pain, No nose bleed, No vertigo; No sinus or throat pain  NECK: No pain, No stiffness  RESPIRATORY: + cough, no wheezing, No  hemoptysis; + shortness of breath this am  CARDIOVASCULAR: No chest pain, palpitations, leg swelling  GASTROINTESTINAL: No abdominal or epigastric pain. No nausea, No vomiting; No diarrhea or constipation. [ ] BM  GENITOURINARY: No dysuria, No frequency, No urgency, No hematuria, or incontinence  NEUROLOGICAL: alert and oriented x 3,  No headaches, No dizziness, No numbness,  SKIN:   No itching, burning, rashes, or lesions   MUSCULOSKELETAL: No joint pain or swelling; No muscle pain, No back pain, No extremity pain  PSYCHIATRIC: No depression, anxiety, mood swings, or difficulty sleeping  ROS Unable to obtain due to - [ ] Dementia  [ ] Lethargy  REST OF REVIEW Of SYSTEM - [ ] Normal     Height (cm): 167.6 (05-18 @ 17:30)  Weight (kg): 110 (05-18 @ 19:00)  BMI (kg/m2): 39.2 (05-18 @ 19:00)  BSA (m2): 2.17 (05-18 @ 19:00)  Vital Signs Last 24 Hrs  T(C): 36.7, Max: 36.8 (05-25 @ 17:08)  T(F): 98, Max: 98.3 (05-25 @ 19:56)  HR: 85 (63 - 93)  BP: 120/57 (95/39 - 120/57)  BP(mean): 51 (42 - 74)  RR: 16 (16 - 22)  SpO2: 98% (97% - 99%)  [X ] room air   [ ] 02    I & Os for current day (as of 05-26 @ 12:39)  =============================================  IN: 840 ml / OUT: 200 ml / NET: 640 ml      PHYSICAL EXAM:  GENERAL:  No acute distresss, well appearing, [ ] Agitated, [ ] Lethargy, [ ] confused   HEAD:  normal  ENMT: normal  NECK:  normal    NERVOUS SYSTEM:  Alert & Oriented X3, no focal deficits [ ]Confusion  [ ] Encephalopathic [ ] Sedated [ ] Other  CHEST/LUNG: Clear to auscultation bilaterally,  [ ] decreased breath sounds at bases  [ ] wheezing   [ ] rhonchi  [ ] crackles  HEART:  Regular rate and rhythm, No murmurs, rubs, or gallops,  [ ] irregular   ABDOMEN:  soft, nontender, nondistended, positive bowel sounds   [ ] obese  EXTREMITIES: No clubbing, cyanosis or edema  SKIN: [ ] venous stasis skin changes    LABS:                        11.5   7.46  )-----------( 295      ( 26 May 2017 07:10 )             37.4     26 May 2017 07:10    137    |  97     |  19     ----------------------------<  88     4.0     |  23     |  0.83     Ca    10.0       26 May 2017 07:10  Phos  4.1       26 May 2017 07:10  Mg     2.2       26 May 2017 07:10    TPro  8.8    /  Alb  4.1    /  TBili  0.6    /  DBili  x      /  AST  25     /  ALT  37     /  AlkPhos  94     26 May 2017 07:10    PT/INR - ( 26 May 2017 07:10 )   PT: 12.4 SEC;   INR: 1.10          PTT - ( 26 May 2017 07:10 )  PTT:66.6 SEC  Hemoglobin A1C, Whole Blood: 5.9 % (05-19 @ 06:10)      CAPILLARY BLOOD GLUCOSE    Cultures          Cardiology Images:    CONCLUSIONS:  1. Normal mitral valve. Minimal mitral regurgitation.  2. Mild left ventricular enlargement.  3. Severe global left ventricular systolic dysfunction.  4. The right ventricle is not well visualized; grossly  normal right ventricular systolic function.  5. Estimated right ventricular systolic pressure equals 43  mm Hg, assuming right atrial pressure equals 10 mm Hg,  consistent with mild pulmonary hypertension.  *** Compared with echocardiogram of 5/18/2017, less mitral  regurgitation is noted on the current study.  In addition,  the estimated pulmonary artery systolic pressure has  decreased.    Care Discussed with [X] Consultants  [x] Patient  [X] Family  []   /   [ ]RN  DVT prophylaxis [ ] lovenox   [ ] subq heparin  [ ] coumadin  [ ] venodynes [ ] ambulating frequently at how risk for vte and no pharm         or  mechanical prophylaxis required    [ ] other   Advanced directive:    [ ]pt has hcp     [ ] pt declined to assign hcp  Discussed with pt @ bedside

## 2017-05-27 LAB
APTT BLD: 52.5 SEC — HIGH (ref 27.5–37.4)
BASOPHILS # BLD AUTO: 0.02 K/UL — SIGNIFICANT CHANGE UP (ref 0–0.2)
BASOPHILS NFR BLD AUTO: 0.3 % — SIGNIFICANT CHANGE UP (ref 0–2)
BUN SERPL-MCNC: 27 MG/DL — HIGH (ref 7–23)
CALCIUM SERPL-MCNC: 10.2 MG/DL — SIGNIFICANT CHANGE UP (ref 8.4–10.5)
CHLORIDE SERPL-SCNC: 99 MMOL/L — SIGNIFICANT CHANGE UP (ref 98–107)
CO2 SERPL-SCNC: 22 MMOL/L — SIGNIFICANT CHANGE UP (ref 22–31)
CREAT SERPL-MCNC: 0.92 MG/DL — SIGNIFICANT CHANGE UP (ref 0.5–1.3)
EOSINOPHIL # BLD AUTO: 0.06 K/UL — SIGNIFICANT CHANGE UP (ref 0–0.5)
EOSINOPHIL NFR BLD AUTO: 0.9 % — SIGNIFICANT CHANGE UP (ref 0–6)
GLUCOSE SERPL-MCNC: 80 MG/DL — SIGNIFICANT CHANGE UP (ref 70–99)
HCT VFR BLD CALC: 37.3 % — SIGNIFICANT CHANGE UP (ref 34.5–45)
HGB BLD-MCNC: 11.5 G/DL — SIGNIFICANT CHANGE UP (ref 11.5–15.5)
IMM GRANULOCYTES NFR BLD AUTO: 0 % — SIGNIFICANT CHANGE UP (ref 0–1.5)
INR BLD: 1.09 — SIGNIFICANT CHANGE UP (ref 0.88–1.17)
LYMPHOCYTES # BLD AUTO: 3.16 K/UL — SIGNIFICANT CHANGE UP (ref 1–3.3)
LYMPHOCYTES # BLD AUTO: 47.2 % — HIGH (ref 13–44)
MAGNESIUM SERPL-MCNC: 2.5 MG/DL — SIGNIFICANT CHANGE UP (ref 1.6–2.6)
MCHC RBC-ENTMCNC: 24.4 PG — LOW (ref 27–34)
MCHC RBC-ENTMCNC: 30.8 % — LOW (ref 32–36)
MCV RBC AUTO: 79 FL — LOW (ref 80–100)
MONOCYTES # BLD AUTO: 0.52 K/UL — SIGNIFICANT CHANGE UP (ref 0–0.9)
MONOCYTES NFR BLD AUTO: 7.8 % — SIGNIFICANT CHANGE UP (ref 2–14)
NEUTROPHILS # BLD AUTO: 2.94 K/UL — SIGNIFICANT CHANGE UP (ref 1.8–7.4)
NEUTROPHILS NFR BLD AUTO: 43.8 % — SIGNIFICANT CHANGE UP (ref 43–77)
PLATELET # BLD AUTO: 296 K/UL — SIGNIFICANT CHANGE UP (ref 150–400)
PMV BLD: 10.5 FL — SIGNIFICANT CHANGE UP (ref 7–13)
POTASSIUM SERPL-MCNC: 4.1 MMOL/L — SIGNIFICANT CHANGE UP (ref 3.5–5.3)
POTASSIUM SERPL-SCNC: 4.1 MMOL/L — SIGNIFICANT CHANGE UP (ref 3.5–5.3)
PROTHROM AB SERPL-ACNC: 12.2 SEC — SIGNIFICANT CHANGE UP (ref 9.8–13.1)
RBC # BLD: 4.72 M/UL — SIGNIFICANT CHANGE UP (ref 3.8–5.2)
RBC # FLD: 16.6 % — HIGH (ref 10.3–14.5)
SODIUM SERPL-SCNC: 139 MMOL/L — SIGNIFICANT CHANGE UP (ref 135–145)
WBC # BLD: 6.7 K/UL — SIGNIFICANT CHANGE UP (ref 3.8–10.5)
WBC # FLD AUTO: 6.7 K/UL — SIGNIFICANT CHANGE UP (ref 3.8–10.5)

## 2017-05-27 PROCEDURE — 99232 SBSQ HOSP IP/OBS MODERATE 35: CPT

## 2017-05-27 RX ORDER — FUROSEMIDE 40 MG
1 TABLET ORAL
Qty: 30 | Refills: 0 | OUTPATIENT
Start: 2017-05-27 | End: 2017-06-26

## 2017-05-27 RX ORDER — ENOXAPARIN SODIUM 100 MG/ML
110 INJECTION SUBCUTANEOUS
Qty: 7 | Refills: 0 | OUTPATIENT
Start: 2017-05-27 | End: 2017-06-03

## 2017-05-27 RX ORDER — DOCUSATE SODIUM 100 MG
1 CAPSULE ORAL
Qty: 0 | Refills: 0 | COMMUNITY
Start: 2017-05-27

## 2017-05-27 RX ORDER — SENNA PLUS 8.6 MG/1
2 TABLET ORAL
Qty: 0 | Refills: 0 | COMMUNITY
Start: 2017-05-27

## 2017-05-27 RX ORDER — CARVEDILOL PHOSPHATE 80 MG/1
3 CAPSULE, EXTENDED RELEASE ORAL
Qty: 180 | Refills: 0 | OUTPATIENT
Start: 2017-05-27 | End: 2017-06-26

## 2017-05-27 RX ORDER — WARFARIN SODIUM 2.5 MG/1
5 TABLET ORAL ONCE
Qty: 0 | Refills: 0 | Status: COMPLETED | OUTPATIENT
Start: 2017-05-27 | End: 2017-05-27

## 2017-05-27 RX ORDER — WARFARIN SODIUM 2.5 MG/1
1 TABLET ORAL
Qty: 30 | Refills: 0 | OUTPATIENT
Start: 2017-05-27 | End: 2017-06-26

## 2017-05-27 RX ORDER — DIGOXIN 250 MCG
1 TABLET ORAL
Qty: 30 | Refills: 0 | OUTPATIENT
Start: 2017-05-27 | End: 2017-06-26

## 2017-05-27 RX ORDER — ENOXAPARIN SODIUM 100 MG/ML
110 INJECTION SUBCUTANEOUS
Qty: 0 | Refills: 0 | Status: DISCONTINUED | OUTPATIENT
Start: 2017-05-27 | End: 2017-05-28

## 2017-05-27 RX ADMIN — Medication 10 MILLIGRAM(S): at 06:21

## 2017-05-27 RX ADMIN — Medication 100 MILLIGRAM(S): at 23:06

## 2017-05-27 RX ADMIN — WARFARIN SODIUM 5 MILLIGRAM(S): 2.5 TABLET ORAL at 17:09

## 2017-05-27 RX ADMIN — Medication 100 MILLIGRAM(S): at 15:26

## 2017-05-27 RX ADMIN — Medication 100 MILLIGRAM(S): at 05:17

## 2017-05-27 RX ADMIN — ENOXAPARIN SODIUM 110 MILLIGRAM(S): 100 INJECTION SUBCUTANEOUS at 23:06

## 2017-05-27 RX ADMIN — Medication 1 TABLET(S): at 11:56

## 2017-05-27 RX ADMIN — SENNA PLUS 2 TABLET(S): 8.6 TABLET ORAL at 23:06

## 2017-05-27 RX ADMIN — Medication 1 MILLIGRAM(S): at 11:56

## 2017-05-27 RX ADMIN — CARVEDILOL PHOSPHATE 9.38 MILLIGRAM(S): 80 CAPSULE, EXTENDED RELEASE ORAL at 05:18

## 2017-05-27 RX ADMIN — Medication 10 MILLIGRAM(S): at 15:25

## 2017-05-27 RX ADMIN — Medication 40 MILLIGRAM(S): at 05:17

## 2017-05-27 RX ADMIN — Medication 0.12 MILLIGRAM(S): at 05:17

## 2017-05-27 RX ADMIN — CARVEDILOL PHOSPHATE 9.38 MILLIGRAM(S): 80 CAPSULE, EXTENDED RELEASE ORAL at 20:52

## 2017-05-27 RX ADMIN — ENOXAPARIN SODIUM 110 MILLIGRAM(S): 100 INJECTION SUBCUTANEOUS at 12:56

## 2017-05-27 NOTE — PROGRESS NOTE ADULT - SUBJECTIVE AND OBJECTIVE BOX
chief complaint:  Patient feels well no complaints. No orthostatic BP changes noted.        Vital Signs Last 24 Hrs  T(C): 36.7, Max: 36.8 (05-26 @ 21:20)  T(F): 98, Max: 98.3 (05-26 @ 21:20)  HR: 67 (67 - 87)  BP: 114/42 (106/52 - 120/57)  BP(mean): --  RR: 16 (16 - 18)  SpO2: 99% (97% - 100%)  I&O's Detail          MEDICATIONS  (STANDING):  chlorhexidine 4% Liquid 1Application(s) Topical daily  multivitamin 1Tablet(s) Oral daily  folic acid 1milliGRAM(s) Oral daily  senna 2Tablet(s) Oral at bedtime  docusate sodium 100milliGRAM(s) Oral three times a day  digoxin     Tablet 0.125milliGRAM(s) Oral daily  furosemide    Tablet 40milliGRAM(s) Oral daily  carvedilol 9.375milliGRAM(s) Oral every 12 hours  heparin  Infusion 1200Unit(s)/Hr IV Continuous <Continuous>  enalapril 10milliGRAM(s) Oral two times a day    MEDICATIONS  (PRN):        Physical exam:   Gen- NAD, skin W&D  Resp- CTA B/L  CV- nl S1S2  ABD- soft, NT/ND  EXT- no C/C/E  Neuro- A&O X 3    EKG/ TELEM: NSR    5/27/17 LABS: pending                        11.5   7.46  )-----------( 295      ( 26 May 2017 07:10 )             37.4     PT/INR - ( 26 May 2017 07:10 )   PT: 12.4 SEC;   INR: 1.10          PTT - ( 26 May 2017 07:10 )  PTT:66.6 SEC  05-26    137  |  97<L>  |  19  ----------------------------<  88  4.0   |  23  |  0.83    Ca    10.0      26 May 2017 07:10  Phos  4.1     05-26  Mg     2.2     05-26    TPro  8.8<H>  /  Alb  4.1  /  TBili  0.6  /  DBili  x   /  AST  25  /  ALT  37<H>  /  AlkPhos  94  05-26  26 May 2017 07:10    137    |  97<L>  |  19     ----------------------------<  88     4.0     |  23     |  0.83     Ca    10.0       26 May 2017 07:10  Phos  4.1       26 May 2017 07:10  Mg     2.2       26 May 2017 07:10    TPro  8.8<H>  /  Alb  4.1    /  TBili  0.6    /  DBili  x      /  AST  25     /  ALT  37<H>  /  AlkPhos  94     26 May 2017 07:10        Assessment and Plan: 26F with gestational HTN, s/p recent urgent C section for preeclampsia p/w acute systolic heart failure likely secondary to peripartum cardiomyopathy. Patient now improved with diuresis and unloading.    1. ADSHF: patient improved at present, continue lasix, digoxin, coreg, and enalapril. EF 24% patient for D/C with Lifevest  2. A/C prophylaxis: continue heparin gtt with bridge to coumadin. INR goal 2-3. Consider coumadin 7.5 mg tonite. D/W patient possibility of discharging home on Lovenox with bridge to coumadin. Patient will consider and let us know.

## 2017-05-27 NOTE — CHART NOTE - NSCHARTNOTEFT_GEN_A_CORE
Called to evaluate Patient by Rn. Patient due to be discharged home today on Lovenox 110mg SQ BID to coumadin. INR currently subtherapeutic at 1.09. Patient urinated and noted blood on tissue paper for the first time since her  on 16 ( LMP 16). D/w CCU NP will hold off discharge to monitor patient overnight,  H/H 11.5/37.3 stable. Curbsided GYN team differential can be Patient's menses vs vaginal spotting post operatively, which can occur up to 6 weeks postop. Will continue to monitor closely for any further bleeding and will continue Lovenox to coumadin as discussed with team.

## 2017-05-28 ENCOUNTER — EMERGENCY (EMERGENCY)
Facility: HOSPITAL | Age: 27
LOS: 1 days | Discharge: HOME | End: 2017-05-28
Attending: EMERGENCY MEDICINE | Admitting: EMERGENCY MEDICINE
Payer: MEDICAID

## 2017-05-28 VITALS
OXYGEN SATURATION: 100 % | TEMPERATURE: 98 F | DIASTOLIC BLOOD PRESSURE: 59 MMHG | RESPIRATION RATE: 15 BRPM | HEART RATE: 104 BPM | SYSTOLIC BLOOD PRESSURE: 132 MMHG

## 2017-05-28 VITALS
RESPIRATION RATE: 18 BRPM | OXYGEN SATURATION: 100 % | SYSTOLIC BLOOD PRESSURE: 126 MMHG | DIASTOLIC BLOOD PRESSURE: 58 MMHG | TEMPERATURE: 98 F | HEART RATE: 74 BPM

## 2017-05-28 LAB
APTT BLD: 46.6 SEC — HIGH (ref 27.5–37.4)
BASOPHILS # BLD AUTO: 0.02 K/UL — SIGNIFICANT CHANGE UP (ref 0–0.2)
BASOPHILS NFR BLD AUTO: 0.3 % — SIGNIFICANT CHANGE UP (ref 0–2)
BUN SERPL-MCNC: 24 MG/DL — HIGH (ref 7–23)
CALCIUM SERPL-MCNC: 10.1 MG/DL — SIGNIFICANT CHANGE UP (ref 8.4–10.5)
CHLORIDE SERPL-SCNC: 100 MMOL/L — SIGNIFICANT CHANGE UP (ref 98–107)
CO2 SERPL-SCNC: 22 MMOL/L — SIGNIFICANT CHANGE UP (ref 22–31)
CREAT SERPL-MCNC: 0.85 MG/DL — SIGNIFICANT CHANGE UP (ref 0.5–1.3)
EOSINOPHIL # BLD AUTO: 0.08 K/UL — SIGNIFICANT CHANGE UP (ref 0–0.5)
EOSINOPHIL NFR BLD AUTO: 1.1 % — SIGNIFICANT CHANGE UP (ref 0–6)
GLUCOSE SERPL-MCNC: 80 MG/DL — SIGNIFICANT CHANGE UP (ref 70–99)
HCT VFR BLD CALC: 37.5 % — SIGNIFICANT CHANGE UP (ref 34.5–45)
HGB BLD-MCNC: 11.7 G/DL — SIGNIFICANT CHANGE UP (ref 11.5–15.5)
IMM GRANULOCYTES NFR BLD AUTO: 0.1 % — SIGNIFICANT CHANGE UP (ref 0–1.5)
INR BLD: 1.2 — HIGH (ref 0.88–1.17)
LYMPHOCYTES # BLD AUTO: 3.09 K/UL — SIGNIFICANT CHANGE UP (ref 1–3.3)
LYMPHOCYTES # BLD AUTO: 40.7 % — SIGNIFICANT CHANGE UP (ref 13–44)
MAGNESIUM SERPL-MCNC: 2.4 MG/DL — SIGNIFICANT CHANGE UP (ref 1.6–2.6)
MCHC RBC-ENTMCNC: 24.7 PG — LOW (ref 27–34)
MCHC RBC-ENTMCNC: 31.2 % — LOW (ref 32–36)
MCV RBC AUTO: 79.3 FL — LOW (ref 80–100)
MONOCYTES # BLD AUTO: 0.36 K/UL — SIGNIFICANT CHANGE UP (ref 0–0.9)
MONOCYTES NFR BLD AUTO: 4.7 % — SIGNIFICANT CHANGE UP (ref 2–14)
NEUTROPHILS # BLD AUTO: 4.04 K/UL — SIGNIFICANT CHANGE UP (ref 1.8–7.4)
NEUTROPHILS NFR BLD AUTO: 53.1 % — SIGNIFICANT CHANGE UP (ref 43–77)
PLATELET # BLD AUTO: 262 K/UL — SIGNIFICANT CHANGE UP (ref 150–400)
PMV BLD: 10.8 FL — SIGNIFICANT CHANGE UP (ref 7–13)
POTASSIUM SERPL-MCNC: 4.2 MMOL/L — SIGNIFICANT CHANGE UP (ref 3.5–5.3)
POTASSIUM SERPL-SCNC: 4.2 MMOL/L — SIGNIFICANT CHANGE UP (ref 3.5–5.3)
PROTHROM AB SERPL-ACNC: 13.5 SEC — HIGH (ref 9.8–13.1)
RBC # BLD: 4.73 M/UL — SIGNIFICANT CHANGE UP (ref 3.8–5.2)
RBC # FLD: 16.7 % — HIGH (ref 10.3–14.5)
SODIUM SERPL-SCNC: 139 MMOL/L — SIGNIFICANT CHANGE UP (ref 135–145)
WBC # BLD: 7.6 K/UL — SIGNIFICANT CHANGE UP (ref 3.8–10.5)
WBC # FLD AUTO: 7.6 K/UL — SIGNIFICANT CHANGE UP (ref 3.8–10.5)

## 2017-05-28 PROCEDURE — 99239 HOSP IP/OBS DSCHRG MGMT >30: CPT

## 2017-05-28 PROCEDURE — 99282 EMERGENCY DEPT VISIT SF MDM: CPT | Mod: 25

## 2017-05-28 RX ADMIN — Medication 0.12 MILLIGRAM(S): at 06:28

## 2017-05-28 RX ADMIN — Medication 10 MILLIGRAM(S): at 06:28

## 2017-05-28 RX ADMIN — ENOXAPARIN SODIUM 110 MILLIGRAM(S): 100 INJECTION SUBCUTANEOUS at 10:14

## 2017-05-28 RX ADMIN — Medication 100 MILLIGRAM(S): at 14:07

## 2017-05-28 RX ADMIN — Medication 40 MILLIGRAM(S): at 06:28

## 2017-05-28 RX ADMIN — CARVEDILOL PHOSPHATE 9.38 MILLIGRAM(S): 80 CAPSULE, EXTENDED RELEASE ORAL at 08:57

## 2017-05-28 RX ADMIN — Medication 1 TABLET(S): at 10:15

## 2017-05-28 RX ADMIN — Medication 1 MILLIGRAM(S): at 10:14

## 2017-05-28 NOTE — PROGRESS NOTE ADULT - ASSESSMENT
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 Patient now improved with diuresis and unloading.    1. ADSHF: patient improved at present, continue lasix, digoxin, coreg, and enalapril. EF 24% patient for D/C with Lifevest  2. A/C prophylaxis: Patient to be discharged on lovenox with coumadin bridge for a goal INR of 2-3.    3. Disposition: Patient can be discharged home today. Discharge plans were discussed at length with patient. She will follow up with Dr. Land for cardiology in 1 week, and will also need to follow up with heart failure specialist, Dr. Antoine and his team.  Patient understands the importance of the follow up.  Patient will be getting her coumadin dosed by her PCP doctor, however, she is unable to make an appointment with them due to Holiday Weekend.  She will need to get INR checked on Tuesday. This was also discussed with patient.

## 2017-05-28 NOTE — PROGRESS NOTE ADULT - SUBJECTIVE AND OBJECTIVE BOX
Date of Admission:  5/18/17  24H hour events: No overnight events, no episodes of bleeding    Vital Signs Last 24 Hrs  T(C): 36.7, Max: 36.8 (05-27 @ 20:51)  T(F): 98, Max: 98.2 (05-27 @ 20:51)  HR: 74 (74 - 93)  BP: 126/58 (103/69 - 126/58)  BP(mean): --  RR: 18 (17 - 18)  SpO2: 100% (99% - 100%)  I&O's Summary    I & Os for current day (as of 28 May 2017 10:09)  =============================================  IN: 400 ml / OUT: 0 ml / NET: 400 ml      MEDICATIONS:  digoxin     Tablet 0.125milliGRAM(s) Oral daily  furosemide    Tablet 40milliGRAM(s) Oral daily  carvedilol 9.375milliGRAM(s) Oral every 12 hours  enalapril 10milliGRAM(s) Oral two times a day  enoxaparin Injectable 110milliGRAM(s) SubCutaneous two times a day  senna 2Tablet(s) Oral at bedtime  docusate sodium 100milliGRAM(s) Oral three times a day  chlorhexidine 4% Liquid 1Application(s) Topical daily  multivitamin 1Tablet(s) Oral daily  folic acid 1milliGRAM(s) Oral daily      REVIEW OF SYSTEMS:  Complete 10point ROS negative.    PHYSICAL EXAM:  Appearance: Normal	  HEENT:   Normal oral mucosa, PERRL, EOMI	  Lymphatic: No lymphadenopathy  Cardiovascular: Normal S1 S2, No JVD, No murmurs, No edema  Respiratory: Lungs clear to auscultation	  Psychiatry: A & O x 3, Mood & affect appropriate  Gastrointestinal:  Soft, Non-tender, + BS	  Skin: No rashes, No ecchymoses, No cyanosis	  Neurologic: Non-focal  Extremities: Normal range of motion, No clubbing, cyanosis or edema  Vascular: Peripheral pulses palpable 2+ bilaterally        LABS:	 	  05-28    139  |  100  |  24<H>  ----------------------------<  80  4.2   |  22  |  0.85  Ca    10.1      28 May 2017 06:35  Mg     2.4     05-28    TELEMETRY: 	  SR  ECG:  	SR  RADIOLOGY:  ECHO:Ejection Fraction (Teicholtz): 25 %  CONCLUSIONS:  1. Normal mitral valve. Minimal mitral regurgitation.  2. Mild left ventricular enlargement.  3. Severe global left ventricular systolic dysfunction.  4. The right ventricle is not well visualized; grossly  normal right ventricular systolic function.  5. Estimated right ventricular systolic pressure equals 43  mm Hg, assuming right atrial pressure equals 10 mm Hg,  consistent with mild pulmonary hypertension.  *** Compared with echocardiogram of 5/18/2017, less mitral  regurgitation is noted on the current study.  In addition,  the estimated pulmonary artery systolic pressure has  decreased.  ------------------------------------------------------------------------  Confirmed on  5/23/2017 - 18:09:47 by Kendell Howe M.D.  ------------------------------------------------------------------------

## 2017-05-28 NOTE — ED ADULT NURSE NOTE - OBJECTIVE STATEMENT
pt alert and oriented, states that she was discharged from [facility] this morning after being diagnosed with postpartum cardiomyopathy, and put on coumadin, pt states she hit the top of her head on the fridge door and was told to come to ER with any head injuries. pt states very minor headache 2/10 and denies LOC, denies N/V.

## 2017-05-28 NOTE — ED ADULT TRIAGE NOTE - CHIEF COMPLAINT QUOTE
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  Pt hit head on refrigerator door and was told to come to ED if she hit her head.  Pt reports minor headache, no LOC.

## 2017-05-28 NOTE — PROGRESS NOTE ADULT - PROVIDER SPECIALTY LIST ADULT
CCU
Cardiology
Electrophysiology
GYN
Heart Failure
OB
Electrophysiology
OB

## 2017-05-29 NOTE — ED PROVIDER NOTE - ATTENDING CONTRIBUTION TO CARE
26f, pmhx postpartum cardiomyopathy, d/c today from [facility]. on lovenox bridge for coumadin. hit her head on refrigerator today when standing up. came to ED because was told to come to ED for any head trauma. pt has mild h/a, but no n/v, change in vision or gait, no weakness/numbness/parasthesias. no confusion or change in speech. no LOC. exam, vs wnl, nad. hs and lungs normal, no signs of trauma, cspine nt and from. cn II -XII normal. motor/sensation/tone/reflexes normal, tandem gait normal, drift neg, finger-nose neg. GCS 15. Given pt had no LOC, normal neuro exam, CT is not equired as no evidence of bleeding right now. However, given pt on a/c, I offered getting a CT but also told pt she can go home and rter if any s/s of bleed. She lives close by. CT was initially ordered but pt soon thereafter decided to go home. strict rter instructions given.  plan was discussed with pt in presence of .

## 2017-05-29 NOTE — ED PROVIDER NOTE - OBJECTIVE STATEMENT
26F dc earlier today after admission for post-partum cardiomyopathy presenting after hitting head at home. Pt was started on coumadin and instructed to come to ED if this occurred. States she was getting something out of refrigerator and hit head on freezer door. No LOC. Denies dizziness, n/v.

## 2017-05-29 NOTE — ED PROVIDER NOTE - MEDICAL DECISION MAKING DETAILS
26F recently started on coumadin (Lovenox bridge) p/w minor head trauma with no focal findings on neuro exam - will get CT head to assess, counselling provided

## 2017-06-06 ENCOUNTER — APPOINTMENT (OUTPATIENT)
Dept: CARDIOLOGY | Facility: CLINIC | Age: 27
End: 2017-06-06

## 2017-06-06 VITALS
RESPIRATION RATE: 14 BRPM | SYSTOLIC BLOOD PRESSURE: 112 MMHG | DIASTOLIC BLOOD PRESSURE: 75 MMHG | WEIGHT: 235 LBS | OXYGEN SATURATION: 100 % | HEIGHT: 65 IN | HEART RATE: 77 BPM | BODY MASS INDEX: 39.15 KG/M2

## 2017-06-06 DIAGNOSIS — Z82.49 FAMILY HISTORY OF ISCHEMIC HEART DISEASE AND OTHER DISEASES OF THE CIRCULATORY SYSTEM: ICD-10-CM

## 2017-06-06 DIAGNOSIS — Z84.89 FAMILY HISTORY OF OTHER SPECIFIED CONDITIONS: ICD-10-CM

## 2017-06-07 LAB
ALBUMIN SERPL ELPH-MCNC: 4.5 G/DL
ALP BLD-CCNC: 73 U/L
ALT SERPL-CCNC: 31 U/L
ANION GAP SERPL CALC-SCNC: 21 MMOL/L
AST SERPL-CCNC: 21 U/L
BILIRUB SERPL-MCNC: 0.3 MG/DL
BUN SERPL-MCNC: 30 MG/DL
CALCIUM SERPL-MCNC: 10.6 MG/DL
CHLORIDE SERPL-SCNC: 99 MMOL/L
CO2 SERPL-SCNC: 19 MMOL/L
CREAT SERPL-MCNC: 1.06 MG/DL
GLUCOSE SERPL-MCNC: 78 MG/DL
INR PPP: 1.49 RATIO
POTASSIUM SERPL-SCNC: 4.3 MMOL/L
PROT SERPL-MCNC: 9.2 G/DL
PT BLD: 17 SEC
SODIUM SERPL-SCNC: 139 MMOL/L

## 2017-06-13 ENCOUNTER — RX RENEWAL (OUTPATIENT)
Age: 27
End: 2017-06-13

## 2017-06-20 ENCOUNTER — NON-APPOINTMENT (OUTPATIENT)
Age: 27
End: 2017-06-20

## 2017-06-20 ENCOUNTER — APPOINTMENT (OUTPATIENT)
Dept: CARDIOLOGY | Facility: CLINIC | Age: 27
End: 2017-06-20

## 2017-06-20 VITALS
RESPIRATION RATE: 14 BRPM | HEIGHT: 66 IN | OXYGEN SATURATION: 99 % | DIASTOLIC BLOOD PRESSURE: 70 MMHG | HEART RATE: 73 BPM | SYSTOLIC BLOOD PRESSURE: 103 MMHG | WEIGHT: 235 LBS | BODY MASS INDEX: 37.77 KG/M2

## 2017-06-26 ENCOUNTER — OTHER (OUTPATIENT)
Age: 27
End: 2017-06-26

## 2017-07-11 ENCOUNTER — APPOINTMENT (OUTPATIENT)
Dept: CARDIOLOGY | Facility: CLINIC | Age: 27
End: 2017-07-11

## 2017-07-11 ENCOUNTER — NON-APPOINTMENT (OUTPATIENT)
Age: 27
End: 2017-07-11

## 2017-07-11 VITALS
RESPIRATION RATE: 16 BRPM | HEART RATE: 70 BPM | OXYGEN SATURATION: 100 % | WEIGHT: 224 LBS | SYSTOLIC BLOOD PRESSURE: 115 MMHG | DIASTOLIC BLOOD PRESSURE: 72 MMHG | BODY MASS INDEX: 36.15 KG/M2

## 2017-07-22 ENCOUNTER — INPATIENT (INPATIENT)
Facility: HOSPITAL | Age: 27
LOS: 1 days | Discharge: HOME | DRG: 776 | End: 2017-07-24
Attending: INTERNAL MEDICINE | Admitting: INTERNAL MEDICINE
Payer: MEDICAID

## 2017-07-22 VITALS
TEMPERATURE: 98 F | OXYGEN SATURATION: 99 % | HEART RATE: 85 BPM | DIASTOLIC BLOOD PRESSURE: 61 MMHG | RESPIRATION RATE: 20 BRPM | SYSTOLIC BLOOD PRESSURE: 116 MMHG

## 2017-07-22 DIAGNOSIS — O34.219 MATERNAL CARE FOR UNSPECIFIED TYPE SCAR FROM PREVIOUS CESAREAN DELIVERY: Chronic | ICD-10-CM

## 2017-07-22 DIAGNOSIS — R07.9 CHEST PAIN, UNSPECIFIED: ICD-10-CM

## 2017-07-22 DIAGNOSIS — E87.6 HYPOKALEMIA: ICD-10-CM

## 2017-07-22 LAB
ALBUMIN SERPL ELPH-MCNC: 4 G/DL — SIGNIFICANT CHANGE UP (ref 3.3–5)
ALP SERPL-CCNC: 70 U/L — SIGNIFICANT CHANGE UP (ref 40–120)
ALT FLD-CCNC: 18 U/L RC — SIGNIFICANT CHANGE UP (ref 10–45)
ANION GAP SERPL CALC-SCNC: 13 MMOL/L — SIGNIFICANT CHANGE UP (ref 5–17)
APTT BLD: 64.4 SEC — HIGH (ref 27.5–37.4)
AST SERPL-CCNC: 19 U/L — SIGNIFICANT CHANGE UP (ref 10–40)
BASOPHILS # BLD AUTO: 0 K/UL — SIGNIFICANT CHANGE UP (ref 0–0.2)
BASOPHILS NFR BLD AUTO: 0.6 % — SIGNIFICANT CHANGE UP (ref 0–2)
BILIRUB SERPL-MCNC: 0.3 MG/DL — SIGNIFICANT CHANGE UP (ref 0.2–1.2)
BUN SERPL-MCNC: 7 MG/DL — SIGNIFICANT CHANGE UP (ref 7–23)
CALCIUM SERPL-MCNC: 9.3 MG/DL — SIGNIFICANT CHANGE UP (ref 8.4–10.5)
CHLORIDE SERPL-SCNC: 102 MMOL/L — SIGNIFICANT CHANGE UP (ref 96–108)
CK MB BLD-MCNC: <0.8 % — SIGNIFICANT CHANGE UP (ref 0–3.5)
CK MB CFR SERPL CALC: <1 NG/ML — SIGNIFICANT CHANGE UP (ref 0–3.8)
CK SERPL-CCNC: 127 U/L — SIGNIFICANT CHANGE UP (ref 25–170)
CO2 SERPL-SCNC: 27 MMOL/L — SIGNIFICANT CHANGE UP (ref 22–31)
CREAT SERPL-MCNC: 0.8 MG/DL — SIGNIFICANT CHANGE UP (ref 0.5–1.3)
EOSINOPHIL # BLD AUTO: 0.1 K/UL — SIGNIFICANT CHANGE UP (ref 0–0.5)
EOSINOPHIL NFR BLD AUTO: 1.1 % — SIGNIFICANT CHANGE UP (ref 0–6)
GLUCOSE SERPL-MCNC: 90 MG/DL — SIGNIFICANT CHANGE UP (ref 70–99)
HCT VFR BLD CALC: 32.4 % — LOW (ref 34.5–45)
HGB BLD-MCNC: 10.7 G/DL — LOW (ref 11.5–15.5)
INR BLD: 3.79 RATIO — HIGH (ref 0.88–1.16)
LYMPHOCYTES # BLD AUTO: 2.8 K/UL — SIGNIFICANT CHANGE UP (ref 1–3.3)
LYMPHOCYTES # BLD AUTO: 36.8 % — SIGNIFICANT CHANGE UP (ref 13–44)
MCHC RBC-ENTMCNC: 26.4 PG — LOW (ref 27–34)
MCHC RBC-ENTMCNC: 33 GM/DL — SIGNIFICANT CHANGE UP (ref 32–36)
MCV RBC AUTO: 80.1 FL — SIGNIFICANT CHANGE UP (ref 80–100)
MONOCYTES # BLD AUTO: 0.6 K/UL — SIGNIFICANT CHANGE UP (ref 0–0.9)
MONOCYTES NFR BLD AUTO: 8 % — SIGNIFICANT CHANGE UP (ref 2–14)
NEUTROPHILS # BLD AUTO: 4.1 K/UL — SIGNIFICANT CHANGE UP (ref 1.8–7.4)
NEUTROPHILS NFR BLD AUTO: 53.5 % — SIGNIFICANT CHANGE UP (ref 43–77)
NT-PROBNP SERPL-SCNC: 138 PG/ML — SIGNIFICANT CHANGE UP (ref 0–300)
PLATELET # BLD AUTO: 325 K/UL — SIGNIFICANT CHANGE UP (ref 150–400)
POTASSIUM SERPL-MCNC: 3.3 MMOL/L — LOW (ref 3.5–5.3)
POTASSIUM SERPL-SCNC: 3.3 MMOL/L — LOW (ref 3.5–5.3)
PROT SERPL-MCNC: 8.1 G/DL — SIGNIFICANT CHANGE UP (ref 6–8.3)
PROTHROM AB SERPL-ACNC: 42.4 SEC — HIGH (ref 9.8–12.7)
RBC # BLD: 4.05 M/UL — SIGNIFICANT CHANGE UP (ref 3.8–5.2)
RBC # FLD: 15.2 % — HIGH (ref 10.3–14.5)
SODIUM SERPL-SCNC: 142 MMOL/L — SIGNIFICANT CHANGE UP (ref 135–145)
TROPONIN T SERPL-MCNC: <0.01 NG/ML — SIGNIFICANT CHANGE UP (ref 0–0.06)
WBC # BLD: 7.7 K/UL — SIGNIFICANT CHANGE UP (ref 3.8–10.5)
WBC # FLD AUTO: 7.7 K/UL — SIGNIFICANT CHANGE UP (ref 3.8–10.5)

## 2017-07-22 PROCEDURE — 93010 ELECTROCARDIOGRAM REPORT: CPT

## 2017-07-22 PROCEDURE — 99285 EMERGENCY DEPT VISIT HI MDM: CPT | Mod: 25

## 2017-07-22 PROCEDURE — 99223 1ST HOSP IP/OBS HIGH 75: CPT

## 2017-07-22 PROCEDURE — 71020: CPT | Mod: 26

## 2017-07-22 RX ORDER — ASPIRIN/CALCIUM CARB/MAGNESIUM 324 MG
324 TABLET ORAL ONCE
Qty: 0 | Refills: 0 | Status: COMPLETED | OUTPATIENT
Start: 2017-07-22 | End: 2017-07-22

## 2017-07-22 RX ORDER — DIGOXIN 250 MCG
1 TABLET ORAL
Qty: 0 | Refills: 0 | COMMUNITY

## 2017-07-22 RX ORDER — CARVEDILOL PHOSPHATE 80 MG/1
1 CAPSULE, EXTENDED RELEASE ORAL
Qty: 0 | Refills: 0 | COMMUNITY

## 2017-07-22 RX ORDER — SODIUM CHLORIDE 9 MG/ML
3 INJECTION INTRAMUSCULAR; INTRAVENOUS; SUBCUTANEOUS ONCE
Qty: 0 | Refills: 0 | Status: COMPLETED | OUTPATIENT
Start: 2017-07-22 | End: 2017-07-22

## 2017-07-22 RX ORDER — POTASSIUM CHLORIDE 20 MEQ
20 PACKET (EA) ORAL
Qty: 0 | Refills: 0 | Status: COMPLETED | OUTPATIENT
Start: 2017-07-22 | End: 2017-07-23

## 2017-07-22 RX ORDER — FUROSEMIDE 40 MG
1 TABLET ORAL
Qty: 0 | Refills: 0 | COMMUNITY

## 2017-07-22 RX ORDER — CARVEDILOL PHOSPHATE 80 MG/1
6.25 CAPSULE, EXTENDED RELEASE ORAL EVERY 12 HOURS
Qty: 0 | Refills: 0 | Status: DISCONTINUED | OUTPATIENT
Start: 2017-07-22 | End: 2017-07-24

## 2017-07-22 RX ORDER — ACETAMINOPHEN 500 MG
1000 TABLET ORAL ONCE
Qty: 0 | Refills: 0 | Status: COMPLETED | OUTPATIENT
Start: 2017-07-22 | End: 2017-07-22

## 2017-07-22 RX ORDER — ACETAMINOPHEN 500 MG
650 TABLET ORAL EVERY 6 HOURS
Qty: 0 | Refills: 0 | Status: DISCONTINUED | OUTPATIENT
Start: 2017-07-22 | End: 2017-07-24

## 2017-07-22 RX ORDER — FUROSEMIDE 40 MG
40 TABLET ORAL DAILY
Qty: 0 | Refills: 0 | Status: DISCONTINUED | OUTPATIENT
Start: 2017-07-22 | End: 2017-07-24

## 2017-07-22 RX ADMIN — Medication 324 MILLIGRAM(S): at 18:15

## 2017-07-22 RX ADMIN — CARVEDILOL PHOSPHATE 6.25 MILLIGRAM(S): 80 CAPSULE, EXTENDED RELEASE ORAL at 22:22

## 2017-07-22 RX ADMIN — Medication 20 MILLIEQUIVALENT(S): at 22:22

## 2017-07-22 RX ADMIN — Medication 10 MILLIGRAM(S): at 22:22

## 2017-07-22 RX ADMIN — SODIUM CHLORIDE 3 MILLILITER(S): 9 INJECTION INTRAMUSCULAR; INTRAVENOUS; SUBCUTANEOUS at 18:15

## 2017-07-22 NOTE — H&P ADULT - PROBLEM SELECTOR PLAN 2
See above.  Daily weights.  On Coumadin for prevention of  LV thrombus but dose tonight held with INR to 3.8-- no present evidence of bleeding.  Patient aware of risks/benefits of full AC and agrees to continue as such.  Target systolic BP should be to 90 to 100 Torr as tolerated given LV dysfunction.

## 2017-07-22 NOTE — H&P ADULT - ATTENDING COMMENTS
NIGHT HOSPITALIST:  Patient / family aware of course and agrees with plan/care as above.  Long term prognosis is guarded.  Emotional support provided to patient.   Care reviewed with covering NP.  Care assumed by Dr. Hoyt.

## 2017-07-22 NOTE — H&P ADULT - PROBLEM SELECTOR PLAN 1
See above.  Setting of post partum cardiomyopathy.  Serial enzymes.  Repeat echo.  Patient not compliant with wearing her Life Vest.

## 2017-07-22 NOTE — ED ADULT NURSE NOTE - PMH
No pertinent past medical history    Pre-eclampsia complicating hypertension    Pregnancy induced hypertension, antepartum

## 2017-07-22 NOTE — H&P ADULT - ASSESSMENT
NIGHT HOSPITALIST:  Presentation of patient with LEFT shoulder, chest pain with intermittent radiation to the neck for a week>>still suspect cardiac equivalent in the setting of severe LV dysfunction due to post partum cardiomyopathy>>clinical presentation at present is not consistent with an acute aortic or carotid pathology>>will HOLD tonight's Coumadin dose with INR as above.  Will supplement K+ to target 4.0>>will HOLD Digoxin pending level.  Serial enzymes and will repeat echo given new symptoms.  Prognosis long term is guarded.  Emotional support provided to patient.  Patient/ family aware of course and agree with plan/care as above.

## 2017-07-22 NOTE — ED PROVIDER NOTE - ATTENDING CONTRIBUTION TO CARE
I have examined and evaluated this patient with the above resident or PA, and agree with the documented clinical history, exam and plan.   Briefly: 27yo F, recently dx'd with postpartum cardiomyopathy (may 18th 2017 - delivered on April 11, 2017), EF <25%, wears life vest, c/o left shoulder pain; no relation of pain to movement, position, exertion or po intake.  No SOB, no chest pain.  Patient is well appearing and in NAD, cardiac: s1/s2, lungs ctabl, no shoulder deformity or tenderness, no cervical spinal tenderness.  No h/o trauma. Initial ECG shows NSR without st elevations. Patient at high risk for cardiac etiology of pain given clinical history.  Will obtain CXR, labs (including trop) and d/w her cardiologist; likely admission for ongoing evaluation/management.

## 2017-07-22 NOTE — ED PROVIDER NOTE - MEDICAL DECISION MAKING DETAILS
25yo F, recently dx'd with postpartum cardiomyopathy (may 18th 2017), EF <25%, wears life vest, c/o left shoulder pain, and chest heaviness.  ECG shows NSR without st elevations.  Unclear etiology, but given significant cardiomyopathy recently developed, suspect cardiac etiology: will obtain cbc, cmp, trop, pro-BNP and cxr.  May require admission for further management.

## 2017-07-22 NOTE — H&P ADULT - HISTORY OF PRESENT ILLNESS
NIGHT HOSPITALIST:  Patient UNKNOWN to me previously, assigned to me at this point via the ER and by Dr. Hoyt to admit  this 27 y/o F--patient with  history of gestational HTN with past pregnancy previously on labetalol as an outpatient, S/P non-elective C -section for preeclampsia in 2017, with an admission to [facility] in May, 2017 with an admission to the CCU for management of blood pressure elevation with parenteral Rx with echo with severe global systolic LV dysfunction with EF% of 24 with a diagnosis of postpartum cardiomyopathy, and initiated on Coumadin in the setting of prevention of LV thrombus, with discharge from [facility] on May 28, 2017, NIGHT HOSPITALIST:  Patient UNKNOWN to me previously, assigned to me at this point via the ER and by Dr. Hoyt to admit  this 25 y/o F--patient with  history of gestational HTN with past pregnancy previously on labetalol as an outpatient, S/P non-elective C -section for preeclampsia in 2017, with an admission to [facility] in May, 2017 with an admission to the CCU for management of blood pressure elevation with parenteral Rx with echo with severe global systolic LV dysfunction with EF% of 24 with a diagnosis of postpartum cardiomyopathy, and initiated on Coumadin in the setting of prevention of LV thrombus, with discharge from [facility] on May 28, 2017, with the patient self referring to [place] following one week of intermittent LEFT shoulder  and LEFT chest pressure with occasional radiation to the neck.  No chest pain/pressure at present.  No dyspnea.  No neck pain/no tearing neck pain.  No back pain, no tearing back pain.  No abdominal pain, no red blood per rectum or melena.  No fever, no chills, no rigors.  No hematuria.  No dysuria.  No vaginal spotting.  No weight gain or anorexia.  Remaining review of systems not contributory.

## 2017-07-22 NOTE — ED PROVIDER NOTE - OBJECTIVE STATEMENT
27yo F, recently dx'd with postpartum cardiomyopathy (may 18th 2017), EF <25%, wears life vest, c/o left shoulder pain, for the past 1 week, becoming more intense; intermittent, described as dull / aching sensation, nonradiating, no relation to activity/exertion, eating, position or movement.  Also noted some heaviness in her chest since yesterday; unrelated to activity, position, eating.    Cardio: Kaiser Aguilera 25yo F, recently dx'd with postpartum cardiomyopathy (may 18th 2017 - delivered on April 11, 2017), EF <25%, wears life vest, c/o left shoulder pain, for the past 1 week, becoming more intense; intermittent, described as dull / aching sensation, nonradiating, no relation to activity/exertion, eating, position or movement.  Also noted some heaviness in her chest since yesterday; unrelated to activity, position, eating. No SOB.     Cardio: Kaiser Aguilera

## 2017-07-22 NOTE — H&P ADULT - NSHPPHYSICALEXAM_GEN_ALL_CORE
Physical exam with a young, obese, non-toxic F in no acute distress.  BP  116/61  99% on RA.  HR  85.  Afebrile.  HEENT, PERRL, EOMI, neck supple with no tenderness., chest clear cor s1 s2, declined breast exam.  Abdomen soft normal bowel sounds, nontender nondistended.  Ext with no cyanosis with no joint effusion and full range of motion of the LEFT shoulder.  Trace B/L pedal oedema.  Neurologic exam AxOx3.   Speech fluent.  Cognition intact.  UE/LE 5/5.

## 2017-07-22 NOTE — H&P ADULT - NSHPLABSRESULTS_GEN_ALL_CORE
WBC 7.7.  hgb 10.7.  Platelets of 325K.  INR 3.8.  K+ 3.3>>not supplemented by the ER.  Random glucose of 90.  Cr 0.8.  alb 4.0.  troponin nil x 1.  BNP  138.  Chest radiograph reviewed with no infiltrate or effusion.  Cardiomegaly noted.  EKG tracing reviewed with NSR at 80.  No U/A sent.

## 2017-07-22 NOTE — ED ADULT NURSE NOTE - OBJECTIVE STATEMENT
25 yo female A&OX3 presents to the ED with the c/o l shoulder pain. Pt states that she has been having pain x 1 week, progressively getting worst. + chest pressure and discomfort, + palpitations. Pt has hx of cardiomyopathy, has life vest on. According to pt she has post partum cardiomyopathy since may 2017. Denies any sob, no cough, no dizziness or headache. No n/v. Lungs clear, equal b/l, no sob or cough. MAEX4

## 2017-07-23 LAB
ANION GAP SERPL CALC-SCNC: 13 MMOL/L — SIGNIFICANT CHANGE UP (ref 5–17)
APPEARANCE UR: CLEAR — SIGNIFICANT CHANGE UP
APTT BLD: 61 SEC — HIGH (ref 27.5–37.4)
BACTERIA # UR AUTO: ABNORMAL /HPF
BASOPHILS # BLD AUTO: 0.02 K/UL — SIGNIFICANT CHANGE UP (ref 0–0.2)
BASOPHILS NFR BLD AUTO: 0.3 % — SIGNIFICANT CHANGE UP (ref 0–2)
BILIRUB UR-MCNC: NEGATIVE — SIGNIFICANT CHANGE UP
BUN SERPL-MCNC: 9 MG/DL — SIGNIFICANT CHANGE UP (ref 7–23)
CALCIUM SERPL-MCNC: 9.5 MG/DL — SIGNIFICANT CHANGE UP (ref 8.4–10.5)
CHLORIDE SERPL-SCNC: 105 MMOL/L — SIGNIFICANT CHANGE UP (ref 96–108)
CK MB BLD-MCNC: 0.9 % — SIGNIFICANT CHANGE UP (ref 0–3.5)
CK MB CFR SERPL CALC: 1 NG/ML — SIGNIFICANT CHANGE UP (ref 0–3.8)
CK SERPL-CCNC: 113 U/L — SIGNIFICANT CHANGE UP (ref 25–170)
CO2 SERPL-SCNC: 22 MMOL/L — SIGNIFICANT CHANGE UP (ref 22–31)
COLOR SPEC: COLORLESS — SIGNIFICANT CHANGE UP
COMMENT - URINE: SIGNIFICANT CHANGE UP
CREAT SERPL-MCNC: 0.81 MG/DL — SIGNIFICANT CHANGE UP (ref 0.5–1.3)
DIFF PNL FLD: NEGATIVE — SIGNIFICANT CHANGE UP
DIGOXIN SERPL-MCNC: 0.6 NG/ML — LOW (ref 0.8–2)
EOSINOPHIL # BLD AUTO: 0.14 K/UL — SIGNIFICANT CHANGE UP (ref 0–0.5)
EOSINOPHIL NFR BLD AUTO: 2 % — SIGNIFICANT CHANGE UP (ref 0–6)
EPI CELLS # UR: SIGNIFICANT CHANGE UP /HPF
GLUCOSE SERPL-MCNC: 105 MG/DL — HIGH (ref 70–99)
GLUCOSE UR QL: NEGATIVE — SIGNIFICANT CHANGE UP
HCT VFR BLD CALC: 31.9 % — LOW (ref 34.5–45)
HGB BLD-MCNC: 9.9 G/DL — LOW (ref 11.5–15.5)
IMM GRANULOCYTES NFR BLD AUTO: 0.1 % — SIGNIFICANT CHANGE UP (ref 0–1.5)
INR BLD: 3.6 RATIO — HIGH (ref 0.88–1.16)
KETONES UR-MCNC: NEGATIVE — SIGNIFICANT CHANGE UP
LEUKOCYTE ESTERASE UR-ACNC: NEGATIVE — SIGNIFICANT CHANGE UP
LYMPHOCYTES # BLD AUTO: 2.95 K/UL — SIGNIFICANT CHANGE UP (ref 1–3.3)
LYMPHOCYTES # BLD AUTO: 42.5 % — SIGNIFICANT CHANGE UP (ref 13–44)
MCHC RBC-ENTMCNC: 24.1 PG — LOW (ref 27–34)
MCHC RBC-ENTMCNC: 31 GM/DL — LOW (ref 32–36)
MCV RBC AUTO: 77.6 FL — LOW (ref 80–100)
MONOCYTES # BLD AUTO: 0.58 K/UL — SIGNIFICANT CHANGE UP (ref 0–0.9)
MONOCYTES NFR BLD AUTO: 8.4 % — SIGNIFICANT CHANGE UP (ref 2–14)
NEUTROPHILS # BLD AUTO: 3.24 K/UL — SIGNIFICANT CHANGE UP (ref 1.8–7.4)
NEUTROPHILS NFR BLD AUTO: 46.7 % — SIGNIFICANT CHANGE UP (ref 43–77)
NITRITE UR-MCNC: NEGATIVE — SIGNIFICANT CHANGE UP
PH UR: 6.5 — SIGNIFICANT CHANGE UP (ref 5–8)
PLATELET # BLD AUTO: 297 K/UL — SIGNIFICANT CHANGE UP (ref 150–400)
POTASSIUM SERPL-MCNC: 3.2 MMOL/L — LOW (ref 3.5–5.3)
POTASSIUM SERPL-MCNC: 4.2 MMOL/L — SIGNIFICANT CHANGE UP (ref 3.5–5.3)
POTASSIUM SERPL-SCNC: 3.2 MMOL/L — LOW (ref 3.5–5.3)
POTASSIUM SERPL-SCNC: 4.2 MMOL/L — SIGNIFICANT CHANGE UP (ref 3.5–5.3)
PROT UR-MCNC: NEGATIVE — SIGNIFICANT CHANGE UP
PROTHROM AB SERPL-ACNC: 41.8 SEC — HIGH (ref 10–13.1)
RBC # BLD: 4.11 M/UL — SIGNIFICANT CHANGE UP (ref 3.8–5.2)
RBC # FLD: 16.4 % — HIGH (ref 10.3–14.5)
RBC CASTS # UR COMP ASSIST: SIGNIFICANT CHANGE UP /HPF (ref 0–2)
SODIUM SERPL-SCNC: 140 MMOL/L — SIGNIFICANT CHANGE UP (ref 135–145)
SP GR SPEC: 1.01 — LOW (ref 1.01–1.02)
TROPONIN T SERPL-MCNC: <0.01 NG/ML — SIGNIFICANT CHANGE UP (ref 0–0.06)
UROBILINOGEN FLD QL: NEGATIVE — SIGNIFICANT CHANGE UP
WBC # BLD: 6.94 K/UL — SIGNIFICANT CHANGE UP (ref 3.8–10.5)
WBC # FLD AUTO: 6.94 K/UL — SIGNIFICANT CHANGE UP (ref 3.8–10.5)
WBC UR QL: SIGNIFICANT CHANGE UP /HPF (ref 0–5)

## 2017-07-23 PROCEDURE — 93306 TTE W/DOPPLER COMPLETE: CPT | Mod: 26

## 2017-07-23 RX ORDER — DIGOXIN 250 MCG
0.12 TABLET ORAL DAILY
Qty: 0 | Refills: 0 | Status: DISCONTINUED | OUTPATIENT
Start: 2017-07-23 | End: 2017-07-24

## 2017-07-23 RX ORDER — POTASSIUM CHLORIDE 20 MEQ
40 PACKET (EA) ORAL
Qty: 0 | Refills: 0 | Status: COMPLETED | OUTPATIENT
Start: 2017-07-23 | End: 2017-07-23

## 2017-07-23 RX ADMIN — Medication 10 MILLIGRAM(S): at 17:08

## 2017-07-23 RX ADMIN — Medication 40 MILLIGRAM(S): at 06:05

## 2017-07-23 RX ADMIN — Medication 20 MILLIEQUIVALENT(S): at 00:03

## 2017-07-23 RX ADMIN — Medication 40 MILLIEQUIVALENT(S): at 04:12

## 2017-07-23 RX ADMIN — Medication 0.12 MILLIGRAM(S): at 17:45

## 2017-07-23 RX ADMIN — CARVEDILOL PHOSPHATE 6.25 MILLIGRAM(S): 80 CAPSULE, EXTENDED RELEASE ORAL at 20:30

## 2017-07-23 RX ADMIN — CARVEDILOL PHOSPHATE 6.25 MILLIGRAM(S): 80 CAPSULE, EXTENDED RELEASE ORAL at 08:10

## 2017-07-23 RX ADMIN — Medication 1 TABLET(S): at 11:57

## 2017-07-23 RX ADMIN — Medication 40 MILLIEQUIVALENT(S): at 02:12

## 2017-07-23 RX ADMIN — Medication 10 MILLIGRAM(S): at 06:05

## 2017-07-24 ENCOUNTER — TRANSCRIPTION ENCOUNTER (OUTPATIENT)
Age: 27
End: 2017-07-24

## 2017-07-24 VITALS — WEIGHT: 271.83 LBS

## 2017-07-24 LAB
ANION GAP SERPL CALC-SCNC: 11 MMOL/L — SIGNIFICANT CHANGE UP (ref 5–17)
APTT BLD: 54.1 SEC — HIGH (ref 27.5–37.4)
BUN SERPL-MCNC: 9 MG/DL — SIGNIFICANT CHANGE UP (ref 7–23)
CALCIUM SERPL-MCNC: 9.5 MG/DL — SIGNIFICANT CHANGE UP (ref 8.4–10.5)
CHLORIDE SERPL-SCNC: 104 MMOL/L — SIGNIFICANT CHANGE UP (ref 96–108)
CK MB BLD-MCNC: 0.4 % — SIGNIFICANT CHANGE UP (ref 0–3.5)
CK MB CFR SERPL CALC: 0.4 NG/ML — SIGNIFICANT CHANGE UP (ref 0–10)
CK SERPL-CCNC: 114 U/L — SIGNIFICANT CHANGE UP (ref 25–170)
CO2 SERPL-SCNC: 23 MMOL/L — SIGNIFICANT CHANGE UP (ref 22–31)
CREAT SERPL-MCNC: 0.7 MG/DL — SIGNIFICANT CHANGE UP (ref 0.5–1.3)
GLUCOSE SERPL-MCNC: 96 MG/DL — SIGNIFICANT CHANGE UP (ref 70–99)
HCT VFR BLD CALC: 33.5 % — LOW (ref 34.5–45)
HGB BLD-MCNC: 10.5 G/DL — LOW (ref 11.5–15.5)
INR BLD: 3 RATIO — HIGH (ref 0.88–1.16)
MCHC RBC-ENTMCNC: 24.4 PG — LOW (ref 27–34)
MCHC RBC-ENTMCNC: 31.3 GM/DL — LOW (ref 32–36)
MCV RBC AUTO: 77.7 FL — LOW (ref 80–100)
PLATELET # BLD AUTO: 295 K/UL — SIGNIFICANT CHANGE UP (ref 150–400)
POTASSIUM SERPL-MCNC: 3.9 MMOL/L — SIGNIFICANT CHANGE UP (ref 3.5–5.3)
POTASSIUM SERPL-SCNC: 3.9 MMOL/L — SIGNIFICANT CHANGE UP (ref 3.5–5.3)
PROTHROM AB SERPL-ACNC: 34.7 SEC — HIGH (ref 10–13.1)
RBC # BLD: 4.31 M/UL — SIGNIFICANT CHANGE UP (ref 3.8–5.2)
RBC # FLD: 16.5 % — HIGH (ref 10.3–14.5)
SODIUM SERPL-SCNC: 138 MMOL/L — SIGNIFICANT CHANGE UP (ref 135–145)
TROPONIN T SERPL-MCNC: <0.01 NG/ML — SIGNIFICANT CHANGE UP
WBC # BLD: 7.52 K/UL — SIGNIFICANT CHANGE UP (ref 3.8–10.5)
WBC # FLD AUTO: 7.52 K/UL — SIGNIFICANT CHANGE UP (ref 3.8–10.5)

## 2017-07-24 PROCEDURE — 83880 ASSAY OF NATRIURETIC PEPTIDE: CPT

## 2017-07-24 PROCEDURE — 84484 ASSAY OF TROPONIN QUANT: CPT

## 2017-07-24 PROCEDURE — 85730 THROMBOPLASTIN TIME PARTIAL: CPT

## 2017-07-24 PROCEDURE — 84132 ASSAY OF SERUM POTASSIUM: CPT

## 2017-07-24 PROCEDURE — 71046 X-RAY EXAM CHEST 2 VIEWS: CPT

## 2017-07-24 PROCEDURE — 83605 ASSAY OF LACTIC ACID: CPT

## 2017-07-24 PROCEDURE — 85610 PROTHROMBIN TIME: CPT

## 2017-07-24 PROCEDURE — 99285 EMERGENCY DEPT VISIT HI MDM: CPT | Mod: 25

## 2017-07-24 PROCEDURE — 84295 ASSAY OF SERUM SODIUM: CPT

## 2017-07-24 PROCEDURE — 81001 URINALYSIS AUTO W/SCOPE: CPT

## 2017-07-24 PROCEDURE — 93306 TTE W/DOPPLER COMPLETE: CPT

## 2017-07-24 PROCEDURE — 82330 ASSAY OF CALCIUM: CPT

## 2017-07-24 PROCEDURE — 80162 ASSAY OF DIGOXIN TOTAL: CPT

## 2017-07-24 PROCEDURE — 85027 COMPLETE CBC AUTOMATED: CPT

## 2017-07-24 PROCEDURE — 82550 ASSAY OF CK (CPK): CPT

## 2017-07-24 PROCEDURE — 85014 HEMATOCRIT: CPT

## 2017-07-24 PROCEDURE — 82435 ASSAY OF BLOOD CHLORIDE: CPT

## 2017-07-24 PROCEDURE — 93005 ELECTROCARDIOGRAM TRACING: CPT

## 2017-07-24 PROCEDURE — 80048 BASIC METABOLIC PNL TOTAL CA: CPT

## 2017-07-24 PROCEDURE — 82803 BLOOD GASES ANY COMBINATION: CPT

## 2017-07-24 PROCEDURE — 82553 CREATINE MB FRACTION: CPT

## 2017-07-24 PROCEDURE — 82947 ASSAY GLUCOSE BLOOD QUANT: CPT

## 2017-07-24 PROCEDURE — 80053 COMPREHEN METABOLIC PANEL: CPT

## 2017-07-24 RX ORDER — WARFARIN SODIUM 2.5 MG/1
1 TABLET ORAL
Qty: 0 | Refills: 0 | COMMUNITY

## 2017-07-24 RX ORDER — ACETAMINOPHEN 500 MG
2 TABLET ORAL
Qty: 0 | Refills: 0 | COMMUNITY
Start: 2017-07-24

## 2017-07-24 RX ADMIN — Medication 40 MILLIGRAM(S): at 05:29

## 2017-07-24 RX ADMIN — CARVEDILOL PHOSPHATE 6.25 MILLIGRAM(S): 80 CAPSULE, EXTENDED RELEASE ORAL at 05:28

## 2017-07-24 RX ADMIN — Medication 0.12 MILLIGRAM(S): at 05:29

## 2017-07-24 RX ADMIN — Medication 1 TABLET(S): at 12:22

## 2017-07-24 RX ADMIN — Medication 10 MILLIGRAM(S): at 05:29

## 2017-07-24 NOTE — DIETITIAN INITIAL EVALUATION ADULT. - OTHER INFO
Pt seen for consult: cardiomyopathy education. Pt currently reports good appetite/intake, denies N/V/diarrhea/constipation with last BM yesterday 7/23. Pt admits to weight change of ~ 50 pounds over the past 2 months, with immediately post-partum wt at 272 pounds and diagnosed with post-partum cardiomyopathy, s/p diuresis with lasix now noted as 222.2 pounds, no edema at this time. Pt aware of low-sodium diet recommendations, states she is aware of her diagnosis and the importance of limiting dietary sodium, is able to read nutrition labels, and has been preparing healthier meals at home to help control her sodium intake at home. Pt amenable to written heart-healthy materials to take home and review at her leisure.

## 2017-07-24 NOTE — DISCHARGE NOTE ADULT - PLAN OF CARE
resolved Follow up with your cardiologist within 1 week  Continue Cardiac Medicine Weigh yourself daily.  If you gain 3lbs in 3 days, or 5lbs in a week call your Health Care Provider.  Do not eat or drink foods containing more than 2000mg of salt (sodium) in your diet every day.  Call your Health Care Provider if you have any swelling or increased swelling in your feet, ankles, and/or stomach.  Take all of your medication as directed.  If you become dizzy call your Health Care Provider.  Continue with Life vest Coumadin 1. Hold Coumadin for 1 day and restart on 7/25 with Coumadin 4 mg po   2. Please follow up with your primary care physician regarding your INR blood test for coumadin dosing. Please schedule an appointment with your primary care provider within one to two days  after your discharge.  3.  Coumadin  is used to thin the blood so clots will not form  or to keep existing ones from getting bigger.  Take this medication daily as prescribed by your health care provider.  Take with a full glass of water.  Do not run out of this drug.  If you miss a dose call your health care provider or pharmacist right away.  Tell your doctor you use this drug before you have a spinal or epidural procedure  Tell dentists, surgeon, and other doctors that you use this drug.  You may bleed more easily.  Be careful and avoid injury.  Use a soft toothbrush and an electric razor. Follow up with your cardiologist within 1 week  Continue Cardiac Medicine  Continue Life vest for the next 3 months 1. Continue Coumadin 5 mg po nightly   2. Please follow up with your primary care physician  or Cardiologist regarding your INR blood test for coumadin dosing. Please schedule an appointment with your primary care provider within one to two days  after your discharge.  3.  Coumadin  is used to thin the blood so clots will not form  or to keep existing ones from getting bigger.  Take this medication daily as prescribed by your health care provider.  Take with a full glass of water.  Do not run out of this drug.  If you miss a dose call your health care provider or pharmacist right away.  Tell your doctor you use this drug before you have a spinal or epidural procedure  Tell dentists, surgeon, and other doctors that you use this drug.  You may bleed more easily.  Be careful and avoid injury.  Use a soft toothbrush and an electric razor.

## 2017-07-24 NOTE — DISCHARGE NOTE ADULT - CARE PROVIDER_API CALL
Sheryl Salazar (DO), Cardiology Medicine  [address] [place] [id number]  Phone: [phone]  Fax: [phone]

## 2017-07-24 NOTE — DIETITIAN INITIAL EVALUATION ADULT. - NS AS NUTRI INTERV ED CONTENT
Pt denies need for low sodium diet education as she's aware of diagnosis and the importance of limiting sodium, has been educated in the past and has been actively changing her diet to promote limited sodium intake. Pt amenable to written heart-healthy materials for review at her leisure. Pt aware RD remains available.

## 2017-07-24 NOTE — DISCHARGE NOTE ADULT - HOSPITAL COURSE
To be completed by attending Pt with post partum CM adm with left shoulder pain and chest pain, pleuritic in nature.  Pain resolved spontaneously.  Echo shows EF 40-45%.  Will continue life vest for full 3 months per Dr Salazar.  INR in 3 days, suspect can DC coumadin in near future as was preventative for LV thrombus.

## 2017-07-24 NOTE — DISCHARGE NOTE ADULT - MEDICATION SUMMARY - MEDICATIONS TO TAKE
I will START or STAY ON the medications listed below when I get home from the hospital:    acetaminophen 325 mg oral tablet  -- 2 tab(s) by mouth every 6 hours, As needed, Mild Pain (1 - 3)  -- Indication: For pain medication as needed     enalapril 10 mg oral tablet  -- 1 tab(s) by mouth once a day  -- Indication: For Cardiomyopathy, peripartum/blood pressure     digoxin 125 mcg (0.125 mg) oral tablet  -- 1 tab(s) by mouth once a day  -- Indication: For Cardiomyopathy, peripartum    Coumadin 4 mg oral tablet  -- 1 tab(s) by mouth once a day  -- Indication: For blood thinner     carvedilol 6.25 mg oral tablet  -- 1 tab(s) by mouth 2 times a day  -- Indication: For Cardiomyopathy, peripartum    Lasix 40 mg oral tablet  -- 1 tab(s) by mouth once a day  -- Indication: For Cardiomyopathy, peripartum/diurectic     Prenatal Multivitamins with Folic Acid 1 mg oral capsule  -- 1 cap(s) by mouth once a day  -- Indication: For vitamin supplements I will START or STAY ON the medications listed below when I get home from the hospital:    acetaminophen 325 mg oral tablet  -- 2 tab(s) by mouth every 6 hours, As needed, Mild Pain (1 - 3)  -- Indication: For pain medication as needed     enalapril 10 mg oral tablet  -- 1 tab(s) by mouth once a day  -- Indication: For Cardiomyopathy, peripartum/blood pressure     digoxin 125 mcg (0.125 mg) oral tablet  -- 1 tab(s) by mouth once a day  -- Indication: For Cardiomyopathy, peripartum    Coumadin 5 mg oral tablet  -- 1 tab(s) by mouth once a day  -- Indication: For Cardiomyopathy, peripartum/Blood thinner     carvedilol 6.25 mg oral tablet  -- 1 tab(s) by mouth 2 times a day  -- Indication: For Cardiomyopathy, peripartum    Lasix 40 mg oral tablet  -- 1 tab(s) by mouth once a day  -- Indication: For Cardiomyopathy, peripartum/diurectic     Prenatal Multivitamins with Folic Acid 1 mg oral capsule  -- 1 cap(s) by mouth once a day  -- Indication: For vitamin supplements I will START or STAY ON the medications listed below when I get home from the hospital:    acetaminophen 325 mg oral tablet  -- 2 tab(s) by mouth every 6 hours, As needed, Mild Pain (1 - 3)  -- Indication: For pain medication as needed     enalapril 10 mg oral tablet  -- 1 tab(s) by mouth 2 times a day  -- Indication: For Cardiomyopathy, peripartum/elevated Blood pressure     digoxin 125 mcg (0.125 mg) oral tablet  -- 1 tab(s) by mouth once a day  -- Indication: For Cardiomyopathy, peripartum    Coumadin 5 mg oral tablet  -- 1 tab(s) by mouth once a day  -- Indication: For Cardiomyopathy, peripartum/Blood thinner     carvedilol 6.25 mg oral tablet  -- 1 tab(s) by mouth 2 times a day  -- Indication: For Cardiomyopathy, peripartum    Lasix 40 mg oral tablet  -- 1 tab(s) by mouth once a day  -- Indication: For Cardiomyopathy, peripartum/diurectic     Prenatal Multivitamins with Folic Acid 1 mg oral capsule  -- 1 cap(s) by mouth once a day  -- Indication: For vitamin supplements

## 2017-07-24 NOTE — DIETITIAN INITIAL EVALUATION ADULT. - ENERGY NEEDS
ht: 66 inches. wt: 222.2 pounds (current, standing, no edema). BMI: 35.9 kG/m2. UBW: 272 pounds just foll IBW: %IBW:  Other pertinent objective information: ht: 66 inches. wt: 222.2 pounds (current, standing, no edema). BMI: 35.9 kG/m2. UBW: 272 pounds immediately post-partum. IBW: 130 pounds +/- 10%. %IBW: 171%  Other pertinent objective information: 26 year old female pt with post partum cardiomyopathy, per cardiology wearing life vest until August then possible ICD. No pressure injuries.

## 2017-07-24 NOTE — DISCHARGE NOTE ADULT - OTHER SIGNIFICANT FINDINGS
Echo: EF 40-45%   Mild left ventricular enlargement.  2. Mild to moderate global left ventricular systolic  dysfunction. Visually estimated LVEF 40-45%.  3. The right ventricle is not well visualized; grossly  normal right ventricular systolic function.  4. No pericardial effusion seen.

## 2017-07-24 NOTE — DISCHARGE NOTE ADULT - MEDICATION SUMMARY - MEDICATIONS TO STOP TAKING
I will STOP taking the medications listed below when I get home from the hospital:    Coumadin 5 mg oral tablet  -- 1 tab(s) by mouth once a day I will STOP taking the medications listed below when I get home from the hospital:  None

## 2017-07-24 NOTE — DIETITIAN INITIAL EVALUATION ADULT. - ORAL INTAKE PTA
Pt reports good po intake PTA, 3 meals/day with snacks. Vitamin/mineral supplement PTA: Prenatal vitamin./good

## 2017-07-24 NOTE — PROGRESS NOTE ADULT - SUBJECTIVE AND OBJECTIVE BOX
- Patient seen and examined.  - In summary, patient is a 26y year old woman who presented with L shoulder pain x 1 week (2017 07:36)  - Today, patient is without complaints.         *****MEDICATIONS:    MEDICATIONS  (STANDING):  enalapril 10 milliGRAM(s) Oral two times a day  carvedilol 6.25 milliGRAM(s) Oral every 12 hours  prenatal multivitamin 1 Tablet(s) Oral daily  furosemide    Tablet 40 milliGRAM(s) Oral daily  digoxin     Tablet 0.125 milliGRAM(s) Oral daily    MEDICATIONS  (PRN):  acetaminophen   Tablet. 650 milliGRAM(s) Oral every 6 hours PRN Mild Pain (1 - 3)             ***** REVIEW OF SYSTEM:  GEN: no fever, no chills, no pain  RESP: no SOB, no cough, no sputum  CVS: no chest pain, no palpitations, no edema  GI: no abdominal pain, no nausea, no vomiting, no constipation, no diarrhea  : no dysurea, no frequency  NEURO: no headache, no diziness  PSYCH: no depression, not anxious  Derm : no itching, no rash         ***** VITAL SIGNS:  T(F): 98 (17 @ 05:00), Max: 98.2 (17 @ 12:27)  HR: 78 (17 @ 05:00) (66 - 78)  BP: 111/70 (17 @ 05:00) (97/65 - 111/70)  RR: 18 (17 @ 05:00) (18 - 18)  SpO2: 100% (17 @ 05:00) (98% - 100%)  Wt(kg): --  ,   I&O's Summary    2017 07:01  -  2017 07:00  --------------------------------------------------------  IN: 250 mL / OUT: 0 mL / NET: 250 mL             *****PHYSICAL EXAM:  GEN: A&O X 3 , NAD , comfortable  HEENT: NCAT, EOMI, MMM, no icterus  NECK: Supple, No JVD  CVS: S1S2 , regular , No M/R/G appreciated  PULM: CTA B/L,  no W/R/R appreciated  ABD.: soft. non tender, non distended,  bowel sounds present  Extrem: intact pulses , no edema noted  Derm: No rash or ecchymosis noted  PSYCH: normal mood, no depression, not anxious         *****LAB AND IMAGING:                        10.5   7.52  )-----------( 295      ( 2017 06:59 )             33.5               07-    138  |  104  |  9   ----------------------------<  96  3.9   |  23  |  0.70    Ca    9.5      2017 06:56    TPro  8.1  /  Alb  4.0  /  TBili  0.3  /  DBili  x   /  AST  19  /  ALT  18  /  AlkPhos  70  07-22    PT/INR - ( 2017 06:59 )   PT: 34.7 sec;   INR: 3.00 ratio         PTT - ( 2017 06:59 )  PTT:54.1 sec       CARDIAC MARKERS ( 2017 00:52 )  x     / <0.01 ng/mL / 113 U/L / x     / 1.0 ng/mL  CARDIAC MARKERS ( 2017 18:14 )  x     / <0.01 ng/mL / 127 U/L / x     / <1.0 ng/mL              Urinalysis Basic - ( 2017 07:52 )    Color: x / Appearance: Clear / S.007 / pH: x  Gluc: x / Ketone: Negative  / Bili: Negative / Urobili: Negative   Blood: x / Protein: Negative / Nitrite: Negative   Leuk Esterase: Negative / RBC: 0-2 /HPF / WBC 0-2 /HPF   Sq Epi: x / Non Sq Epi: OCC /HPF / Bacteria: Few /HPF      [All pertinent recent Imaging/Reports reviewed]    < from: Transthoracic Echocardiogram (17 @ 21:44) >  Conclusions:  ***Technically difficult study (foreshortened images).  1. Mild left ventricular enlargement.  2. Mild to moderate global left ventricular systolic  dysfunction. Visually estimated LVEF 40-45%.  3. The right ventricle is not well visualized; grossly  normal right ventricular systolic function.  4. No pericardial effusion seen.    < end of copied text >           *****A S S E S S M E N T   A N D   P L A N :    26F post partum cardiomyopathy, left shoulder/chest pain  pain is not likely anginal and has resolved  BNP normal  no signs of vol overload  d/w Dr Salazar, pt to finish full 3 months with life vest  BP stable  cont BB, ACE  cont coumadin  wearing life vest until August then possible ICD  echo as noted    __________________________  A. KEITH Hoyt.

## 2017-07-24 NOTE — DISCHARGE NOTE ADULT - CARE PLAN
Principal Discharge DX:	Chest pain, unspecified type  Goal:	resolved  Instructions for follow-up, activity and diet:	Follow up with your cardiologist within 1 week  Continue Cardiac Medicine  Secondary Diagnosis:	Cardiomyopathy, peripartum Principal Discharge DX:	Chest pain, unspecified type  Goal:	resolved  Instructions for follow-up, activity and diet:	Follow up with your cardiologist within 1 week  Continue Cardiac Medicine  Secondary Diagnosis:	Cardiomyopathy, peripartum  Instructions for follow-up, activity and diet:	Weigh yourself daily.  If you gain 3lbs in 3 days, or 5lbs in a week call your Health Care Provider.  Do not eat or drink foods containing more than 2000mg of salt (sodium) in your diet every day.  Call your Health Care Provider if you have any swelling or increased swelling in your feet, ankles, and/or stomach.  Take all of your medication as directed.  If you become dizzy call your Health Care Provider.  Continue with Life vest  Goal:	Coumadin  Instructions for follow-up, activity and diet:	1. Hold Coumadin for 1 day and restart on 7/25 with Coumadin 4 mg po   2. Please follow up with your primary care physician regarding your INR blood test for coumadin dosing. Please schedule an appointment with your primary care provider within one to two days  after your discharge.  3.  Coumadin  is used to thin the blood so clots will not form  or to keep existing ones from getting bigger.  Take this medication daily as prescribed by your health care provider.  Take with a full glass of water.  Do not run out of this drug.  If you miss a dose call your health care provider or pharmacist right away.  Tell your doctor you use this drug before you have a spinal or epidural procedure  Tell dentists, surgeon, and other doctors that you use this drug.  You may bleed more easily.  Be careful and avoid injury.  Use a soft toothbrush and an electric razor. Principal Discharge DX:	Chest pain, unspecified type  Goal:	resolved  Instructions for follow-up, activity and diet:	Follow up with your cardiologist within 1 week  Continue Cardiac Medicine  Continue Life vest for the next 3 months  Secondary Diagnosis:	Cardiomyopathy, peripartum  Instructions for follow-up, activity and diet:	Weigh yourself daily.  If you gain 3lbs in 3 days, or 5lbs in a week call your Health Care Provider.  Do not eat or drink foods containing more than 2000mg of salt (sodium) in your diet every day.  Call your Health Care Provider if you have any swelling or increased swelling in your feet, ankles, and/or stomach.  Take all of your medication as directed.  If you become dizzy call your Health Care Provider.  Continue with Life vest  Goal:	Coumadin  Instructions for follow-up, activity and diet:	1. Continue Coumadin 5 mg po nightly   2. Please follow up with your primary care physician  or Cardiologist regarding your INR blood test for coumadin dosing. Please schedule an appointment with your primary care provider within one to two days  after your discharge.  3.  Coumadin  is used to thin the blood so clots will not form  or to keep existing ones from getting bigger.  Take this medication daily as prescribed by your health care provider.  Take with a full glass of water.  Do not run out of this drug.  If you miss a dose call your health care provider or pharmacist right away.  Tell your doctor you use this drug before you have a spinal or epidural procedure  Tell dentists, surgeon, and other doctors that you use this drug.  You may bleed more easily.  Be careful and avoid injury.  Use a soft toothbrush and an electric razor.

## 2017-07-24 NOTE — DIETITIAN INITIAL EVALUATION ADULT. - ADHERENCE
good/Pt has been educated in the past regarding low-sodium diet for her current diagnosis, has eliminated fast foods and most restaurant foods and has been preparing her own meals at home to control her sodium intake better. Pt feels comfortable with nutrition label reading and denies further instruction.

## 2017-07-24 NOTE — DISCHARGE NOTE ADULT - ADDITIONAL INSTRUCTIONS
1. Please schedule an appointment with Dr. Salazar within 1-2 days for PT/INR check   2. Continue your Life Vest for the next 3 months

## 2017-08-25 ENCOUNTER — APPOINTMENT (OUTPATIENT)
Dept: CARDIOLOGY | Facility: CLINIC | Age: 27
End: 2017-08-25
Payer: MEDICAID

## 2017-08-25 ENCOUNTER — APPOINTMENT (OUTPATIENT)
Dept: CV DIAGNOSITCS | Facility: HOSPITAL | Age: 27
End: 2017-08-25
Payer: MEDICAID

## 2017-08-25 ENCOUNTER — OUTPATIENT (OUTPATIENT)
Dept: OUTPATIENT SERVICES | Facility: HOSPITAL | Age: 27
LOS: 1 days | End: 2017-08-25

## 2017-08-25 ENCOUNTER — NON-APPOINTMENT (OUTPATIENT)
Age: 27
End: 2017-08-25

## 2017-08-25 VITALS
SYSTOLIC BLOOD PRESSURE: 117 MMHG | OXYGEN SATURATION: 99 % | WEIGHT: 209 LBS | DIASTOLIC BLOOD PRESSURE: 78 MMHG | HEIGHT: 66 IN | HEART RATE: 90 BPM | BODY MASS INDEX: 33.59 KG/M2

## 2017-08-25 DIAGNOSIS — O34.219 MATERNAL CARE FOR UNSPECIFIED TYPE SCAR FROM PREVIOUS CESAREAN DELIVERY: Chronic | ICD-10-CM

## 2017-08-25 DIAGNOSIS — I50.22 CHRONIC SYSTOLIC (CONGESTIVE) HEART FAILURE: ICD-10-CM

## 2017-08-25 PROCEDURE — 93306 TTE W/DOPPLER COMPLETE: CPT | Mod: 26

## 2017-08-25 PROCEDURE — 99214 OFFICE O/P EST MOD 30 MIN: CPT

## 2017-08-25 PROCEDURE — 93000 ELECTROCARDIOGRAM COMPLETE: CPT

## 2017-08-25 RX ORDER — CARVEDILOL 3.12 MG/1
3.12 TABLET, FILM COATED ORAL TWICE DAILY
Qty: 60 | Refills: 2 | Status: DISCONTINUED | COMMUNITY
End: 2017-08-25

## 2017-08-25 RX ORDER — WARFARIN 5 MG/1
5 TABLET ORAL DAILY
Qty: 90 | Refills: 3 | Status: DISCONTINUED | COMMUNITY
End: 2017-08-25

## 2017-08-25 RX ORDER — DIGOXIN 0.12 MG/1
125 TABLET ORAL DAILY
Qty: 30 | Refills: 2 | Status: DISCONTINUED | COMMUNITY
End: 2017-08-25

## 2017-08-27 ENCOUNTER — TRANSCRIPTION ENCOUNTER (OUTPATIENT)
Age: 27
End: 2017-08-27

## 2017-09-05 ENCOUNTER — RX RENEWAL (OUTPATIENT)
Age: 27
End: 2017-09-05

## 2017-09-05 ENCOUNTER — APPOINTMENT (OUTPATIENT)
Dept: ELECTROPHYSIOLOGY | Facility: CLINIC | Age: 27
End: 2017-09-05

## 2017-09-10 ENCOUNTER — TRANSCRIPTION ENCOUNTER (OUTPATIENT)
Age: 27
End: 2017-09-10

## 2017-09-19 ENCOUNTER — TRANSCRIPTION ENCOUNTER (OUTPATIENT)
Age: 27
End: 2017-09-19

## 2017-09-24 ENCOUNTER — RX RENEWAL (OUTPATIENT)
Age: 27
End: 2017-09-24

## 2017-09-27 ENCOUNTER — RX RENEWAL (OUTPATIENT)
Age: 27
End: 2017-09-27

## 2017-10-04 ENCOUNTER — MEDICATION RENEWAL (OUTPATIENT)
Age: 27
End: 2017-10-04

## 2017-10-16 NOTE — DIETITIAN INITIAL EVALUATION ADULT. - ETIOLOGY
“Patient's name, , procedure and correct site were confirmed during the [place] Timeout.” Related to excessive food intake

## 2017-11-28 ENCOUNTER — OUTPATIENT (OUTPATIENT)
Dept: OUTPATIENT SERVICES | Facility: HOSPITAL | Age: 27
LOS: 1 days | End: 2017-11-28

## 2017-11-28 ENCOUNTER — APPOINTMENT (OUTPATIENT)
Dept: CV DIAGNOSITCS | Facility: HOSPITAL | Age: 27
End: 2017-11-28
Payer: MEDICAID

## 2017-11-28 ENCOUNTER — APPOINTMENT (OUTPATIENT)
Dept: CARDIOLOGY | Facility: CLINIC | Age: 27
End: 2017-11-28
Payer: MEDICAID

## 2017-11-28 VITALS
HEART RATE: 74 BPM | HEIGHT: 66 IN | WEIGHT: 204 LBS | SYSTOLIC BLOOD PRESSURE: 133 MMHG | BODY MASS INDEX: 32.78 KG/M2 | DIASTOLIC BLOOD PRESSURE: 84 MMHG | OXYGEN SATURATION: 100 % | RESPIRATION RATE: 16 BRPM

## 2017-11-28 VITALS
BODY MASS INDEX: 32.93 KG/M2 | OXYGEN SATURATION: 100 % | WEIGHT: 204 LBS | RESPIRATION RATE: 16 BRPM | DIASTOLIC BLOOD PRESSURE: 66 MMHG | SYSTOLIC BLOOD PRESSURE: 106 MMHG | HEART RATE: 83 BPM

## 2017-11-28 DIAGNOSIS — O34.219 MATERNAL CARE FOR UNSPECIFIED TYPE SCAR FROM PREVIOUS CESAREAN DELIVERY: Chronic | ICD-10-CM

## 2017-11-28 DIAGNOSIS — I10 ESSENTIAL (PRIMARY) HYPERTENSION: ICD-10-CM

## 2017-11-28 PROCEDURE — 93306 TTE W/DOPPLER COMPLETE: CPT | Mod: 26

## 2017-11-28 PROCEDURE — 93000 ELECTROCARDIOGRAM COMPLETE: CPT

## 2017-11-28 PROCEDURE — 99214 OFFICE O/P EST MOD 30 MIN: CPT

## 2018-01-02 ENCOUNTER — RX RENEWAL (OUTPATIENT)
Age: 28
End: 2018-01-02

## 2018-02-28 ENCOUNTER — OUTPATIENT (OUTPATIENT)
Dept: OUTPATIENT SERVICES | Facility: HOSPITAL | Age: 28
LOS: 1 days | End: 2018-02-28

## 2018-02-28 ENCOUNTER — APPOINTMENT (OUTPATIENT)
Dept: CV DIAGNOSITCS | Facility: HOSPITAL | Age: 28
End: 2018-02-28
Payer: MEDICAID

## 2018-02-28 DIAGNOSIS — I50.22 CHRONIC SYSTOLIC (CONGESTIVE) HEART FAILURE: ICD-10-CM

## 2018-02-28 DIAGNOSIS — I42.9 CARDIOMYOPATHY, UNSPECIFIED: ICD-10-CM

## 2018-02-28 DIAGNOSIS — O34.219 MATERNAL CARE FOR UNSPECIFIED TYPE SCAR FROM PREVIOUS CESAREAN DELIVERY: Chronic | ICD-10-CM

## 2018-02-28 LAB — HCG UR QL: NEGATIVE — SIGNIFICANT CHANGE UP

## 2018-02-28 PROCEDURE — 93325 DOPPLER ECHO COLOR FLOW MAPG: CPT | Mod: 26,GC

## 2018-02-28 PROCEDURE — 93320 DOPPLER ECHO COMPLETE: CPT | Mod: 26,GC

## 2018-02-28 PROCEDURE — 93350 STRESS TTE ONLY: CPT | Mod: 26

## 2018-03-05 ENCOUNTER — APPOINTMENT (OUTPATIENT)
Dept: CARDIOLOGY | Facility: CLINIC | Age: 28
End: 2018-03-05
Payer: MEDICAID

## 2018-03-05 VITALS
HEART RATE: 71 BPM | DIASTOLIC BLOOD PRESSURE: 82 MMHG | BODY MASS INDEX: 33.43 KG/M2 | HEIGHT: 66 IN | RESPIRATION RATE: 16 BRPM | OXYGEN SATURATION: 100 % | WEIGHT: 208 LBS | SYSTOLIC BLOOD PRESSURE: 132 MMHG

## 2018-03-05 PROCEDURE — 99214 OFFICE O/P EST MOD 30 MIN: CPT

## 2018-03-05 PROCEDURE — 93000 ELECTROCARDIOGRAM COMPLETE: CPT

## 2018-04-16 ENCOUNTER — RX RENEWAL (OUTPATIENT)
Age: 28
End: 2018-04-16

## 2018-04-17 ENCOUNTER — RX RENEWAL (OUTPATIENT)
Age: 28
End: 2018-04-17

## 2018-06-25 ENCOUNTER — APPOINTMENT (OUTPATIENT)
Dept: CARDIOLOGY | Facility: CLINIC | Age: 28
End: 2018-06-25

## 2018-07-03 ENCOUNTER — APPOINTMENT (OUTPATIENT)
Dept: CARDIOLOGY | Facility: CLINIC | Age: 28
End: 2018-07-03
Payer: MEDICAID

## 2018-07-03 ENCOUNTER — NON-APPOINTMENT (OUTPATIENT)
Age: 28
End: 2018-07-03

## 2018-07-03 VITALS
SYSTOLIC BLOOD PRESSURE: 128 MMHG | BODY MASS INDEX: 34.07 KG/M2 | WEIGHT: 212 LBS | HEIGHT: 66 IN | OXYGEN SATURATION: 100 % | HEART RATE: 85 BPM | DIASTOLIC BLOOD PRESSURE: 85 MMHG

## 2018-07-03 PROCEDURE — 93000 ELECTROCARDIOGRAM COMPLETE: CPT

## 2018-07-03 PROCEDURE — 99213 OFFICE O/P EST LOW 20 MIN: CPT

## 2018-07-19 ENCOUNTER — RX RENEWAL (OUTPATIENT)
Age: 28
End: 2018-07-19

## 2018-07-31 ENCOUNTER — RX RENEWAL (OUTPATIENT)
Age: 28
End: 2018-07-31

## 2018-09-02 NOTE — CHART NOTE - NSCHARTNOTEFT_GEN_A_CORE
Patient is 26y  old.    Event :    Vital Signs Last 24 Hrs  T(C): 36.9, Max: 37.2 (05-18 @ 17:30)  T(F): 98.4, Max: 98.9 (05-18 @ 17:30)  HR: 113 (97 - 119)  BP: 130/75 (91/57 - 166/106)  BP(mean): 90 (60 - 123)  RR: 24 (19 - 37)  SpO2: 100% (95% - 100%)    I&O's Detail  I & Os for 24h ending 19 May 2017 07:00  =============================================  IN:    Oral Fluid: 75 ml    nitroglycerin  Infusion: 19 ml    Total IN: 94 ml  ---------------------------------------------  OUT:    Voided: 2500 ml    Total OUT: 2500 ml  ---------------------------------------------  Total NET: -2406 ml    I & Os for current day (as of 19 May 2017 16:13)  =============================================  IN:    Oral Fluid: 420 ml    Total IN: 420 ml  ---------------------------------------------  OUT:    Voided: 1300 ml    Total OUT: 1300 ml  ---------------------------------------------  Total NET: -880 ml      Labs:                        11.1   8.10  )-----------( 376      ( 19 May 2017 06:00 )             35.9                         10.9   8.57  )-----------( 387      ( 18 May 2017 14:00 )             36.3         PT/INR - ( 19 May 2017 06:00 )   PT: 12.6 SEC;   INR: 1.12          PTT - ( 19 May 2017 06:00 )  PTT:34.3 SEC    Fibrinogen:     Lactate:     MEDICATIONS  (STANDING):  heparin  Injectable 5000Unit(s) SubCutaneous every 8 hours  lisinopril 2.5milliGRAM(s) Oral daily  furosemide    Tablet 40milliGRAM(s) Oral two times a day  potassium chloride   Powder 20milliEquivalent(s) Oral daily      Evaluation :  Patient sitting up in bed and appears comfortable w/o distress. She reports feeling short of breath at rest but this has improved since diuresis.  Differential Dg : Peripartum cardiomyopathy, preexisting heart disease, diastolic dysfunction    Management and Follow-up plan :  Plan by CCU includes cardiac regimen to improve heart function. Stop keppra as this is unlikely preeclampsia. Patient should meet with MFM prior to considering pregnancy. Patient should be placed on birth control regimen until cardiac function improves. This can be decided as outpatient.            ------------------------------------------------------------------------------------------------------------- 0

## 2018-10-26 ENCOUNTER — RX RENEWAL (OUTPATIENT)
Age: 28
End: 2018-10-26

## 2019-01-23 ENCOUNTER — RX RENEWAL (OUTPATIENT)
Age: 29
End: 2019-01-23

## 2019-02-01 ENCOUNTER — NON-APPOINTMENT (OUTPATIENT)
Age: 29
End: 2019-02-01

## 2019-02-01 ENCOUNTER — APPOINTMENT (OUTPATIENT)
Dept: CARDIOLOGY | Facility: CLINIC | Age: 29
End: 2019-02-01
Payer: MEDICAID

## 2019-02-01 VITALS
OXYGEN SATURATION: 100 % | HEART RATE: 75 BPM | DIASTOLIC BLOOD PRESSURE: 75 MMHG | WEIGHT: 210 LBS | SYSTOLIC BLOOD PRESSURE: 122 MMHG | HEIGHT: 66 IN | RESPIRATION RATE: 16 BRPM | BODY MASS INDEX: 33.75 KG/M2

## 2019-02-01 PROBLEM — O13.9 GESTATIONAL [PREGNANCY-INDUCED] HYPERTENSION WITHOUT SIGNIFICANT PROTEINURIA, UNSPECIFIED TRIMESTER: Chronic | Status: ACTIVE | Noted: 2017-05-18

## 2019-02-01 PROBLEM — O11.9 PRE-EXISTING HYPERTENSION WITH PRE-ECLAMPSIA, UNSPECIFIED TRIMESTER: Chronic | Status: ACTIVE | Noted: 2017-05-18

## 2019-02-01 PROCEDURE — 93000 ELECTROCARDIOGRAM COMPLETE: CPT

## 2019-02-01 PROCEDURE — 99213 OFFICE O/P EST LOW 20 MIN: CPT

## 2019-02-01 NOTE — PROGRESS NOTE ADULT - SUBJECTIVE AND OBJECTIVE BOX
12 hour chart check.     Patient is a 26y old  Female who presents with a chief complaint of chest pain and shortness of breath (19 May 2017 11:00)      OVERNIGHT EVENTS: Tele SR HR 70-90  Pt denies chest pain, SOB, palp, N.V.D. fever or chills    MEDICATIONS  (STANDING):  chlorhexidine 4% Liquid 1Application(s) Topical daily  multivitamin 1Tablet(s) Oral daily  folic acid 1milliGRAM(s) Oral daily  senna 2Tablet(s) Oral at bedtime  docusate sodium 100milliGRAM(s) Oral three times a day  digoxin     Tablet 0.125milliGRAM(s) Oral daily  furosemide    Tablet 40milliGRAM(s) Oral daily  carvedilol 9.375milliGRAM(s) Oral every 12 hours  heparin  Infusion 1200Unit(s)/Hr IV Continuous <Continuous>  enalapril 7.5milliGRAM(s) Oral every 12 hours    MEDICATIONS  (PRN):      Allergies    No Known Allergies    Intolerances        REVIEW OF SYSTEMS:  CONSTITUTIONAL: No fever, No chills,No fatigue,No myalgia,No Body ache  EYES: No eye pain, visual disturbances, or discharge  ENMT:  No ear pain, No nose bleed, No vertigo; No sinus or throat pain  NECK: No pain, No stiffness  RESPIRATORY: No cough, wheezing, No  hemoptysis; No shortness of breath  CARDIOVASCULAR: No chest pain, palpitations, leg swelling  GASTROINTESTINAL: No abdominal or epigastric pain. No nausea, No vomiting; No diarrhea or constipation. [ ] BM  GENITOURINARY: No dysuria, No frequency, No urgency, No hematuria, or incontinence  NEUROLOGICAL: alert and oriented x 3,  No headaches, No dizziness, No numbness,  SKIN:   No itching, burning, rashes, or lesions   MUSCULOSKELETAL: No joint pain or swelling; No muscle pain, No back pain, No extremity pain  PSYCHIATRIC: No depression, anxiety, mood swings, or difficulty sleeping  ROS Unable to obtain due to - [ ] Dementia  [ ] Lethargy  REST OF REVIEW Of SYSTEM - [X] Normal     Height (cm): 167.6 (05-18 @ 17:30)  Weight (kg): 110 (05-18 @ 19:00)  BMI (kg/m2): 39.2 (05-18 @ 19:00)  BSA (m2): 2.17 (05-18 @ 19:00)  Vital Signs Last 24 Hrs  T(C): 36.9, Max: 37.1 (05-24 @ 16:10)  T(F): 98.5, Max: 98.7 (05-24 @ 16:10)  HR: 76 (70 - 106)  BP: 105/38 (89/44 - 139/62)  BP(mean): 56 (48 - 366)  RR: 18 (17 - 23)  SpO2: 98% (96% - 100%)  [ ] room air   [X] 02    I & Os for current day (as of 05-25 @ 07:49)  =============================================  IN: 797 ml / OUT: 1000 ml / NET: -203 ml      PHYSICAL EXAM:  GENERAL:  No acute distresss, well appearing, [ ] Agitated, [ ] Lethargy, [ ] confused   HEAD:  normal  ENMT: normal  NECK:  normal    NERVOUS SYSTEM:  Alert & Oriented X3, no focal deficits [ ]Confusion  [ ] Encephalopathic [ ] Sedated [ ] Other  CHEST/LUNG: Clear to auscultation bilaterally,  [ ] decreased breath sounds at bases  [ ] wheezing   [ ] rhonchi  [ ] crackles  HEART:  Regular rate and rhythm, No murmurs, rubs, or gallops,  [ ] irregular   ABDOMEN:  soft, nontender, nondistended, positive bowel sounds   [ ] obese  EXTREMITIES: No clubbing, cyanosis or edema  SKIN: [ ] venous stasis skin changes    LABS:                        11.5   7.49  )-----------( 305      ( 25 May 2017 06:00 )             37.8       Ca    9.8        24 May 2017 06:30      PT/INR - ( 25 May 2017 06:00 )   PT: 12.3 SEC;   INR: 1.10          PTT - ( 25 May 2017 06:00 )  PTT:73.5 SEC  Hemoglobin A1C, Whole Blood: 5.9 % (05-19 @ 06:10)      CAPILLARY BLOOD GLUCOSE    Cultures          Cardiology Images:    1. Normal mitral valve. Minimal mitral regurgitation.  2. Mild left ventricular enlargement.  3. Severe global left ventricular systolic dysfunction.  4. The right ventricle is not well visualized; grossly  normal right ventricular systolic function.  5. Estimated right ventricular systolic pressure equals 43  mm Hg, assuming right atrial pressure equals 10 mm Hg,  consistent with mild pulmonary hypertension.  *** Compared with echocardiogram of 5/18/2017, less mitral  regurgitation is noted on the current study.  In addition,  the estimated pulmonary artery systolic pressure has  decreased.    Care Discussed with [] Consultants  [x] Patient  [X] Family  []   /   [ ]RN  DVT prophylaxis [ ] lovenox   [ ] subq heparin  [ ] coumadin  [ ] venodynes [ ] ambulating frequently at how risk for vte and no pharm (x)  heparin gtt        or  mechanical prophylaxis required    [ ] other   Advanced directive:    [ ]pt has hcp     [ ] pt declined to assign hcp  Discussed with pt @ bedside

## 2019-02-01 NOTE — HISTORY OF PRESENT ILLNESS
[FreeTextEntry1] : Here for followup. Feeling well without complaints. No chest pain, dyspnea or edema\par Taking coreg and enalapril.

## 2019-02-01 NOTE — PHYSICAL EXAM
[General Appearance - Well Developed] : well developed [Normal Appearance] : normal appearance [Well Groomed] : well groomed [General Appearance - Well Nourished] : well nourished [No Deformities] : no deformities [General Appearance - In No Acute Distress] : no acute distress [Normal Conjunctiva] : the conjunctiva exhibited no abnormalities [Eyelids - No Xanthelasma] : the eyelids demonstrated no xanthelasmas [Normal Oral Mucosa] : normal oral mucosa [No Oral Pallor] : no oral pallor [No Oral Cyanosis] : no oral cyanosis [Normal Jugular Venous A Waves Present] : normal jugular venous A waves present [Normal Jugular Venous V Waves Present] : normal jugular venous V waves present [No Jugular Venous Pickering A Waves] : no jugular venous pickering A waves [Respiration, Rhythm And Depth] : normal respiratory rhythm and effort [Exaggerated Use Of Accessory Muscles For Inspiration] : no accessory muscle use [Auscultation Breath Sounds / Voice Sounds] : lungs were clear to auscultation bilaterally [Heart Rate And Rhythm] : heart rate and rhythm were normal [Heart Sounds] : normal S1 and S2 [Murmurs] : no murmurs present [Abdomen Soft] : soft [Abdomen Tenderness] : non-tender [Abdomen Mass (___ Cm)] : no abdominal mass palpated [Nail Clubbing] : no clubbing of the fingernails [Cyanosis, Localized] : no localized cyanosis [Petechial Hemorrhages (___cm)] : no petechial hemorrhages [Skin Color & Pigmentation] : normal skin color and pigmentation [] : no rash [No Venous Stasis] : no venous stasis [Skin Lesions] : no skin lesions [No Skin Ulcers] : no skin ulcer [No Xanthoma] : no  xanthoma was observed [Oriented To Time, Place, And Person] : oriented to person, place, and time [Affect] : the affect was normal [Mood] : the mood was normal [No Anxiety] : not feeling anxious

## 2019-02-01 NOTE — REASON FOR VISIT
[Follow-Up - Clinic] : a clinic follow-up of [FreeTextEntry2] : history of PPCM preeclampsia and HTN

## 2019-02-01 NOTE — DISCUSSION/SUMMARY
[FreeTextEntry1] : 28 year old woman with history of PPCM (resolved) HTN here for followup. Doing well. no complaints\par -can dc coreg and cont with enalapril\par -fu in 3 months\par -check TTE

## 2019-04-30 NOTE — PROVIDER CONTACT NOTE (OTHER) - SITUATION
Patient low blood pressure
Patient complaining of feeling heaviness on her chest.
Pt's BP at 20:07 is 158/91. Labetalol 200 mg given as ordered. BP recheck at 22:19 is 146/68.
no

## 2019-05-03 ENCOUNTER — APPOINTMENT (OUTPATIENT)
Dept: CV DIAGNOSITCS | Facility: HOSPITAL | Age: 29
End: 2019-05-03
Payer: COMMERCIAL

## 2019-05-03 ENCOUNTER — NON-APPOINTMENT (OUTPATIENT)
Age: 29
End: 2019-05-03

## 2019-05-03 ENCOUNTER — APPOINTMENT (OUTPATIENT)
Dept: CARDIOLOGY | Facility: CLINIC | Age: 29
End: 2019-05-03
Payer: COMMERCIAL

## 2019-05-03 ENCOUNTER — OUTPATIENT (OUTPATIENT)
Dept: OUTPATIENT SERVICES | Facility: HOSPITAL | Age: 29
LOS: 1 days | End: 2019-05-03

## 2019-05-03 VITALS
DIASTOLIC BLOOD PRESSURE: 69 MMHG | OXYGEN SATURATION: 100 % | HEART RATE: 93 BPM | RESPIRATION RATE: 16 BRPM | BODY MASS INDEX: 33.75 KG/M2 | SYSTOLIC BLOOD PRESSURE: 115 MMHG | WEIGHT: 210 LBS | HEIGHT: 66 IN

## 2019-05-03 DIAGNOSIS — I10 ESSENTIAL (PRIMARY) HYPERTENSION: ICD-10-CM

## 2019-05-03 DIAGNOSIS — I42.9 CARDIOMYOPATHY, UNSPECIFIED: ICD-10-CM

## 2019-05-03 DIAGNOSIS — O34.219 MATERNAL CARE FOR UNSPECIFIED TYPE SCAR FROM PREVIOUS CESAREAN DELIVERY: Chronic | ICD-10-CM

## 2019-05-03 PROCEDURE — 93306 TTE W/DOPPLER COMPLETE: CPT | Mod: 26

## 2019-05-03 PROCEDURE — 99213 OFFICE O/P EST LOW 20 MIN: CPT

## 2019-05-03 PROCEDURE — 93000 ELECTROCARDIOGRAM COMPLETE: CPT

## 2019-05-03 RX ORDER — CARVEDILOL 6.25 MG/1
6.25 TABLET, FILM COATED ORAL
Qty: 180 | Refills: 2 | Status: DISCONTINUED | COMMUNITY
Start: 2017-08-02 | End: 2019-05-03

## 2019-05-03 NOTE — PHYSICAL EXAM
[General Appearance - Well Developed] : well developed [Normal Appearance] : normal appearance [Well Groomed] : well groomed [General Appearance - Well Nourished] : well nourished [General Appearance - In No Acute Distress] : no acute distress [No Deformities] : no deformities [Eyelids - No Xanthelasma] : the eyelids demonstrated no xanthelasmas [Normal Conjunctiva] : the conjunctiva exhibited no abnormalities [Normal Oral Mucosa] : normal oral mucosa [No Oral Pallor] : no oral pallor [No Oral Cyanosis] : no oral cyanosis [Normal Jugular Venous A Waves Present] : normal jugular venous A waves present [Normal Jugular Venous V Waves Present] : normal jugular venous V waves present [No Jugular Venous Pickering A Waves] : no jugular venous pickering A waves [Exaggerated Use Of Accessory Muscles For Inspiration] : no accessory muscle use [Respiration, Rhythm And Depth] : normal respiratory rhythm and effort [Auscultation Breath Sounds / Voice Sounds] : lungs were clear to auscultation bilaterally [Heart Rate And Rhythm] : heart rate and rhythm were normal [Heart Sounds] : normal S1 and S2 [Murmurs] : no murmurs present [Abdomen Tenderness] : non-tender [Abdomen Soft] : soft [Nail Clubbing] : no clubbing of the fingernails [Abdomen Mass (___ Cm)] : no abdominal mass palpated [Cyanosis, Localized] : no localized cyanosis [Petechial Hemorrhages (___cm)] : no petechial hemorrhages [Skin Color & Pigmentation] : normal skin color and pigmentation [No Venous Stasis] : no venous stasis [] : no rash [Skin Lesions] : no skin lesions [No Skin Ulcers] : no skin ulcer [No Xanthoma] : no  xanthoma was observed [Affect] : the affect was normal [Oriented To Time, Place, And Person] : oriented to person, place, and time [Mood] : the mood was normal [No Anxiety] : not feeling anxious

## 2019-05-03 NOTE — DISCUSSION/SUMMARY
[FreeTextEntry1] : 28 year old woman with history of PPCM and preeclampsia here for followup\par TTE normal\par Cut enalapril to once daily \par If BP ok 1 month can dc enalapril\par Fu in 2 months

## 2019-05-03 NOTE — HISTORY OF PRESENT ILLNESS
[FreeTextEntry1] : Here for followup.\par Doing well. No complaints\par Off Coreg and now on enalapril\par TTE images reviewed and normal Function

## 2019-05-15 ENCOUNTER — RX RENEWAL (OUTPATIENT)
Age: 29
End: 2019-05-15

## 2019-07-22 ENCOUNTER — NON-APPOINTMENT (OUTPATIENT)
Age: 29
End: 2019-07-22

## 2019-07-22 ENCOUNTER — APPOINTMENT (OUTPATIENT)
Dept: CARDIOLOGY | Facility: CLINIC | Age: 29
End: 2019-07-22
Payer: COMMERCIAL

## 2019-07-22 VITALS
BODY MASS INDEX: 37.61 KG/M2 | DIASTOLIC BLOOD PRESSURE: 80 MMHG | SYSTOLIC BLOOD PRESSURE: 123 MMHG | OXYGEN SATURATION: 100 % | WEIGHT: 234 LBS | HEIGHT: 66 IN | HEART RATE: 74 BPM

## 2019-07-22 PROCEDURE — 99214 OFFICE O/P EST MOD 30 MIN: CPT

## 2019-07-22 PROCEDURE — 93000 ELECTROCARDIOGRAM COMPLETE: CPT

## 2019-07-22 NOTE — DISCUSSION/SUMMARY
[FreeTextEntry1] : 28 year old woman with history of peripartum CM and preeclampsia with original EF of 24% and marked improvement of LV function within 2 months. Off meds\par Continue to monitor\par Continue off meds\par FU in 6 months

## 2019-07-22 NOTE — HISTORY OF PRESENT ILLNESS
[FreeTextEntry1] : Here for followup\par No new complaints\par Off enalapril for 2 months\par BP stable\par

## 2019-07-22 NOTE — PHYSICAL EXAM
[General Appearance - Well Developed] : well developed [Normal Appearance] : normal appearance [Well Groomed] : well groomed [General Appearance - Well Nourished] : well nourished [No Deformities] : no deformities [General Appearance - In No Acute Distress] : no acute distress [Normal Conjunctiva] : the conjunctiva exhibited no abnormalities [Eyelids - No Xanthelasma] : the eyelids demonstrated no xanthelasmas [Normal Oral Mucosa] : normal oral mucosa [No Oral Cyanosis] : no oral cyanosis [No Oral Pallor] : no oral pallor [Normal Jugular Venous A Waves Present] : normal jugular venous A waves present [Normal Jugular Venous V Waves Present] : normal jugular venous V waves present [No Jugular Venous Pickering A Waves] : no jugular venous pickering A waves [Respiration, Rhythm And Depth] : normal respiratory rhythm and effort [Exaggerated Use Of Accessory Muscles For Inspiration] : no accessory muscle use [Auscultation Breath Sounds / Voice Sounds] : lungs were clear to auscultation bilaterally [Heart Rate And Rhythm] : heart rate and rhythm were normal [Heart Sounds] : normal S1 and S2 [Murmurs] : no murmurs present [Abdomen Tenderness] : non-tender [Abdomen Soft] : soft [Abdomen Mass (___ Cm)] : no abdominal mass palpated [Nail Clubbing] : no clubbing of the fingernails [Cyanosis, Localized] : no localized cyanosis [Petechial Hemorrhages (___cm)] : no petechial hemorrhages [Skin Color & Pigmentation] : normal skin color and pigmentation [] : no rash [No Venous Stasis] : no venous stasis [Skin Lesions] : no skin lesions [No Skin Ulcers] : no skin ulcer [No Xanthoma] : no  xanthoma was observed [Oriented To Time, Place, And Person] : oriented to person, place, and time [Affect] : the affect was normal [Mood] : the mood was normal [No Anxiety] : not feeling anxious

## 2019-08-13 ENCOUNTER — RX RENEWAL (OUTPATIENT)
Age: 29
End: 2019-08-13

## 2019-08-22 NOTE — ED ADULT NURSE NOTE - PMH
Pre-eclampsia complicating hypertension    Pregnancy induced hypertension, antepartum
2 person assist/verbal cues/nonverbal cues (demo/gestures)

## 2019-09-17 NOTE — PACU DISCHARGE NOTE - NSPTMEETSDISCHCRITERIADT_GEN_A_CORE
Addended by: ORLANDO VINCENT on: 9/17/2019 12:55 PM     Modules accepted: Orders    
Addended by: PHILIP QUINTANA on: 9/17/2019 11:29 AM     Modules accepted: Stef    
11-Apr-2017 08:30

## 2019-10-04 NOTE — DISCHARGE NOTE ADULT - NS AS DC VTE INSTRUCTION
10/3/2019  Lashell Meier DO, saw and evaluated the patient  I have discussed the patient with the resident/non-physician practitioner and agree with the resident's/non-physician practitioner's findings, Plan of Care, and MDM as documented in the resident's/non-physician practitioner's note, except where noted  All available labs and Radiology studies were reviewed  I was present for key portions of any procedure(s) performed by the resident/non-physician practitioner and I was immediately available to provide assistance  At this point I agree with the current assessment done in the Emergency Department  I have conducted an independent evaluation of this patient a history and physical is as follows:      37 yo female c/o acute onset epigastric pain radiating around to her back  Pain 8/10, constant, waxes and wanes  Associated with watery brown diarrhea  Denies n/v, urinary sxs, f/c, vag dc or bleeding  States she is not pregnant because she has an IUD     abd snd minimal epigastric TTP no r/g bs+    Imp: epigastric abd pain, likely viral/NOS plan: tx sx, abd labs, upreg  Reassess        ED Course         Critical Care Time  Procedures
Coumadin/Warfarin - Compliance.../Coumadin/Warfarin - Dietary Advice.../Coumadin/Warfarin - Potential for adverse drug reactions and interactions/Coumadin/Warfarin - Follow-up monitoring...

## 2019-12-30 ENCOUNTER — APPOINTMENT (OUTPATIENT)
Dept: CV DIAGNOSITCS | Facility: HOSPITAL | Age: 29
End: 2019-12-30

## 2020-02-07 ENCOUNTER — APPOINTMENT (OUTPATIENT)
Dept: CARDIOLOGY | Facility: CLINIC | Age: 30
End: 2020-02-07
Payer: COMMERCIAL

## 2020-02-07 ENCOUNTER — NON-APPOINTMENT (OUTPATIENT)
Age: 30
End: 2020-02-07

## 2020-02-07 ENCOUNTER — TRANSCRIPTION ENCOUNTER (OUTPATIENT)
Age: 30
End: 2020-02-07

## 2020-02-07 ENCOUNTER — APPOINTMENT (OUTPATIENT)
Dept: CV DIAGNOSITCS | Facility: HOSPITAL | Age: 30
End: 2020-02-07
Payer: COMMERCIAL

## 2020-02-07 ENCOUNTER — OUTPATIENT (OUTPATIENT)
Dept: OUTPATIENT SERVICES | Facility: HOSPITAL | Age: 30
LOS: 1 days | End: 2020-02-07

## 2020-02-07 VITALS
SYSTOLIC BLOOD PRESSURE: 139 MMHG | OXYGEN SATURATION: 99 % | BODY MASS INDEX: 38.89 KG/M2 | DIASTOLIC BLOOD PRESSURE: 79 MMHG | WEIGHT: 242 LBS | HEART RATE: 86 BPM | HEIGHT: 66 IN

## 2020-02-07 DIAGNOSIS — O34.219 MATERNAL CARE FOR UNSPECIFIED TYPE SCAR FROM PREVIOUS CESAREAN DELIVERY: Chronic | ICD-10-CM

## 2020-02-07 DIAGNOSIS — I42.9 CARDIOMYOPATHY, UNSPECIFIED: ICD-10-CM

## 2020-02-07 DIAGNOSIS — I50.22 CHRONIC SYSTOLIC (CONGESTIVE) HEART FAILURE: ICD-10-CM

## 2020-02-07 PROCEDURE — 99213 OFFICE O/P EST LOW 20 MIN: CPT

## 2020-02-07 PROCEDURE — 93000 ELECTROCARDIOGRAM COMPLETE: CPT

## 2020-02-07 PROCEDURE — 93306 TTE W/DOPPLER COMPLETE: CPT | Mod: 26

## 2020-02-07 RX ORDER — FUROSEMIDE 20 MG/1
20 TABLET ORAL
Qty: 30 | Refills: 5 | Status: DISCONTINUED | COMMUNITY
Start: 2019-01-23 | End: 2020-02-07

## 2020-02-07 NOTE — HISTORY OF PRESENT ILLNESS
[FreeTextEntry1] : Here for followup\par No new complaints\par Off meds\par Doing well\par BP slightly high

## 2020-02-07 NOTE — DISCUSSION/SUMMARY
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

## 2020-08-03 ENCOUNTER — TRANSCRIPTION ENCOUNTER (OUTPATIENT)
Age: 30
End: 2020-08-03

## 2020-08-14 ENCOUNTER — NON-APPOINTMENT (OUTPATIENT)
Age: 30
End: 2020-08-14

## 2020-08-14 ENCOUNTER — APPOINTMENT (OUTPATIENT)
Dept: CARDIOLOGY | Facility: CLINIC | Age: 30
End: 2020-08-14
Payer: COMMERCIAL

## 2020-08-14 VITALS
HEIGHT: 66 IN | WEIGHT: 237 LBS | DIASTOLIC BLOOD PRESSURE: 86 MMHG | RESPIRATION RATE: 16 BRPM | BODY MASS INDEX: 38.09 KG/M2 | SYSTOLIC BLOOD PRESSURE: 132 MMHG | TEMPERATURE: 97.9 F | HEART RATE: 90 BPM | OXYGEN SATURATION: 100 %

## 2020-08-14 PROCEDURE — 99213 OFFICE O/P EST LOW 20 MIN: CPT

## 2020-08-14 PROCEDURE — 93000 ELECTROCARDIOGRAM COMPLETE: CPT

## 2020-08-14 NOTE — PHYSICAL EXAM
[General Appearance - Well Developed] : well developed [Normal Appearance] : normal appearance [Well Groomed] : well groomed [General Appearance - Well Nourished] : well nourished [No Deformities] : no deformities [General Appearance - In No Acute Distress] : no acute distress [Normal Conjunctiva] : the conjunctiva exhibited no abnormalities [Eyelids - No Xanthelasma] : the eyelids demonstrated no xanthelasmas [Normal Oral Mucosa] : normal oral mucosa [No Oral Pallor] : no oral pallor [No Oral Cyanosis] : no oral cyanosis [Normal Jugular Venous A Waves Present] : normal jugular venous A waves present [Normal Jugular Venous V Waves Present] : normal jugular venous V waves present [No Jugular Venous Pickering A Waves] : no jugular venous pickering A waves [Respiration, Rhythm And Depth] : normal respiratory rhythm and effort [Exaggerated Use Of Accessory Muscles For Inspiration] : no accessory muscle use [Auscultation Breath Sounds / Voice Sounds] : lungs were clear to auscultation bilaterally [Heart Sounds] : normal S1 and S2 [Heart Rate And Rhythm] : heart rate and rhythm were normal [Murmurs] : no murmurs present [Abdomen Tenderness] : non-tender [Abdomen Soft] : soft [Abdomen Mass (___ Cm)] : no abdominal mass palpated [Nail Clubbing] : no clubbing of the fingernails [Cyanosis, Localized] : no localized cyanosis [Petechial Hemorrhages (___cm)] : no petechial hemorrhages [Skin Color & Pigmentation] : normal skin color and pigmentation [Skin Lesions] : no skin lesions [] : no rash [No Venous Stasis] : no venous stasis [No Xanthoma] : no  xanthoma was observed [No Skin Ulcers] : no skin ulcer [Oriented To Time, Place, And Person] : oriented to person, place, and time [Affect] : the affect was normal [No Anxiety] : not feeling anxious [Mood] : the mood was normal

## 2020-08-14 NOTE — HISTORY OF PRESENT ILLNESS
[FreeTextEntry1] : Here for followup. No new complaints\par No chest pain, dyspnea or edema.\par \par TTE 2/7/2020- 1. Aortic valve not well visualized; probably normal. 2. Normal left ventricular internal dimensions and wall thicknesses. 3. Endocardium not well visualized; grossly normal left ventricular systolic function. 4. 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

## 2020-08-14 NOTE — DISCUSSION/SUMMARY
[FreeTextEntry1] : 29 year old woman with history of PPCM with complete resolution of LV function- off meds here for followup\par BP on repeat was 130/82\par Doing well\par FU in 6 months

## 2020-09-02 ENCOUNTER — TRANSCRIPTION ENCOUNTER (OUTPATIENT)
Age: 30
End: 2020-09-02

## 2020-09-10 ENCOUNTER — APPOINTMENT (OUTPATIENT)
Dept: ORTHOPEDIC SURGERY | Facility: CLINIC | Age: 30
End: 2020-09-10
Payer: COMMERCIAL

## 2020-09-10 VITALS
DIASTOLIC BLOOD PRESSURE: 77 MMHG | HEIGHT: 66 IN | WEIGHT: 248 LBS | HEART RATE: 97 BPM | SYSTOLIC BLOOD PRESSURE: 126 MMHG | BODY MASS INDEX: 39.86 KG/M2 | TEMPERATURE: 97.7 F

## 2020-09-10 DIAGNOSIS — M25.551 PAIN IN RIGHT HIP: ICD-10-CM

## 2020-09-10 DIAGNOSIS — M75.81 OTHER SHOULDER LESIONS, RIGHT SHOULDER: ICD-10-CM

## 2020-09-10 DIAGNOSIS — M54.16 RADICULOPATHY, LUMBAR REGION: ICD-10-CM

## 2020-09-10 DIAGNOSIS — M25.561 PAIN IN RIGHT KNEE: ICD-10-CM

## 2020-09-10 PROCEDURE — 72100 X-RAY EXAM L-S SPINE 2/3 VWS: CPT

## 2020-09-10 PROCEDURE — 73562 X-RAY EXAM OF KNEE 3: CPT | Mod: RT

## 2020-09-10 PROCEDURE — 99203 OFFICE O/P NEW LOW 30 MIN: CPT

## 2020-09-10 RX ORDER — MELOXICAM 7.5 MG/1
7.5 TABLET ORAL TWICE DAILY
Qty: 60 | Refills: 0 | Status: ACTIVE | COMMUNITY
Start: 2020-09-10 | End: 1900-01-01

## 2020-09-14 RX ORDER — MELOXICAM 15 MG/1
15 TABLET ORAL
Qty: 30 | Refills: 0 | Status: ACTIVE | COMMUNITY
Start: 2020-09-14 | End: 1900-01-01

## 2020-10-02 ENCOUNTER — APPOINTMENT (OUTPATIENT)
Dept: ORTHOPEDIC SURGERY | Facility: CLINIC | Age: 30
End: 2020-10-02

## 2020-10-21 ENCOUNTER — TRANSCRIPTION ENCOUNTER (OUTPATIENT)
Age: 30
End: 2020-10-21

## 2021-02-12 ENCOUNTER — APPOINTMENT (OUTPATIENT)
Dept: CARDIOLOGY | Facility: CLINIC | Age: 31
End: 2021-02-12

## 2021-06-25 NOTE — DISCHARGE NOTE OB - MEDICATION SUMMARY - MEDICATIONS TO TAKE
Problem: Self Care Deficits Care Plan (Adult)  Goal: *Acute Goals and Plan of Care (Insert Text)  Description: Occupational Therapy Goals  Initiated 6/24/2021 within 7 day(s). 1.  Patient will perform grooming in stance at the sink with supervision/set-up demonstrating F+ balance and no c/o dizziness. 2.  Patient will perform lower body dressing with modified independence. 3.  Patient will perform bathing with supervision/set-up. 4.  Patient will perform toilet transfers with supervision/set-up. 5.  Patient will perform all aspects of toileting with modified independence. 6.  Patient will participate in upper extremity therapeutic exercise/activities with modified independence for 8 minutes. 7.  Patient will utilize energy conservation techniques during functional activities with verbal cues. Prior Level of Function: Pt presents from ARU and was doing well and at supv for grooming, bathing, UB dressing, and toileting, and [facility] for LB dressing, toilet transfers. Pt lives in [place] with his wife. Pt stated he has 14 interior steps; however chart stated 7. Pt only has grab bars in shower, no other DME or AE at home. Outcome: Progressing Towards Goal   OCCUPATIONAL THERAPY TREATMENT    Patient: Klaus Wright (77 y.o. male)  Date: 6/25/2021  Diagnosis: Mesenteric abscess ([facility] [address] [K65.1] <principal problem not specified>       Precautions:  fall risk  PLOF: see above    Chart, occupational therapy assessment, plan of care, and goals were reviewed. ASSESSMENT:  Nursing/RN cleared for pt to participate in OT tx session. Patient was seen with PT to maximize patient safety, participation, and functional mobility in preparation for self-care tasks. Patient lying semi-reclined in bed, CGA supine to sit edge of bed. Toilet transfer using RW w/ CGA and additional time, vc's for turning completely prior to sitting on toilet and use of grab bar to assist w/ sit <-> stand.  Clothing mgmt: CGA to hike down over hips, Mod A to thread off over feet d/t soiled with urine. Patient noted with decreased functional activity tolerance during functional transfer back to bed w/ RW requiring Min A, Sit to supine w/ Min A, max vc's for purse lip breathing technique throughout functional mobility and ADL tasks, O2 %. Patient resting comfortably lying semi reclined in bed, call bell within reach & pt verbalized understanding and provided return demonstration to utilize for assist e.g. functional transfers in order to prevent falls. Progression toward goals:  []          Improving appropriately and progressing toward goals  [x]          Improving slowly and progressing toward goals  []          Not making progress toward goals and plan of care will be adjusted     PLAN:  Patient continues to benefit from skilled intervention to address the above impairments. Continue treatment per established plan of care. Discharge Recommendations: Inpatient Rehab  Further Equipment Recommendations for Discharge:  bedside commode, transfer bench, rolling walker, and wheelchair      SUBJECTIVE:   Patient stated I'm kind of tired.     OBJECTIVE DATA SUMMARY:   Cognitive/Behavioral Status:  Neurologic State: Alert  Orientation Level: Oriented to person, Oriented to place  Cognition: Follows commands, Poor safety awareness  Safety/Judgement: Fall prevention    Functional Mobility and Transfers for ADLs:   Bed Mobility:     Supine to Sit: Supervision  Sit to Supine: Minimum assistance      Transfers:  Sit to Stand: Minimum assistance  Stand to Sit: Contact guard assistance      Toilet Transfer : Contact guard assistance;Minimum assistance      Balance:  Sitting: Impaired  Sitting - Static: Good (unsupported)  Sitting - Dynamic: Fair (occasional)  Standing: Impaired  Standing - Static: Fair  Standing - Dynamic : Fair    ADL Intervention:     Patient lying semi-reclined in bed, CGA supine to sit edge of bed.  Toilet transfer using RW w/ CGA and additional time, vc's for turning completely prior to sitting on toilet and use of grab bar to assist w/ sit <-> stand. Clothing mgmt: CGA to hike down over hips, Mod A to thread off over feet d/t soiled with urine. Patient noted with decreased functional activity tolerance during functional transfer back to bed w/ RW requiring Min A. Sit to supine w/ Min A, max vc's for purse lip breathing technique throughout functional mobility and ADL tasks, O2 %. Pain:  Pain level pre-treatment: 0/10   Pain level post-treatment: 0/10    Activity Tolerance:    poor    Please refer to the flowsheet for vital signs taken during this treatment. After treatment:   []  Patient left in no apparent distress sitting up in chair  [x]  Patient left in no apparent distress in bed  [x]  Call bell left within reach  [x]  Nursing notified  []  Caregiver present  []  Bed alarm activated    COMMUNICATION/EDUCATION:   [x] Role of Occupational Therapy in the acute care setting  [x] Home safety education was provided and the patient/caregiver indicated understanding. [x] Patient/family have participated as able in working towards goals and plan of care. [x] Patient/family agree to work toward stated goals and plan of care. [] Patient understands intent and goals of therapy, but is neutral about his/her participation. [] Patient is unable to participate in goal setting and plan of care.       Thank you for this referral.  Miguel Crockett  Time Calculation: 23 mins I will START or STAY ON the medications listed below when I get home from the hospital:  None I will START or STAY ON the medications listed below when I get home from the hospital:    ibuprofen 600 mg oral tablet  -- 1 tab(s) by mouth every 6 hours  -- Indication: For  delivery delivered    acetaminophen 500 mg oral tablet  -- 2 tab(s) by mouth every 6 hours  -- Indication: For  delivery delivered I will START or STAY ON the medications listed below when I get home from the hospital:    ibuprofen 600 mg oral tablet  -- 1 tab(s) by mouth every 6 hours  -- Indication: For  delivery delivered    acetaminophen 500 mg oral tablet  -- 2 tab(s) by mouth every 6 hours  -- Indication: For  delivery delivered    labetalol 200 mg oral tablet  -- 1 tab(s) by mouth 3 times a day  -- Indication: For blood pressure    Prenatal Multivitamins with Folic Acid 1 mg oral tablet  -- 1 tab(s) by mouth once a day  -- Indication: For Wellness

## 2021-08-24 ENCOUNTER — APPOINTMENT (OUTPATIENT)
Dept: CARDIOLOGY | Facility: CLINIC | Age: 31
End: 2021-08-24
Payer: COMMERCIAL

## 2021-08-24 ENCOUNTER — NON-APPOINTMENT (OUTPATIENT)
Age: 31
End: 2021-08-24

## 2021-08-24 VITALS
WEIGHT: 248 LBS | HEIGHT: 66 IN | SYSTOLIC BLOOD PRESSURE: 141 MMHG | RESPIRATION RATE: 16 BRPM | TEMPERATURE: 97.4 F | DIASTOLIC BLOOD PRESSURE: 81 MMHG | HEART RATE: 67 BPM | BODY MASS INDEX: 39.86 KG/M2 | OXYGEN SATURATION: 100 %

## 2021-08-24 PROCEDURE — 93000 ELECTROCARDIOGRAM COMPLETE: CPT

## 2021-08-24 PROCEDURE — 99213 OFFICE O/P EST LOW 20 MIN: CPT

## 2021-08-24 NOTE — HISTORY OF PRESENT ILLNESS
[FreeTextEntry1] : Here for followup. No new complaints\par No chest pain, dyspnea or edema.\par \par TTE 2/7/2020- 1. Aortic valve not well visualized; probably normal. 2. Normal left ventricular internal dimensions and wall thicknesses. 3. Endocardium not well visualized; grossly normal left ventricular systolic function. 4. The right ventricle is not well visualized.\par \par

## 2021-08-24 NOTE — DISCUSSION/SUMMARY
[FreeTextEntry1] : 29 year old woman with history of PPCM with complete resolution of LV function- off meds here for followup\par BP on repeat was 124/82\par Check TTE\par Doing well\par FU in 6 months

## 2021-12-17 ENCOUNTER — APPOINTMENT (OUTPATIENT)
Dept: CV DIAGNOSITCS | Facility: HOSPITAL | Age: 31
End: 2021-12-17

## 2021-12-17 ENCOUNTER — OUTPATIENT (OUTPATIENT)
Dept: OUTPATIENT SERVICES | Facility: HOSPITAL | Age: 31
LOS: 1 days | End: 2021-12-17
Payer: COMMERCIAL

## 2021-12-17 DIAGNOSIS — I10 ESSENTIAL (PRIMARY) HYPERTENSION: ICD-10-CM

## 2021-12-17 DIAGNOSIS — O34.219 MATERNAL CARE FOR UNSPECIFIED TYPE SCAR FROM PREVIOUS CESAREAN DELIVERY: Chronic | ICD-10-CM

## 2021-12-17 PROCEDURE — 93306 TTE W/DOPPLER COMPLETE: CPT | Mod: 26

## 2022-07-31 ENCOUNTER — NON-APPOINTMENT (OUTPATIENT)
Age: 32
End: 2022-07-31

## 2022-11-01 ENCOUNTER — APPOINTMENT (OUTPATIENT)
Dept: CARDIOLOGY | Facility: CLINIC | Age: 32
End: 2022-11-01

## 2022-11-01 ENCOUNTER — NON-APPOINTMENT (OUTPATIENT)
Age: 32
End: 2022-11-01

## 2022-11-01 VITALS
OXYGEN SATURATION: 100 % | WEIGHT: 175 LBS | TEMPERATURE: 97.4 F | SYSTOLIC BLOOD PRESSURE: 162 MMHG | BODY MASS INDEX: 28.12 KG/M2 | HEIGHT: 66 IN | DIASTOLIC BLOOD PRESSURE: 104 MMHG | HEART RATE: 80 BPM

## 2022-11-01 DIAGNOSIS — I10 ESSENTIAL (PRIMARY) HYPERTENSION: ICD-10-CM

## 2022-11-01 PROCEDURE — 99214 OFFICE O/P EST MOD 30 MIN: CPT

## 2022-11-01 PROCEDURE — 93000 ELECTROCARDIOGRAM COMPLETE: CPT

## 2022-11-01 NOTE — DISCUSSION/SUMMARY
[FreeTextEntry1] : 32 year old woman with history of PPCM with complete resolution of LV function- off meds. Presents with complaints of elevated BP associated with HA x 5 days now. Was not checking BP in the past. \par No chest pain, dyspnea or edema.\par TTE 2/7/2020- 1. Aortic valve not well visualized; probably normal. 2. Normal left ventricular internal dimensions and wall thicknesses. 3. Endocardium not well visualized; grossly normal left ventricular systolic function. 4. The right ventricle is not well visualized.\par BP logs at home- 150-160s/  \par \par HTN: BP elevated \par Enalapril 5 mg QD ( was on this med in the past) \par Encouraged Patient to monitor BP at home and keep a log and report results back to us for evaluation. Based on results, we will adjust medications as necessary. \par Additionally, encouraged heart healthy diet and exercise as tolerated.\par EKG with no acute changes.\par  [EKG obtained to assist in diagnosis and management of assessed problem(s)] : EKG obtained to assist in diagnosis and management of assessed problem(s)

## 2022-11-01 NOTE — HISTORY OF PRESENT ILLNESS
[FreeTextEntry1] : 32 year old woman with history of PPCM with complete resolution of LV function- off meds. Presents with complaints of elevated BP associated with HA x 5 days now. Was not checking BP in the past. \par No chest pain, dyspnea or edema.\par TTE 2/7/2020- 1. Aortic valve not well visualized; probably normal. 2. Normal left ventricular internal dimensions and wall thicknesses. 3. Endocardium not well visualized; grossly normal left ventricular systolic function. 4. The right ventricle is not well visualized. She was evaluated by neurologist today and is advised to undergo MRI . \par BP logs at home- 150-160s/  \par \par HTN: BP elevated \par Enalapril 5 mg QD ( was on this med in the past) \par Encouraged Patient to monitor BP at home and keep a log and report results back to us for evaluation. Based on results, we will adjust medications as necessary. \par Additionally, encouraged heart healthy diet and exercise as tolerated.\par EKG with no acute changes.\par \par

## 2022-12-05 ENCOUNTER — APPOINTMENT (OUTPATIENT)
Dept: CARDIOLOGY | Facility: CLINIC | Age: 32
End: 2022-12-05

## 2022-12-05 VITALS
HEIGHT: 66 IN | WEIGHT: 175 LBS | HEART RATE: 77 BPM | DIASTOLIC BLOOD PRESSURE: 75 MMHG | OXYGEN SATURATION: 100 % | BODY MASS INDEX: 28.12 KG/M2 | SYSTOLIC BLOOD PRESSURE: 120 MMHG

## 2022-12-05 PROCEDURE — 93000 ELECTROCARDIOGRAM COMPLETE: CPT

## 2022-12-05 PROCEDURE — 99214 OFFICE O/P EST MOD 30 MIN: CPT

## 2022-12-07 NOTE — HISTORY OF PRESENT ILLNESS
[FreeTextEntry1] : 32 year old woman with history of PPCM with complete resolution of LV function\par SHe is here for followup of BP- we had restarted enalapril at 10 mg daily\par \par TTE 2/7/2020- 1. Aortic valve not well visualized; probably normal. 2. Normal left ventricular internal dimensions and wall thicknesses. 3. Endocardium not well visualized; grossly normal left ventricular systolic function. 4. The right ventricle is not well visualized.\par \par HTN: BP better on 10 mg daily of enalapril\par EKG reviewed\par \par \par \par

## 2022-12-07 NOTE — DISCUSSION/SUMMARY
[EKG obtained to assist in diagnosis and management of assessed problem(s)] : EKG obtained to assist in diagnosis and management of assessed problem(s) [FreeTextEntry1] : 32 year old woman with history of PPCM with complete resolution; now with hypertension\par \par HTN: BP elevated \par Enalapril 10 mg daily\par \par

## 2023-05-25 ENCOUNTER — OUTPATIENT (OUTPATIENT)
Dept: OUTPATIENT SERVICES | Facility: HOSPITAL | Age: 33
LOS: 1 days | End: 2023-05-25
Payer: COMMERCIAL

## 2023-05-25 ENCOUNTER — APPOINTMENT (OUTPATIENT)
Dept: CV DIAGNOSITCS | Facility: HOSPITAL | Age: 33
End: 2023-05-25

## 2023-05-25 DIAGNOSIS — O34.219 MATERNAL CARE FOR UNSPECIFIED TYPE SCAR FROM PREVIOUS CESAREAN DELIVERY: Chronic | ICD-10-CM

## 2023-05-25 DIAGNOSIS — I50.22 CHRONIC SYSTOLIC (CONGESTIVE) HEART FAILURE: ICD-10-CM

## 2023-05-25 DIAGNOSIS — Z00.00 ENCOUNTER FOR GENERAL ADULT MEDICAL EXAMINATION WITHOUT ABNORMAL FINDINGS: ICD-10-CM

## 2023-05-25 PROCEDURE — 93306 TTE W/DOPPLER COMPLETE: CPT | Mod: 26

## 2023-05-30 ENCOUNTER — APPOINTMENT (OUTPATIENT)
Dept: CARDIOLOGY | Facility: CLINIC | Age: 33
End: 2023-05-30
Payer: COMMERCIAL

## 2023-05-30 ENCOUNTER — NON-APPOINTMENT (OUTPATIENT)
Age: 33
End: 2023-05-30

## 2023-05-30 VITALS
SYSTOLIC BLOOD PRESSURE: 141 MMHG | TEMPERATURE: 97.4 F | BODY MASS INDEX: 28.12 KG/M2 | WEIGHT: 175 LBS | DIASTOLIC BLOOD PRESSURE: 85 MMHG | HEART RATE: 82 BPM | HEIGHT: 66 IN | OXYGEN SATURATION: 100 %

## 2023-05-30 DIAGNOSIS — I50.22 CHRONIC SYSTOLIC (CONGESTIVE) HEART FAILURE: ICD-10-CM

## 2023-05-30 DIAGNOSIS — I10 ESSENTIAL (PRIMARY) HYPERTENSION: ICD-10-CM

## 2023-05-30 PROCEDURE — 93000 ELECTROCARDIOGRAM COMPLETE: CPT

## 2023-05-30 PROCEDURE — 99214 OFFICE O/P EST MOD 30 MIN: CPT

## 2023-05-30 NOTE — HISTORY OF PRESENT ILLNESS
[FreeTextEntry1] : 32 year old woman with history of PPCM with complete resolution of LV function was seen last month in setting of headaches and hypertension.\par Increaed Enalapril recently to 20 mg daily due to elevated readings again with HA\par TTE normal 5/25/23- EF 62%\par \par #PPCM- Resolved\par  TTE normal 5/25/23- EF 62%\par \par #HTN- Enalapril 20 mg daily, continue\par

## 2023-05-30 NOTE — REASON FOR VISIT
[Follow-Up - Clinic] : a clinic follow-up of [Cardiac Failure] : cardiac failure [Hypertension] : hypertension [FreeTextEntry2] : History of PPCM and Preeclampsia

## 2023-05-30 NOTE — DISCUSSION/SUMMARY
[FreeTextEntry1] : 32 year old woman with history of PPCM with complete resolution of LV function was seen last month in setting of headaches and hypertension.Restarted on Enalapril 5 mg daily and BP now normal with resolution of symptoms\par \par #PPCM- Resolved\par  TTE normal 5/25/23- EF 62%\par \par #HTN- Enalapril 20 mg daily, continue\par \par \par  [EKG obtained to assist in diagnosis and management of assessed problem(s)] : EKG obtained to assist in diagnosis and management of assessed problem(s)

## 2023-06-05 NOTE — DISCHARGE NOTE ADULT - PATIENT PORTAL LINK FT
Wash skin with antibacterial soap and apply ointment .  Give oral antibiotics as directed.  Call if worse or not better within 3 days.   “You can access the [facility] Patient Portal, offered by [facility], by registering with the following website: [email or web address]”

## 2023-08-15 ENCOUNTER — RX RENEWAL (OUTPATIENT)
Age: 33
End: 2023-08-15

## 2023-08-18 ENCOUNTER — APPOINTMENT (OUTPATIENT)
Dept: CARDIOLOGY | Facility: CLINIC | Age: 33
End: 2023-08-18

## 2023-08-23 RX ORDER — ENALAPRIL MALEATE 10 MG/1
10 TABLET ORAL
Qty: 90 | Refills: 2 | Status: DISCONTINUED | COMMUNITY
Start: 2023-08-15 | End: 2023-08-23

## 2023-08-23 RX ORDER — ENALAPRIL MALEATE 20 MG/1
20 TABLET ORAL
Qty: 90 | Refills: 3 | Status: ACTIVE | COMMUNITY
Start: 2017-09-24 | End: 1900-01-01

## 2023-10-27 ENCOUNTER — APPOINTMENT (OUTPATIENT)
Dept: CARDIOLOGY | Facility: CLINIC | Age: 33
End: 2023-10-27
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
## 2024-09-05 ENCOUNTER — APPOINTMENT (OUTPATIENT)
Dept: CARDIOLOGY | Facility: CLINIC | Age: 34
End: 2024-09-05

## 2025-02-14 NOTE — ED ADULT NURSE NOTE - CHIEF COMPLAINT QUOTE
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  Pt hit head on refrigerator door and was told to come to ED if she hit her head.  Pt reports minor headache, no LOC.
no